# Patient Record
Sex: FEMALE | Race: WHITE | Employment: OTHER | URBAN - NONMETROPOLITAN AREA
[De-identification: names, ages, dates, MRNs, and addresses within clinical notes are randomized per-mention and may not be internally consistent; named-entity substitution may affect disease eponyms.]

---

## 2020-12-10 PROBLEM — I11.0 HYPERTENSIVE HEART DISEASE WITH CHRONIC SYSTOLIC CONGESTIVE HEART FAILURE (HCC): Status: ACTIVE | Noted: 2020-01-01

## 2020-12-10 PROBLEM — I50.22 HYPERTENSIVE HEART DISEASE WITH CHRONIC SYSTOLIC CONGESTIVE HEART FAILURE (HCC): Status: ACTIVE | Noted: 2020-01-01

## 2021-01-01 ENCOUNTER — APPOINTMENT (OUTPATIENT)
Dept: GENERAL RADIOLOGY | Age: 81
DRG: 870 | End: 2021-01-01
Payer: MEDICARE

## 2021-01-01 ENCOUNTER — APPOINTMENT (OUTPATIENT)
Dept: CT IMAGING | Age: 81
DRG: 870 | End: 2021-01-01
Payer: MEDICARE

## 2021-01-01 ENCOUNTER — HOSPITAL ENCOUNTER (INPATIENT)
Age: 81
LOS: 23 days | DRG: 870 | End: 2021-11-13
Attending: EMERGENCY MEDICINE
Payer: MEDICARE

## 2021-01-01 ENCOUNTER — APPOINTMENT (OUTPATIENT)
Dept: ULTRASOUND IMAGING | Age: 81
DRG: 870 | End: 2021-01-01
Payer: MEDICARE

## 2021-01-01 VITALS
RESPIRATION RATE: 25 BRPM | WEIGHT: 170.6 LBS | HEIGHT: 62 IN | DIASTOLIC BLOOD PRESSURE: 48 MMHG | BODY MASS INDEX: 31.39 KG/M2 | OXYGEN SATURATION: 90 % | HEART RATE: 74 BPM | SYSTOLIC BLOOD PRESSURE: 103 MMHG | TEMPERATURE: 99.3 F

## 2021-01-01 DIAGNOSIS — U07.1 COVID-19: Primary | ICD-10-CM

## 2021-01-01 DIAGNOSIS — E87.1 HYPONATREMIA: ICD-10-CM

## 2021-01-01 DIAGNOSIS — R53.1 GENERALIZED WEAKNESS: ICD-10-CM

## 2021-01-01 DIAGNOSIS — J96.01 ACUTE RESPIRATORY FAILURE WITH HYPOXIA (HCC): ICD-10-CM

## 2021-01-01 LAB
ABO: NORMAL
ACINETOBACTER CALCOACETICUS-BAUMANNII BY PCR: NOT DETECTED
ACINETOBACTER CALCOACETICUS-BAUMANNII BY PCR: NOT DETECTED
ACT TEG W HEP: 121 SECONDS (ref 86–118)
ACT TEG: 113 SECONDS (ref 86–118)
ADENOVIRUS BY PCR: NOT DETECTED
ADENOVIRUS BY PCR: NOT DETECTED
ALBUMIN SERPL-MCNC: 1.9 G/DL (ref 3.5–5.1)
ALBUMIN SERPL-MCNC: 2.4 G/DL (ref 3.5–5.1)
ALBUMIN SERPL-MCNC: 2.6 G/DL (ref 3.5–5.1)
ALBUMIN SERPL-MCNC: 3 G/DL (ref 3.5–5.1)
ALBUMIN SERPL-MCNC: 3.1 G/DL (ref 3.5–5.1)
ALBUMIN SERPL-MCNC: 3.5 G/DL (ref 3.5–5.1)
ALLEN TEST: ABNORMAL
ALLEN TEST: ABNORMAL
ALLEN TEST: POSITIVE
ALP BLD-CCNC: 116 U/L (ref 38–126)
ALP BLD-CCNC: 66 U/L (ref 38–126)
ALP BLD-CCNC: 71 U/L (ref 38–126)
ALP BLD-CCNC: 82 U/L (ref 38–126)
ALP BLD-CCNC: 93 U/L (ref 38–126)
ALT SERPL-CCNC: 12 U/L (ref 11–66)
ALT SERPL-CCNC: 22 U/L (ref 11–66)
ALT SERPL-CCNC: 31 U/L (ref 11–66)
ALT SERPL-CCNC: 66 U/L (ref 11–66)
ALT SERPL-CCNC: 9 U/L (ref 11–66)
ANGLE, RAPID TEG W HEP: 75.9 DEG (ref 64–80)
ANGLE, RAPID TEG: 70 DEG (ref 64–80)
ANION GAP SERPL CALCULATED.3IONS-SCNC: 10 MEQ/L (ref 8–16)
ANION GAP SERPL CALCULATED.3IONS-SCNC: 10 MEQ/L (ref 8–16)
ANION GAP SERPL CALCULATED.3IONS-SCNC: 11 MEQ/L (ref 8–16)
ANION GAP SERPL CALCULATED.3IONS-SCNC: 12 MEQ/L (ref 8–16)
ANION GAP SERPL CALCULATED.3IONS-SCNC: 13 MEQ/L (ref 8–16)
ANION GAP SERPL CALCULATED.3IONS-SCNC: 14 MEQ/L (ref 8–16)
ANION GAP SERPL CALCULATED.3IONS-SCNC: 15 MEQ/L (ref 8–16)
ANION GAP SERPL CALCULATED.3IONS-SCNC: 16 MEQ/L (ref 8–16)
ANION GAP SERPL CALCULATED.3IONS-SCNC: 17 MEQ/L (ref 8–16)
ANION GAP SERPL CALCULATED.3IONS-SCNC: 18 MEQ/L (ref 8–16)
ANION GAP SERPL CALCULATED.3IONS-SCNC: 18 MEQ/L (ref 8–16)
ANION GAP SERPL CALCULATED.3IONS-SCNC: 20 MEQ/L (ref 8–16)
ANION GAP SERPL CALCULATED.3IONS-SCNC: 9 MEQ/L (ref 8–16)
ANION GAP SERPL CALCULATED.3IONS-SCNC: 9 MEQ/L (ref 8–16)
ANISOCYTOSIS: PRESENT
ANTIBODY SCREEN: NORMAL
APTT: 33.9 SECONDS (ref 22–38)
AST SERPL-CCNC: 23 U/L (ref 5–40)
AST SERPL-CCNC: 36 U/L (ref 5–40)
AST SERPL-CCNC: 37 U/L (ref 5–40)
AST SERPL-CCNC: 40 U/L (ref 5–40)
AST SERPL-CCNC: 40 U/L (ref 5–40)
ATYPICAL LYMPHOCYTES: ABNORMAL %
BACTERIA: ABNORMAL /HPF
BACTERIA: ABNORMAL /HPF
BASE EXCESS (CALCULATED): -0.5 MMOL/L (ref -2.5–2.5)
BASE EXCESS (CALCULATED): -1.5 MMOL/L (ref -2.5–2.5)
BASE EXCESS (CALCULATED): -2.2 MMOL/L (ref -2.5–2.5)
BASE EXCESS (CALCULATED): -2.8 MMOL/L (ref -2.5–2.5)
BASE EXCESS (CALCULATED): -3.5 MMOL/L (ref -2.5–2.5)
BASE EXCESS (CALCULATED): -4.1 MMOL/L (ref -2.5–2.5)
BASE EXCESS (CALCULATED): -4.9 MMOL/L (ref -2.5–2.5)
BASE EXCESS (CALCULATED): -5.2 MMOL/L (ref -2.5–2.5)
BASE EXCESS (CALCULATED): 1.2 MMOL/L (ref -2.5–2.5)
BASE EXCESS (CALCULATED): 1.6 MMOL/L (ref -2.5–2.5)
BASE EXCESS (CALCULATED): 3.8 MMOL/L (ref -2.5–2.5)
BASOPHILIA: ABNORMAL
BASOPHILIA: ABNORMAL
BASOPHILIC STIPPLING: ABNORMAL
BASOPHILS # BLD: 0 %
BASOPHILS # BLD: 0.1 %
BASOPHILS # BLD: 0.2 %
BASOPHILS # BLD: 0.3 %
BASOPHILS # BLD: 0.4 %
BASOPHILS # BLD: 0.5 %
BASOPHILS # BLD: 0.6 %
BASOPHILS # BLD: 0.6 %
BASOPHILS ABSOLUTE: 0 THOU/MM3 (ref 0–0.1)
BASOPHILS ABSOLUTE: 0.1 THOU/MM3 (ref 0–0.1)
BASOPHILS ABSOLUTE: 0.2 THOU/MM3 (ref 0–0.1)
BILIRUB SERPL-MCNC: 0.2 MG/DL (ref 0.3–1.2)
BILIRUB SERPL-MCNC: 0.3 MG/DL (ref 0.3–1.2)
BILIRUB SERPL-MCNC: 0.3 MG/DL (ref 0.3–1.2)
BILIRUB SERPL-MCNC: 0.5 MG/DL (ref 0.3–1.2)
BILIRUB SERPL-MCNC: 0.9 MG/DL (ref 0.3–1.2)
BILIRUBIN DIRECT: 0.7 MG/DL (ref 0–0.3)
BILIRUBIN DIRECT: < 0.2 MG/DL (ref 0–0.3)
BILIRUBIN DIRECT: < 0.2 MG/DL (ref 0–0.3)
BILIRUBIN URINE: NEGATIVE
BILIRUBIN URINE: NEGATIVE
BLOOD CULTURE, ROUTINE: NORMAL
BLOOD, URINE: ABNORMAL
BLOOD, URINE: NEGATIVE
BUN BLDV-MCNC: 102 MG/DL (ref 7–22)
BUN BLDV-MCNC: 105 MG/DL (ref 7–22)
BUN BLDV-MCNC: 105 MG/DL (ref 7–22)
BUN BLDV-MCNC: 106 MG/DL (ref 7–22)
BUN BLDV-MCNC: 123 MG/DL (ref 7–22)
BUN BLDV-MCNC: 18 MG/DL (ref 7–22)
BUN BLDV-MCNC: 20 MG/DL (ref 7–22)
BUN BLDV-MCNC: 28 MG/DL (ref 7–22)
BUN BLDV-MCNC: 32 MG/DL (ref 7–22)
BUN BLDV-MCNC: 34 MG/DL (ref 7–22)
BUN BLDV-MCNC: 38 MG/DL (ref 7–22)
BUN BLDV-MCNC: 42 MG/DL (ref 7–22)
BUN BLDV-MCNC: 44 MG/DL (ref 7–22)
BUN BLDV-MCNC: 48 MG/DL (ref 7–22)
BUN BLDV-MCNC: 64 MG/DL (ref 7–22)
BUN BLDV-MCNC: 64 MG/DL (ref 7–22)
BUN BLDV-MCNC: 65 MG/DL (ref 7–22)
BUN BLDV-MCNC: 66 MG/DL (ref 7–22)
BUN BLDV-MCNC: 68 MG/DL (ref 7–22)
BUN BLDV-MCNC: 69 MG/DL (ref 7–22)
BUN BLDV-MCNC: 72 MG/DL (ref 7–22)
BUN BLDV-MCNC: 90 MG/DL (ref 7–22)
BUN BLDV-MCNC: 92 MG/DL (ref 7–22)
BUN BLDV-MCNC: 99 MG/DL (ref 7–22)
C-REACTIVE PROTEIN: 1.61 MG/DL (ref 0–1)
C-REACTIVE PROTEIN: 1.76 MG/DL (ref 0–1)
C-REACTIVE PROTEIN: 1.97 MG/DL (ref 0–1)
C-REACTIVE PROTEIN: 2.88 MG/DL (ref 0–1)
C-REACTIVE PROTEIN: 8.39 MG/DL (ref 0–1)
CALCIUM IONIZED: 1.33 MMOL/L (ref 1.12–1.32)
CALCIUM SERPL-MCNC: 10 MG/DL (ref 8.5–10.5)
CALCIUM SERPL-MCNC: 8.5 MG/DL (ref 8.5–10.5)
CALCIUM SERPL-MCNC: 8.6 MG/DL (ref 8.5–10.5)
CALCIUM SERPL-MCNC: 8.7 MG/DL (ref 8.5–10.5)
CALCIUM SERPL-MCNC: 8.7 MG/DL (ref 8.5–10.5)
CALCIUM SERPL-MCNC: 8.8 MG/DL (ref 8.5–10.5)
CALCIUM SERPL-MCNC: 8.9 MG/DL (ref 8.5–10.5)
CALCIUM SERPL-MCNC: 8.9 MG/DL (ref 8.5–10.5)
CALCIUM SERPL-MCNC: 9 MG/DL (ref 8.5–10.5)
CALCIUM SERPL-MCNC: 9.1 MG/DL (ref 8.5–10.5)
CALCIUM SERPL-MCNC: 9.2 MG/DL (ref 8.5–10.5)
CALCIUM SERPL-MCNC: 9.4 MG/DL (ref 8.5–10.5)
CALCIUM SERPL-MCNC: 9.4 MG/DL (ref 8.5–10.5)
CALCIUM SERPL-MCNC: 9.5 MG/DL (ref 8.5–10.5)
CALCIUM SERPL-MCNC: 9.5 MG/DL (ref 8.5–10.5)
CALCIUM SERPL-MCNC: 9.6 MG/DL (ref 8.5–10.5)
CALCIUM SERPL-MCNC: 9.6 MG/DL (ref 8.5–10.5)
CALCIUM SERPL-MCNC: 9.7 MG/DL (ref 8.5–10.5)
CASTS 2: ABNORMAL /LPF
CASTS 2: ABNORMAL /LPF
CASTS UA: ABNORMAL /LPF
CASTS UA: ABNORMAL /LPF
CHARACTER, URINE: ABNORMAL
CHARACTER, URINE: CLEAR
CHLAMYDIA PNEUMONIAE BY PCR: NOT DETECTED
CHLAMYDIA PNEUMONIAE BY PCR: NOT DETECTED
CHLORIDE BLD-SCNC: 100 MEQ/L (ref 98–111)
CHLORIDE BLD-SCNC: 103 MEQ/L (ref 98–111)
CHLORIDE BLD-SCNC: 104 MEQ/L (ref 98–111)
CHLORIDE BLD-SCNC: 104 MEQ/L (ref 98–111)
CHLORIDE BLD-SCNC: 105 MEQ/L (ref 98–111)
CHLORIDE BLD-SCNC: 105 MEQ/L (ref 98–111)
CHLORIDE BLD-SCNC: 106 MEQ/L (ref 98–111)
CHLORIDE BLD-SCNC: 108 MEQ/L (ref 98–111)
CHLORIDE BLD-SCNC: 110 MEQ/L (ref 98–111)
CHLORIDE BLD-SCNC: 111 MEQ/L (ref 98–111)
CHLORIDE BLD-SCNC: 112 MEQ/L (ref 98–111)
CHLORIDE BLD-SCNC: 112 MEQ/L (ref 98–111)
CHLORIDE BLD-SCNC: 114 MEQ/L (ref 98–111)
CHLORIDE BLD-SCNC: 114 MEQ/L (ref 98–111)
CHLORIDE BLD-SCNC: 116 MEQ/L (ref 98–111)
CHLORIDE BLD-SCNC: 92 MEQ/L (ref 98–111)
CHLORIDE BLD-SCNC: 97 MEQ/L (ref 98–111)
CHLORIDE BLD-SCNC: 97 MEQ/L (ref 98–111)
CHLORIDE BLD-SCNC: 98 MEQ/L (ref 98–111)
CHLORIDE BLD-SCNC: 98 MEQ/L (ref 98–111)
CHLORIDE BLD-SCNC: 99 MEQ/L (ref 98–111)
CO2: 16 MEQ/L (ref 23–33)
CO2: 17 MEQ/L (ref 23–33)
CO2: 20 MEQ/L (ref 23–33)
CO2: 21 MEQ/L (ref 23–33)
CO2: 22 MEQ/L (ref 23–33)
CO2: 22 MEQ/L (ref 23–33)
CO2: 23 MEQ/L (ref 23–33)
CO2: 24 MEQ/L (ref 23–33)
CO2: 25 MEQ/L (ref 23–33)
CO2: 26 MEQ/L (ref 23–33)
COLLECTED BY:: ABNORMAL
COLOR: YELLOW
COLOR: YELLOW
CORTISOL COLLECTION INFO: NORMAL
CORTISOL COLLECTION INFO: NORMAL
CORTISOL: 14.34 UG/DL
CORTISOL: 32.59 UG/DL
CREAT SERPL-MCNC: 0.8 MG/DL (ref 0.4–1.2)
CREAT SERPL-MCNC: 0.9 MG/DL (ref 0.4–1.2)
CREAT SERPL-MCNC: 1 MG/DL (ref 0.4–1.2)
CREAT SERPL-MCNC: 1.4 MG/DL (ref 0.4–1.2)
CREAT SERPL-MCNC: 1.5 MG/DL (ref 0.4–1.2)
CREAT SERPL-MCNC: 1.5 MG/DL (ref 0.4–1.2)
CREAT SERPL-MCNC: 1.6 MG/DL (ref 0.4–1.2)
CREAT SERPL-MCNC: 1.6 MG/DL (ref 0.4–1.2)
CREAT SERPL-MCNC: 1.9 MG/DL (ref 0.4–1.2)
CREAT SERPL-MCNC: 2 MG/DL (ref 0.4–1.2)
CREAT SERPL-MCNC: 2.1 MG/DL (ref 0.4–1.2)
CREAT SERPL-MCNC: 2.1 MG/DL (ref 0.4–1.2)
CREAT SERPL-MCNC: 2.2 MG/DL (ref 0.4–1.2)
CREAT SERPL-MCNC: 2.2 MG/DL (ref 0.4–1.2)
CREAT SERPL-MCNC: 2.7 MG/DL (ref 0.4–1.2)
CREAT SERPL-MCNC: 2.7 MG/DL (ref 0.4–1.2)
CREAT SERPL-MCNC: 2.8 MG/DL (ref 0.4–1.2)
CREAT SERPL-MCNC: 2.9 MG/DL (ref 0.4–1.2)
CREATININE URINE: 39 MG/DL
CREATININE URINE: 86.8 MG/DL
CRYSTALS, UA: ABNORMAL
CRYSTALS, UA: ABNORMAL
D-DIMER QUANTITATIVE: 1017 NG/ML FEU (ref 0–500)
D-DIMER QUANTITATIVE: 1360 NG/ML FEU (ref 0–500)
D-DIMER QUANTITATIVE: 830 NG/ML FEU (ref 0–500)
D-DIMER QUANTITATIVE: 947 NG/ML FEU (ref 0–500)
DEVICE: ABNORMAL
EKG ATRIAL RATE: 109 BPM
EKG ATRIAL RATE: 114 BPM
EKG ATRIAL RATE: 81 BPM
EKG ATRIAL RATE: 91 BPM
EKG P AXIS: 12 DEGREES
EKG P AXIS: 19 DEGREES
EKG P-R INTERVAL: 128 MS
EKG P-R INTERVAL: 136 MS
EKG P-R INTERVAL: 152 MS
EKG P-R INTERVAL: 96 MS
EKG Q-T INTERVAL: 320 MS
EKG Q-T INTERVAL: 330 MS
EKG Q-T INTERVAL: 368 MS
EKG Q-T INTERVAL: 380 MS
EKG QRS DURATION: 60 MS
EKG QRS DURATION: 62 MS
EKG QRS DURATION: 62 MS
EKG QRS DURATION: 74 MS
EKG QTC CALCULATION (BAZETT): 430 MS
EKG QTC CALCULATION (BAZETT): 441 MS
EKG QTC CALCULATION (BAZETT): 452 MS
EKG QTC CALCULATION (BAZETT): 454 MS
EKG R AXIS: -13 DEGREES
EKG R AXIS: -17 DEGREES
EKG R AXIS: -19 DEGREES
EKG R AXIS: -22 DEGREES
EKG T AXIS: 11 DEGREES
EKG T AXIS: 30 DEGREES
EKG T AXIS: 40 DEGREES
EKG T AXIS: 99 DEGREES
EKG VENTRICULAR RATE: 109 BPM
EKG VENTRICULAR RATE: 114 BPM
EKG VENTRICULAR RATE: 81 BPM
EKG VENTRICULAR RATE: 91 BPM
ENTEROBACTER CLOACAE COMPLEX BY PCR: NOT DETECTED
ENTEROBACTER CLOACAE COMPLEX BY PCR: NOT DETECTED
EOSINOPHIL # BLD: 0 %
EOSINOPHIL # BLD: 0.1 %
EOSINOPHIL # BLD: 0.2 %
EOSINOPHIL # BLD: 0.5 %
EOSINOPHIL # BLD: 0.8 %
EOSINOPHIL # BLD: 1.4 %
EOSINOPHIL # BLD: 1.9 %
EOSINOPHIL # BLD: 2.6 %
EOSINOPHILS ABSOLUTE: 0 THOU/MM3 (ref 0–0.4)
EOSINOPHILS ABSOLUTE: 0.1 THOU/MM3 (ref 0–0.4)
EOSINOPHILS ABSOLUTE: 0.1 THOU/MM3 (ref 0–0.4)
EOSINOPHILS ABSOLUTE: 0.2 THOU/MM3 (ref 0–0.4)
EPITHELIAL CELLS, UA: ABNORMAL /HPF
EPITHELIAL CELLS, UA: ABNORMAL /HPF
EPL TEG, W/HEP: 0 % (ref 0–15)
EPL-TEG: 0 % (ref 0–15)
ERYTHROCYTE [DISTWIDTH] IN BLOOD BY AUTOMATED COUNT: 11.7 % (ref 11.5–14.5)
ERYTHROCYTE [DISTWIDTH] IN BLOOD BY AUTOMATED COUNT: 11.7 % (ref 11.5–14.5)
ERYTHROCYTE [DISTWIDTH] IN BLOOD BY AUTOMATED COUNT: 11.9 % (ref 11.5–14.5)
ERYTHROCYTE [DISTWIDTH] IN BLOOD BY AUTOMATED COUNT: 11.9 % (ref 11.5–14.5)
ERYTHROCYTE [DISTWIDTH] IN BLOOD BY AUTOMATED COUNT: 12 % (ref 11.5–14.5)
ERYTHROCYTE [DISTWIDTH] IN BLOOD BY AUTOMATED COUNT: 12 % (ref 11.5–14.5)
ERYTHROCYTE [DISTWIDTH] IN BLOOD BY AUTOMATED COUNT: 12.1 % (ref 11.5–14.5)
ERYTHROCYTE [DISTWIDTH] IN BLOOD BY AUTOMATED COUNT: 12.4 % (ref 11.5–14.5)
ERYTHROCYTE [DISTWIDTH] IN BLOOD BY AUTOMATED COUNT: 12.5 % (ref 11.5–14.5)
ERYTHROCYTE [DISTWIDTH] IN BLOOD BY AUTOMATED COUNT: 12.5 % (ref 11.5–14.5)
ERYTHROCYTE [DISTWIDTH] IN BLOOD BY AUTOMATED COUNT: 12.6 % (ref 11.5–14.5)
ERYTHROCYTE [DISTWIDTH] IN BLOOD BY AUTOMATED COUNT: 13.1 % (ref 11.5–14.5)
ERYTHROCYTE [DISTWIDTH] IN BLOOD BY AUTOMATED COUNT: 13.9 % (ref 11.5–14.5)
ERYTHROCYTE [DISTWIDTH] IN BLOOD BY AUTOMATED COUNT: 18.5 % (ref 11.5–14.5)
ERYTHROCYTE [DISTWIDTH] IN BLOOD BY AUTOMATED COUNT: 18.9 % (ref 11.5–14.5)
ERYTHROCYTE [DISTWIDTH] IN BLOOD BY AUTOMATED COUNT: 18.9 % (ref 11.5–14.5)
ERYTHROCYTE [DISTWIDTH] IN BLOOD BY AUTOMATED COUNT: 19.3 % (ref 11.5–14.5)
ERYTHROCYTE [DISTWIDTH] IN BLOOD BY AUTOMATED COUNT: 19.5 % (ref 11.5–14.5)
ERYTHROCYTE [DISTWIDTH] IN BLOOD BY AUTOMATED COUNT: 42.2 FL (ref 35–45)
ERYTHROCYTE [DISTWIDTH] IN BLOOD BY AUTOMATED COUNT: 43.3 FL (ref 35–45)
ERYTHROCYTE [DISTWIDTH] IN BLOOD BY AUTOMATED COUNT: 43.4 FL (ref 35–45)
ERYTHROCYTE [DISTWIDTH] IN BLOOD BY AUTOMATED COUNT: 44.4 FL (ref 35–45)
ERYTHROCYTE [DISTWIDTH] IN BLOOD BY AUTOMATED COUNT: 44.5 FL (ref 35–45)
ERYTHROCYTE [DISTWIDTH] IN BLOOD BY AUTOMATED COUNT: 44.8 FL (ref 35–45)
ERYTHROCYTE [DISTWIDTH] IN BLOOD BY AUTOMATED COUNT: 45.1 FL (ref 35–45)
ERYTHROCYTE [DISTWIDTH] IN BLOOD BY AUTOMATED COUNT: 45.3 FL (ref 35–45)
ERYTHROCYTE [DISTWIDTH] IN BLOOD BY AUTOMATED COUNT: 45.6 FL (ref 35–45)
ERYTHROCYTE [DISTWIDTH] IN BLOOD BY AUTOMATED COUNT: 46.7 FL (ref 35–45)
ERYTHROCYTE [DISTWIDTH] IN BLOOD BY AUTOMATED COUNT: 47.2 FL (ref 35–45)
ERYTHROCYTE [DISTWIDTH] IN BLOOD BY AUTOMATED COUNT: 47.5 FL (ref 35–45)
ERYTHROCYTE [DISTWIDTH] IN BLOOD BY AUTOMATED COUNT: 50.4 FL (ref 35–45)
ERYTHROCYTE [DISTWIDTH] IN BLOOD BY AUTOMATED COUNT: 52.1 FL (ref 35–45)
ERYTHROCYTE [DISTWIDTH] IN BLOOD BY AUTOMATED COUNT: 52.3 FL (ref 35–45)
ERYTHROCYTE [DISTWIDTH] IN BLOOD BY AUTOMATED COUNT: 61.9 FL (ref 35–45)
ERYTHROCYTE [DISTWIDTH] IN BLOOD BY AUTOMATED COUNT: 63.9 FL (ref 35–45)
ERYTHROCYTE [DISTWIDTH] IN BLOOD BY AUTOMATED COUNT: 64.3 FL (ref 35–45)
ERYTHROCYTE [DISTWIDTH] IN BLOOD BY AUTOMATED COUNT: 64.9 FL (ref 35–45)
ERYTHROCYTE [DISTWIDTH] IN BLOOD BY AUTOMATED COUNT: 65.8 FL (ref 35–45)
ESCHERICHIA COLI BY PCR: NOT DETECTED
ESCHERICHIA COLI BY PCR: NOT DETECTED
FERRITIN: 1119 NG/ML (ref 10–291)
FERRITIN: 1271 NG/ML (ref 10–291)
FERRITIN: 1539 NG/ML (ref 10–291)
FERRITIN: 1578 NG/ML (ref 10–291)
FERRITIN: 1649 NG/ML (ref 10–291)
FERRITIN: 4267 NG/ML (ref 10–291)
FERRITIN: 981 NG/ML (ref 10–291)
FIBRIN SPLIT PRODUCTS: ABNORMAL MCG/ML
FIBRINOGEN: 456 MG/100ML (ref 155–475)
FIBRINOGEN: 688 MG/100ML (ref 155–475)
FOLATE: 4.5 NG/ML (ref 4.8–24.2)
GFR SERPL CREATININE-BSD FRML MDRD: 16 ML/MIN/1.73M2
GFR SERPL CREATININE-BSD FRML MDRD: 16 ML/MIN/1.73M2
GFR SERPL CREATININE-BSD FRML MDRD: 17 ML/MIN/1.73M2
GFR SERPL CREATININE-BSD FRML MDRD: 17 ML/MIN/1.73M2
GFR SERPL CREATININE-BSD FRML MDRD: 21 ML/MIN/1.73M2
GFR SERPL CREATININE-BSD FRML MDRD: 21 ML/MIN/1.73M2
GFR SERPL CREATININE-BSD FRML MDRD: 23 ML/MIN/1.73M2
GFR SERPL CREATININE-BSD FRML MDRD: 23 ML/MIN/1.73M2
GFR SERPL CREATININE-BSD FRML MDRD: 24 ML/MIN/1.73M2
GFR SERPL CREATININE-BSD FRML MDRD: 25 ML/MIN/1.73M2
GFR SERPL CREATININE-BSD FRML MDRD: 31 ML/MIN/1.73M2
GFR SERPL CREATININE-BSD FRML MDRD: 31 ML/MIN/1.73M2
GFR SERPL CREATININE-BSD FRML MDRD: 33 ML/MIN/1.73M2
GFR SERPL CREATININE-BSD FRML MDRD: 33 ML/MIN/1.73M2
GFR SERPL CREATININE-BSD FRML MDRD: 36 ML/MIN/1.73M2
GFR SERPL CREATININE-BSD FRML MDRD: 53 ML/MIN/1.73M2
GFR SERPL CREATININE-BSD FRML MDRD: 60 ML/MIN/1.73M2
GFR SERPL CREATININE-BSD FRML MDRD: 69 ML/MIN/1.73M2
GLUCOSE BLD-MCNC: 102 MG/DL (ref 70–108)
GLUCOSE BLD-MCNC: 105 MG/DL (ref 70–108)
GLUCOSE BLD-MCNC: 108 MG/DL (ref 70–108)
GLUCOSE BLD-MCNC: 112 MG/DL (ref 70–108)
GLUCOSE BLD-MCNC: 115 MG/DL (ref 70–108)
GLUCOSE BLD-MCNC: 116 MG/DL (ref 70–108)
GLUCOSE BLD-MCNC: 120 MG/DL (ref 70–108)
GLUCOSE BLD-MCNC: 121 MG/DL (ref 70–108)
GLUCOSE BLD-MCNC: 124 MG/DL (ref 70–108)
GLUCOSE BLD-MCNC: 127 MG/DL (ref 70–108)
GLUCOSE BLD-MCNC: 129 MG/DL (ref 70–108)
GLUCOSE BLD-MCNC: 139 MG/DL (ref 70–108)
GLUCOSE BLD-MCNC: 144 MG/DL (ref 70–108)
GLUCOSE BLD-MCNC: 146 MG/DL (ref 70–108)
GLUCOSE BLD-MCNC: 147 MG/DL (ref 70–108)
GLUCOSE BLD-MCNC: 157 MG/DL (ref 70–108)
GLUCOSE BLD-MCNC: 160 MG/DL (ref 70–108)
GLUCOSE BLD-MCNC: 162 MG/DL (ref 70–108)
GLUCOSE BLD-MCNC: 164 MG/DL (ref 70–108)
GLUCOSE BLD-MCNC: 166 MG/DL (ref 70–108)
GLUCOSE BLD-MCNC: 167 MG/DL (ref 70–108)
GLUCOSE BLD-MCNC: 170 MG/DL (ref 70–108)
GLUCOSE BLD-MCNC: 170 MG/DL (ref 70–108)
GLUCOSE BLD-MCNC: 172 MG/DL (ref 70–108)
GLUCOSE BLD-MCNC: 174 MG/DL (ref 70–108)
GLUCOSE BLD-MCNC: 176 MG/DL (ref 70–108)
GLUCOSE BLD-MCNC: 176 MG/DL (ref 70–108)
GLUCOSE BLD-MCNC: 177 MG/DL (ref 70–108)
GLUCOSE BLD-MCNC: 179 MG/DL (ref 70–108)
GLUCOSE BLD-MCNC: 180 MG/DL (ref 70–108)
GLUCOSE BLD-MCNC: 182 MG/DL (ref 70–108)
GLUCOSE BLD-MCNC: 186 MG/DL (ref 70–108)
GLUCOSE BLD-MCNC: 188 MG/DL (ref 70–108)
GLUCOSE BLD-MCNC: 191 MG/DL (ref 70–108)
GLUCOSE BLD-MCNC: 191 MG/DL (ref 70–108)
GLUCOSE BLD-MCNC: 194 MG/DL (ref 70–108)
GLUCOSE BLD-MCNC: 195 MG/DL (ref 70–108)
GLUCOSE BLD-MCNC: 197 MG/DL (ref 70–108)
GLUCOSE BLD-MCNC: 197 MG/DL (ref 70–108)
GLUCOSE BLD-MCNC: 198 MG/DL (ref 70–108)
GLUCOSE BLD-MCNC: 198 MG/DL (ref 70–108)
GLUCOSE BLD-MCNC: 199 MG/DL (ref 70–108)
GLUCOSE BLD-MCNC: 201 MG/DL (ref 70–108)
GLUCOSE BLD-MCNC: 202 MG/DL (ref 70–108)
GLUCOSE BLD-MCNC: 205 MG/DL (ref 70–108)
GLUCOSE BLD-MCNC: 206 MG/DL (ref 70–108)
GLUCOSE BLD-MCNC: 207 MG/DL (ref 70–108)
GLUCOSE BLD-MCNC: 208 MG/DL (ref 70–108)
GLUCOSE BLD-MCNC: 210 MG/DL (ref 70–108)
GLUCOSE BLD-MCNC: 211 MG/DL (ref 70–108)
GLUCOSE BLD-MCNC: 212 MG/DL (ref 70–108)
GLUCOSE BLD-MCNC: 212 MG/DL (ref 70–108)
GLUCOSE BLD-MCNC: 213 MG/DL (ref 70–108)
GLUCOSE BLD-MCNC: 214 MG/DL (ref 70–108)
GLUCOSE BLD-MCNC: 215 MG/DL (ref 70–108)
GLUCOSE BLD-MCNC: 221 MG/DL (ref 70–108)
GLUCOSE BLD-MCNC: 222 MG/DL (ref 70–108)
GLUCOSE BLD-MCNC: 223 MG/DL (ref 70–108)
GLUCOSE BLD-MCNC: 224 MG/DL (ref 70–108)
GLUCOSE BLD-MCNC: 226 MG/DL (ref 70–108)
GLUCOSE BLD-MCNC: 229 MG/DL (ref 70–108)
GLUCOSE BLD-MCNC: 229 MG/DL (ref 70–108)
GLUCOSE BLD-MCNC: 230 MG/DL (ref 70–108)
GLUCOSE BLD-MCNC: 231 MG/DL (ref 70–108)
GLUCOSE BLD-MCNC: 231 MG/DL (ref 70–108)
GLUCOSE BLD-MCNC: 232 MG/DL (ref 70–108)
GLUCOSE BLD-MCNC: 235 MG/DL (ref 70–108)
GLUCOSE BLD-MCNC: 236 MG/DL (ref 70–108)
GLUCOSE BLD-MCNC: 237 MG/DL (ref 70–108)
GLUCOSE BLD-MCNC: 242 MG/DL (ref 70–108)
GLUCOSE BLD-MCNC: 243 MG/DL (ref 70–108)
GLUCOSE BLD-MCNC: 243 MG/DL (ref 70–108)
GLUCOSE BLD-MCNC: 245 MG/DL (ref 70–108)
GLUCOSE BLD-MCNC: 246 MG/DL (ref 70–108)
GLUCOSE BLD-MCNC: 247 MG/DL (ref 70–108)
GLUCOSE BLD-MCNC: 250 MG/DL (ref 70–108)
GLUCOSE BLD-MCNC: 250 MG/DL (ref 70–108)
GLUCOSE BLD-MCNC: 252 MG/DL (ref 70–108)
GLUCOSE BLD-MCNC: 253 MG/DL (ref 70–108)
GLUCOSE BLD-MCNC: 255 MG/DL (ref 70–108)
GLUCOSE BLD-MCNC: 256 MG/DL (ref 70–108)
GLUCOSE BLD-MCNC: 257 MG/DL (ref 70–108)
GLUCOSE BLD-MCNC: 258 MG/DL (ref 70–108)
GLUCOSE BLD-MCNC: 258 MG/DL (ref 70–108)
GLUCOSE BLD-MCNC: 260 MG/DL (ref 70–108)
GLUCOSE BLD-MCNC: 264 MG/DL (ref 70–108)
GLUCOSE BLD-MCNC: 269 MG/DL (ref 70–108)
GLUCOSE BLD-MCNC: 270 MG/DL (ref 70–108)
GLUCOSE BLD-MCNC: 270 MG/DL (ref 70–108)
GLUCOSE BLD-MCNC: 271 MG/DL (ref 70–108)
GLUCOSE BLD-MCNC: 272 MG/DL (ref 70–108)
GLUCOSE BLD-MCNC: 276 MG/DL (ref 70–108)
GLUCOSE BLD-MCNC: 278 MG/DL (ref 70–108)
GLUCOSE BLD-MCNC: 278 MG/DL (ref 70–108)
GLUCOSE BLD-MCNC: 281 MG/DL (ref 70–108)
GLUCOSE BLD-MCNC: 281 MG/DL (ref 70–108)
GLUCOSE BLD-MCNC: 282 MG/DL (ref 70–108)
GLUCOSE BLD-MCNC: 284 MG/DL (ref 70–108)
GLUCOSE BLD-MCNC: 285 MG/DL (ref 70–108)
GLUCOSE BLD-MCNC: 299 MG/DL (ref 70–108)
GLUCOSE BLD-MCNC: 300 MG/DL (ref 70–108)
GLUCOSE BLD-MCNC: 300 MG/DL (ref 70–108)
GLUCOSE BLD-MCNC: 304 MG/DL (ref 70–108)
GLUCOSE BLD-MCNC: 305 MG/DL (ref 70–108)
GLUCOSE BLD-MCNC: 308 MG/DL (ref 70–108)
GLUCOSE BLD-MCNC: 309 MG/DL (ref 70–108)
GLUCOSE BLD-MCNC: 316 MG/DL (ref 70–108)
GLUCOSE BLD-MCNC: 324 MG/DL (ref 70–108)
GLUCOSE BLD-MCNC: 327 MG/DL (ref 70–108)
GLUCOSE BLD-MCNC: 330 MG/DL (ref 70–108)
GLUCOSE BLD-MCNC: 358 MG/DL (ref 70–108)
GLUCOSE BLD-MCNC: 364 MG/DL (ref 70–108)
GLUCOSE BLD-MCNC: 83 MG/DL (ref 70–108)
GLUCOSE BLD-MCNC: 93 MG/DL (ref 70–108)
GLUCOSE BLD-MCNC: 95 MG/DL (ref 70–108)
GLUCOSE URINE: 250 MG/DL
GLUCOSE URINE: NEGATIVE MG/DL
GRAM STAIN RESULT: ABNORMAL
GRAM STAIN RESULT: NORMAL
HAEMOPHILUS INFLUENZAE BY PCR: NOT DETECTED
HAEMOPHILUS INFLUENZAE BY PCR: NOT DETECTED
HCO3: 16 MMOL/L (ref 23–28)
HCO3: 18 MMOL/L (ref 23–28)
HCO3: 23 MMOL/L (ref 23–28)
HCO3: 24 MMOL/L (ref 23–28)
HCO3: 24 MMOL/L (ref 23–28)
HCO3: 25 MMOL/L (ref 23–28)
HCO3: 26 MMOL/L (ref 23–28)
HCT VFR BLD CALC: 22.2 % (ref 37–47)
HCT VFR BLD CALC: 22.9 % (ref 37–47)
HCT VFR BLD CALC: 23.7 % (ref 37–47)
HCT VFR BLD CALC: 23.8 % (ref 37–47)
HCT VFR BLD CALC: 25.4 % (ref 37–47)
HCT VFR BLD CALC: 25.8 % (ref 37–47)
HCT VFR BLD CALC: 26 % (ref 37–47)
HCT VFR BLD CALC: 26.8 % (ref 37–47)
HCT VFR BLD CALC: 26.9 % (ref 37–47)
HCT VFR BLD CALC: 27.2 % (ref 37–47)
HCT VFR BLD CALC: 27.2 % (ref 37–47)
HCT VFR BLD CALC: 27.5 % (ref 37–47)
HCT VFR BLD CALC: 28.5 % (ref 37–47)
HCT VFR BLD CALC: 30.5 % (ref 37–47)
HCT VFR BLD CALC: 31 % (ref 37–47)
HCT VFR BLD CALC: 31.6 % (ref 37–47)
HCT VFR BLD CALC: 32 % (ref 37–47)
HCT VFR BLD CALC: 32.2 % (ref 37–47)
HCT VFR BLD CALC: 33.8 % (ref 37–47)
HCT VFR BLD CALC: 37.9 % (ref 37–47)
HCT VFR BLD CALC: 38.5 % (ref 37–47)
HCT VFR BLD CALC: 41.7 % (ref 37–47)
HCT VFR BLD CALC: 42.8 % (ref 37–47)
HCT VFR BLD CALC: 45.4 % (ref 37–47)
HCT VFR BLD CALC: 45.8 % (ref 37–47)
HCT VFR BLD CALC: 46.8 % (ref 37–47)
HCT VFR BLD CALC: 46.8 % (ref 37–47)
HCT VFR BLD CALC: 47 % (ref 37–47)
HCT VFR BLD CALC: 47.2 % (ref 37–47)
HCT VFR BLD CALC: 48 % (ref 37–47)
HEMOGLOBIN: 10 GM/DL (ref 12–16)
HEMOGLOBIN: 10.2 GM/DL (ref 12–16)
HEMOGLOBIN: 10.4 GM/DL (ref 12–16)
HEMOGLOBIN: 10.4 GM/DL (ref 12–16)
HEMOGLOBIN: 10.9 GM/DL (ref 12–16)
HEMOGLOBIN: 11.6 GM/DL (ref 12–16)
HEMOGLOBIN: 12.5 GM/DL (ref 12–16)
HEMOGLOBIN: 14 GM/DL (ref 12–16)
HEMOGLOBIN: 14.4 GM/DL (ref 12–16)
HEMOGLOBIN: 15.3 GM/DL (ref 12–16)
HEMOGLOBIN: 15.4 GM/DL (ref 12–16)
HEMOGLOBIN: 15.7 GM/DL (ref 12–16)
HEMOGLOBIN: 15.9 GM/DL (ref 12–16)
HEMOGLOBIN: 15.9 GM/DL (ref 12–16)
HEMOGLOBIN: 16.1 GM/DL (ref 12–16)
HEMOGLOBIN: 16.2 GM/DL (ref 12–16)
HEMOGLOBIN: 7.1 GM/DL (ref 12–16)
HEMOGLOBIN: 7.3 GM/DL (ref 12–16)
HEMOGLOBIN: 7.7 GM/DL (ref 12–16)
HEMOGLOBIN: 7.8 GM/DL (ref 12–16)
HEMOGLOBIN: 7.9 GM/DL (ref 12–16)
HEMOGLOBIN: 8.1 GM/DL (ref 12–16)
HEMOGLOBIN: 8.5 GM/DL (ref 12–16)
HEMOGLOBIN: 8.6 GM/DL (ref 12–16)
HEMOGLOBIN: 8.7 GM/DL (ref 12–16)
HEMOGLOBIN: 8.8 GM/DL (ref 12–16)
HEMOGLOBIN: 9.1 GM/DL (ref 12–16)
HEMOGLOBIN: 9.9 GM/DL (ref 12–16)
HEPARIN ASSOCIATED AB DETECTION: NORMAL
HEPARIN THERAPY: NO
HEPARIN THERAPY: YES
HYPOCHROMIA: PRESENT
IFIO2: 100
IFIO2: 50
IFIO2: 55
IFIO2: 60
IFIO2: 70
IFIO2: 75
IFIO2: 75
IMMATURE GRANS (ABS): 0.02 THOU/MM3 (ref 0–0.07)
IMMATURE GRANS (ABS): 0.03 THOU/MM3 (ref 0–0.07)
IMMATURE GRANS (ABS): 0.15 THOU/MM3 (ref 0–0.07)
IMMATURE GRANS (ABS): 0.19 THOU/MM3 (ref 0–0.07)
IMMATURE GRANS (ABS): 0.24 THOU/MM3 (ref 0–0.07)
IMMATURE GRANS (ABS): 0.25 THOU/MM3 (ref 0–0.07)
IMMATURE GRANS (ABS): 0.28 THOU/MM3 (ref 0–0.07)
IMMATURE GRANS (ABS): 0.52 THOU/MM3 (ref 0–0.07)
IMMATURE GRANS (ABS): 0.64 THOU/MM3 (ref 0–0.07)
IMMATURE GRANS (ABS): 0.68 THOU/MM3 (ref 0–0.07)
IMMATURE GRANS (ABS): 0.84 THOU/MM3 (ref 0–0.07)
IMMATURE GRANS (ABS): 0.95 THOU/MM3 (ref 0–0.07)
IMMATURE GRANS (ABS): 1.01 THOU/MM3 (ref 0–0.07)
IMMATURE GRANS (ABS): 1.24 THOU/MM3 (ref 0–0.07)
IMMATURE GRANS (ABS): 1.35 THOU/MM3 (ref 0–0.07)
IMMATURE GRANS (ABS): 1.49 THOU/MM3 (ref 0–0.07)
IMMATURE GRANS (ABS): 1.88 THOU/MM3 (ref 0–0.07)
IMMATURE GRANS (ABS): 3.39 THOU/MM3 (ref 0–0.07)
IMMATURE GRANS (ABS): 5.69 THOU/MM3 (ref 0–0.07)
IMMATURE GRANULOCYTES: 0.5 %
IMMATURE GRANULOCYTES: 0.7 %
IMMATURE GRANULOCYTES: 1.5 %
IMMATURE GRANULOCYTES: 1.6 %
IMMATURE GRANULOCYTES: 1.8 %
IMMATURE GRANULOCYTES: 1.9 %
IMMATURE GRANULOCYTES: 11.5 %
IMMATURE GRANULOCYTES: 2.1 %
IMMATURE GRANULOCYTES: 2.3 %
IMMATURE GRANULOCYTES: 2.9 %
IMMATURE GRANULOCYTES: 3.2 %
IMMATURE GRANULOCYTES: 3.4 %
IMMATURE GRANULOCYTES: 3.7 %
IMMATURE GRANULOCYTES: 4 %
IMMATURE GRANULOCYTES: 4.1 %
IMMATURE GRANULOCYTES: 4.2 %
IMMATURE GRANULOCYTES: 4.6 %
IMMATURE GRANULOCYTES: 4.6 %
IMMATURE GRANULOCYTES: 6.9 %
INFLUENZA A BY PCR: NOT DETECTED
INFLUENZA A BY PCR: NOT DETECTED
INFLUENZA B BY PCR: NOT DETECTED
INFLUENZA B BY PCR: NOT DETECTED
INR BLD: 0.9 (ref 0.85–1.13)
INR BLD: 0.93 (ref 0.85–1.13)
KETONES, URINE: ABNORMAL
KETONES, URINE: NEGATIVE
KINETICS RAPID TEG W HEP: 1.3 MINUTES (ref 0.5–2.3)
KINETICS RAPID TEG: 1.7 MINUTES (ref 0.5–2.3)
KLEBSIELLA AEROGENES BY PCR: NOT DETECTED
KLEBSIELLA AEROGENES BY PCR: NOT DETECTED
KLEBSIELLA OXYTOCA BY PCR: NOT DETECTED
KLEBSIELLA OXYTOCA BY PCR: NOT DETECTED
KLEBSIELLA PNEUMONIAE GROUP BY PCR: NOT DETECTED
KLEBSIELLA PNEUMONIAE GROUP BY PCR: NOT DETECTED
LACTIC ACID, SEPSIS: 1.3 MMOL/L (ref 0.5–1.9)
LACTIC ACID, SEPSIS: 1.5 MMOL/L (ref 0.5–1.9)
LACTIC ACID, SEPSIS: 2.3 MMOL/L (ref 0.5–1.9)
LACTIC ACID, SEPSIS: 5.4 MMOL/L (ref 0.5–1.9)
LACTIC ACID: 1.4 MMOL/L (ref 0.5–2)
LACTIC ACID: 1.7 MMOL/L (ref 0.5–2)
LACTIC ACID: 1.9 MMOL/L (ref 0.5–2)
LACTIC ACID: 2.8 MMOL/L (ref 0.5–2)
LACTIC ACID: 4.9 MMOL/L (ref 0.5–2)
LD: 346 U/L (ref 100–190)
LD: 550 U/L (ref 100–190)
LD: 648 U/L (ref 100–190)
LEGIONELLA PNEUMOPHILIA BY PCR: NOT DETECTED
LEGIONELLA PNEUMOPHILIA BY PCR: NOT DETECTED
LEUKOCYTE ESTERASE, URINE: ABNORMAL
LEUKOCYTE ESTERASE, URINE: NEGATIVE
LV EF: 68 %
LVEF MODALITY: NORMAL
LY30 (LYSIS) TEG: 0 % (ref 0–7.5)
LY30(LYSIS) TEG W HEPARIN: 0 % (ref 0–7.5)
LYMPHOCYTES # BLD: 1.3 %
LYMPHOCYTES # BLD: 1.3 %
LYMPHOCYTES # BLD: 1.5 %
LYMPHOCYTES # BLD: 1.7 %
LYMPHOCYTES # BLD: 1.7 %
LYMPHOCYTES # BLD: 1.8 %
LYMPHOCYTES # BLD: 2.1 %
LYMPHOCYTES # BLD: 2.1 %
LYMPHOCYTES # BLD: 2.3 %
LYMPHOCYTES # BLD: 2.5 %
LYMPHOCYTES # BLD: 2.6 %
LYMPHOCYTES # BLD: 2.7 %
LYMPHOCYTES # BLD: 23.4 %
LYMPHOCYTES # BLD: 3 %
LYMPHOCYTES # BLD: 3.3 %
LYMPHOCYTES # BLD: 4.5 %
LYMPHOCYTES # BLD: 6.5 %
LYMPHOCYTES ABSOLUTE: 0.2 THOU/MM3 (ref 1–4.8)
LYMPHOCYTES ABSOLUTE: 0.3 THOU/MM3 (ref 1–4.8)
LYMPHOCYTES ABSOLUTE: 0.4 THOU/MM3 (ref 1–4.8)
LYMPHOCYTES ABSOLUTE: 0.4 THOU/MM3 (ref 1–4.8)
LYMPHOCYTES ABSOLUTE: 0.5 THOU/MM3 (ref 1–4.8)
LYMPHOCYTES ABSOLUTE: 0.6 THOU/MM3 (ref 1–4.8)
LYMPHOCYTES ABSOLUTE: 0.7 THOU/MM3 (ref 1–4.8)
LYMPHOCYTES ABSOLUTE: 0.8 THOU/MM3 (ref 1–4.8)
LYMPHOCYTES ABSOLUTE: 0.8 THOU/MM3 (ref 1–4.8)
LYMPHOCYTES ABSOLUTE: 1.3 THOU/MM3 (ref 1–4.8)
MA(MAX CLOT) RAPID TEG W HEP: 53.1 MM (ref 52–71)
MA(MAX CLOT) RAPID TEG: 60 MM (ref 52–71)
MACROCYTES: PRESENT
MAGNESIUM: 1.7 MG/DL (ref 1.6–2.4)
MAGNESIUM: 2.1 MG/DL (ref 1.6–2.4)
MCH RBC QN AUTO: 30.4 PG (ref 26–33)
MCH RBC QN AUTO: 30.5 PG (ref 26–33)
MCH RBC QN AUTO: 30.7 PG (ref 26–33)
MCH RBC QN AUTO: 31.4 PG (ref 26–33)
MCH RBC QN AUTO: 31.4 PG (ref 26–33)
MCH RBC QN AUTO: 33.1 PG (ref 26–33)
MCH RBC QN AUTO: 33.2 PG (ref 26–33)
MCH RBC QN AUTO: 33.3 PG (ref 26–33)
MCH RBC QN AUTO: 33.3 PG (ref 26–33)
MCH RBC QN AUTO: 33.5 PG (ref 26–33)
MCH RBC QN AUTO: 33.7 PG (ref 26–33)
MCH RBC QN AUTO: 33.8 PG (ref 26–33)
MCH RBC QN AUTO: 34.2 PG (ref 26–33)
MCH RBC QN AUTO: 34.5 PG (ref 26–33)
MCH RBC QN AUTO: 34.7 PG (ref 26–33)
MCH RBC QN AUTO: 34.8 PG (ref 26–33)
MCH RBC QN AUTO: 37.3 PG (ref 26–33)
MCHC RBC AUTO-ENTMCNC: 30.6 GM/DL (ref 32.2–35.5)
MCHC RBC AUTO-ENTMCNC: 31.3 GM/DL (ref 32.2–35.5)
MCHC RBC AUTO-ENTMCNC: 31.3 GM/DL (ref 32.2–35.5)
MCHC RBC AUTO-ENTMCNC: 31.6 GM/DL (ref 32.2–35.5)
MCHC RBC AUTO-ENTMCNC: 31.9 GM/DL (ref 32.2–35.5)
MCHC RBC AUTO-ENTMCNC: 32.5 GM/DL (ref 32.2–35.5)
MCHC RBC AUTO-ENTMCNC: 32.7 GM/DL (ref 32.2–35.5)
MCHC RBC AUTO-ENTMCNC: 33.4 GM/DL (ref 32.2–35.5)
MCHC RBC AUTO-ENTMCNC: 33.4 GM/DL (ref 32.2–35.5)
MCHC RBC AUTO-ENTMCNC: 33.5 GM/DL (ref 32.2–35.5)
MCHC RBC AUTO-ENTMCNC: 33.7 GM/DL (ref 32.2–35.5)
MCHC RBC AUTO-ENTMCNC: 33.8 GM/DL (ref 32.2–35.5)
MCHC RBC AUTO-ENTMCNC: 33.9 GM/DL (ref 32.2–35.5)
MCHC RBC AUTO-ENTMCNC: 34.1 GM/DL (ref 32.2–35.5)
MCHC RBC AUTO-ENTMCNC: 34.5 GM/DL (ref 32.2–35.5)
MCHC RBC AUTO-ENTMCNC: 34.6 GM/DL (ref 32.2–35.5)
MCV RBC AUTO: 100.6 FL (ref 81–99)
MCV RBC AUTO: 101.7 FL (ref 81–99)
MCV RBC AUTO: 101.9 FL (ref 81–99)
MCV RBC AUTO: 103.2 FL (ref 81–99)
MCV RBC AUTO: 103.2 FL (ref 81–99)
MCV RBC AUTO: 103.7 FL (ref 81–99)
MCV RBC AUTO: 109.6 FL (ref 81–99)
MCV RBC AUTO: 110.1 FL (ref 81–99)
MCV RBC AUTO: 111.8 FL (ref 81–99)
MCV RBC AUTO: 96.3 FL (ref 81–99)
MCV RBC AUTO: 96.7 FL (ref 81–99)
MCV RBC AUTO: 96.7 FL (ref 81–99)
MCV RBC AUTO: 96.9 FL (ref 81–99)
MCV RBC AUTO: 97.5 FL (ref 81–99)
MCV RBC AUTO: 98.1 FL (ref 81–99)
MCV RBC AUTO: 98.1 FL (ref 81–99)
MCV RBC AUTO: 98.9 FL (ref 81–99)
MCV RBC AUTO: 99 FL (ref 81–99)
MCV RBC AUTO: 99.3 FL (ref 81–99)
MCV RBC AUTO: 99.6 FL (ref 81–99)
METAPNEUMOVIRUS BY PCR: NOT DETECTED
METAPNEUMOVIRUS BY PCR: NOT DETECTED
MISCELLANEOUS 2: ABNORMAL
MISCELLANEOUS 2: ABNORMAL
MODE: ABNORMAL
MONOCYTES # BLD: 10.2 %
MONOCYTES # BLD: 2 %
MONOCYTES # BLD: 2 %
MONOCYTES # BLD: 2.6 %
MONOCYTES # BLD: 3.5 %
MONOCYTES # BLD: 4 %
MONOCYTES # BLD: 4.5 %
MONOCYTES # BLD: 5.5 %
MONOCYTES # BLD: 5.7 %
MONOCYTES # BLD: 5.8 %
MONOCYTES # BLD: 6.1 %
MONOCYTES # BLD: 6.3 %
MONOCYTES # BLD: 7 %
MONOCYTES # BLD: 7.8 %
MONOCYTES # BLD: 8 %
MONOCYTES # BLD: 8.1 %
MONOCYTES # BLD: 8.2 %
MONOCYTES # BLD: 9 %
MONOCYTES # BLD: 9.7 %
MONOCYTES ABSOLUTE: 0.3 THOU/MM3 (ref 0.4–1.3)
MONOCYTES ABSOLUTE: 0.3 THOU/MM3 (ref 0.4–1.3)
MONOCYTES ABSOLUTE: 0.4 THOU/MM3 (ref 0.4–1.3)
MONOCYTES ABSOLUTE: 0.4 THOU/MM3 (ref 0.4–1.3)
MONOCYTES ABSOLUTE: 0.5 THOU/MM3 (ref 0.4–1.3)
MONOCYTES ABSOLUTE: 0.5 THOU/MM3 (ref 0.4–1.3)
MONOCYTES ABSOLUTE: 0.6 THOU/MM3 (ref 0.4–1.3)
MONOCYTES ABSOLUTE: 0.8 THOU/MM3 (ref 0.4–1.3)
MONOCYTES ABSOLUTE: 0.8 THOU/MM3 (ref 0.4–1.3)
MONOCYTES ABSOLUTE: 1.1 THOU/MM3 (ref 0.4–1.3)
MONOCYTES ABSOLUTE: 1.3 THOU/MM3 (ref 0.4–1.3)
MONOCYTES ABSOLUTE: 1.5 THOU/MM3 (ref 0.4–1.3)
MONOCYTES ABSOLUTE: 1.6 THOU/MM3 (ref 0.4–1.3)
MONOCYTES ABSOLUTE: 1.9 THOU/MM3 (ref 0.4–1.3)
MONOCYTES ABSOLUTE: 2.1 THOU/MM3 (ref 0.4–1.3)
MONOCYTES ABSOLUTE: 3 THOU/MM3 (ref 0.4–1.3)
MONOCYTES ABSOLUTE: 3.5 THOU/MM3 (ref 0.4–1.3)
MONOCYTES ABSOLUTE: 4 THOU/MM3 (ref 0.4–1.3)
MONOCYTES ABSOLUTE: 4.4 THOU/MM3 (ref 0.4–1.3)
MORAXELLA CATARRHALIS BY PCR: NOT DETECTED
MORAXELLA CATARRHALIS BY PCR: NOT DETECTED
MRSA SCREEN RT-PCR: NEGATIVE
MRSA SCREEN: NORMAL
MYCOPLASMA PNEUMONIAE BY PCR: NOT DETECTED
MYCOPLASMA PNEUMONIAE BY PCR: NOT DETECTED
NITRITE, URINE: NEGATIVE
NITRITE, URINE: NEGATIVE
NON-SARS CORONAVIRUS: NOT DETECTED
NON-SARS CORONAVIRUS: NOT DETECTED
NUCLEATED RED BLOOD CELLS: 0 /100 WBC
NUCLEATED RED BLOOD CELLS: 1 /100 WBC
NUCLEATED RED BLOOD CELLS: 2 /100 WBC
NUCLEATED RED BLOOD CELLS: 2 /100 WBC
NUCLEATED RED BLOOD CELLS: 5 /100 WBC
O2 SATURATION: 77 %
O2 SATURATION: 83 %
O2 SATURATION: 86 %
O2 SATURATION: 87 %
O2 SATURATION: 88 %
O2 SATURATION: 88 %
O2 SATURATION: 89 %
O2 SATURATION: 90 %
O2 SATURATION: 91 %
O2 SATURATION: 93 %
O2 SATURATION: 95 %
ORGANISM: ABNORMAL
ORGANISM: ABNORMAL
OSMOLALITY CALCULATION: 264.4 MOSMOL/KG (ref 275–300)
PARAINFLUENZA VIRUS BY PCR: NOT DETECTED
PARAINFLUENZA VIRUS BY PCR: NOT DETECTED
PATHOLOGIST REVIEW: ABNORMAL
PCO2: 20 MMHG (ref 35–45)
PCO2: 23 MMHG (ref 35–45)
PCO2: 32 MMHG (ref 35–45)
PCO2: 32 MMHG (ref 35–45)
PCO2: 34 MMHG (ref 35–45)
PCO2: 35 MMHG (ref 35–45)
PCO2: 51 MMHG (ref 35–45)
PCO2: 52 MMHG (ref 35–45)
PCO2: 61 MMHG (ref 35–45)
PCO2: 63 MMHG (ref 35–45)
PCO2: 70 MMHG (ref 35–45)
PH BLOOD GAS: 7.15 (ref 7.35–7.45)
PH BLOOD GAS: 7.2 (ref 7.35–7.45)
PH BLOOD GAS: 7.23 (ref 7.35–7.45)
PH BLOOD GAS: 7.29 (ref 7.35–7.45)
PH BLOOD GAS: 7.29 (ref 7.35–7.45)
PH BLOOD GAS: 7.46 (ref 7.35–7.45)
PH BLOOD GAS: 7.47 (ref 7.35–7.45)
PH BLOOD GAS: 7.47 (ref 7.35–7.45)
PH BLOOD GAS: 7.51 (ref 7.35–7.45)
PH BLOOD GAS: 7.52 (ref 7.35–7.45)
PH BLOOD GAS: 7.52 (ref 7.35–7.45)
PH UA: 5 (ref 5–9)
PH UA: 6 (ref 5–9)
PHOSPHORUS: 5.6 MG/DL (ref 2.4–4.7)
PIP: 16 CMH2O
PLATELET # BLD: 109 THOU/MM3 (ref 130–400)
PLATELET # BLD: 124 THOU/MM3 (ref 130–400)
PLATELET # BLD: 16 THOU/MM3 (ref 130–400)
PLATELET # BLD: 166 THOU/MM3 (ref 130–400)
PLATELET # BLD: 18 THOU/MM3 (ref 130–400)
PLATELET # BLD: 18 THOU/MM3 (ref 130–400)
PLATELET # BLD: 183 THOU/MM3 (ref 130–400)
PLATELET # BLD: 186 THOU/MM3 (ref 130–400)
PLATELET # BLD: 186 THOU/MM3 (ref 130–400)
PLATELET # BLD: 20 THOU/MM3 (ref 130–400)
PLATELET # BLD: 228 THOU/MM3 (ref 130–400)
PLATELET # BLD: 249 THOU/MM3 (ref 130–400)
PLATELET # BLD: 25 THOU/MM3 (ref 130–400)
PLATELET # BLD: 251 THOU/MM3 (ref 130–400)
PLATELET # BLD: 252 THOU/MM3 (ref 130–400)
PLATELET # BLD: 254 THOU/MM3 (ref 130–400)
PLATELET # BLD: 28 THOU/MM3 (ref 130–400)
PLATELET # BLD: 281 THOU/MM3 (ref 130–400)
PLATELET # BLD: 283 THOU/MM3 (ref 130–400)
PLATELET # BLD: 29 THOU/MM3 (ref 130–400)
PLATELET # BLD: 32 THOU/MM3 (ref 130–400)
PLATELET # BLD: 36 THOU/MM3 (ref 130–400)
PLATELET # BLD: 64 THOU/MM3 (ref 130–400)
PLATELET # BLD: 87 THOU/MM3 (ref 130–400)
PLATELET ESTIMATE: ABNORMAL
PLATELET ESTIMATE: ADEQUATE
PMV BLD AUTO: 10.2 FL (ref 9.4–12.4)
PMV BLD AUTO: 10.3 FL (ref 9.4–12.4)
PMV BLD AUTO: 10.4 FL (ref 9.4–12.4)
PMV BLD AUTO: 10.4 FL (ref 9.4–12.4)
PMV BLD AUTO: 10.5 FL (ref 9.4–12.4)
PMV BLD AUTO: 10.6 FL (ref 9.4–12.4)
PMV BLD AUTO: 11.4 FL (ref 9.4–12.4)
PMV BLD AUTO: 11.5 FL (ref 9.4–12.4)
PMV BLD AUTO: 11.6 FL (ref 9.4–12.4)
PMV BLD AUTO: 11.8 FL (ref 9.4–12.4)
PMV BLD AUTO: 11.8 FL (ref 9.4–12.4)
PMV BLD AUTO: 12 FL (ref 9.4–12.4)
PMV BLD AUTO: 12.7 FL (ref 9.4–12.4)
PMV BLD AUTO: 12.7 FL (ref 9.4–12.4)
PMV BLD AUTO: 13.4 FL (ref 9.4–12.4)
PMV BLD AUTO: 9.8 FL (ref 9.4–12.4)
PMV BLD AUTO: 9.9 FL (ref 9.4–12.4)
PMV BLD AUTO: ABNORMAL FL (ref 9.4–12.4)
PO2: 47 MMHG (ref 71–104)
PO2: 49 MMHG (ref 71–104)
PO2: 51 MMHG (ref 71–104)
PO2: 52 MMHG (ref 71–104)
PO2: 53 MMHG (ref 71–104)
PO2: 59 MMHG (ref 71–104)
PO2: 61 MMHG (ref 71–104)
PO2: 64 MMHG (ref 71–104)
PO2: 65 MMHG (ref 71–104)
PO2: 66 MMHG (ref 71–104)
PO2: 68 MMHG (ref 71–104)
POIKILOCYTES: ABNORMAL
POIKILOCYTES: ABNORMAL
POTASSIUM REFLEX MAGNESIUM: 4.1 MEQ/L (ref 3.5–5.2)
POTASSIUM REFLEX MAGNESIUM: 4.4 MEQ/L (ref 3.5–5.2)
POTASSIUM SERPL-SCNC: 3.3 MEQ/L (ref 3.5–5.2)
POTASSIUM SERPL-SCNC: 3.6 MEQ/L (ref 3.5–5.2)
POTASSIUM SERPL-SCNC: 4 MEQ/L (ref 3.5–5.2)
POTASSIUM SERPL-SCNC: 4.1 MEQ/L (ref 3.5–5.2)
POTASSIUM SERPL-SCNC: 4.4 MEQ/L (ref 3.5–5.2)
POTASSIUM SERPL-SCNC: 4.5 MEQ/L (ref 3.5–5.2)
POTASSIUM SERPL-SCNC: 4.7 MEQ/L (ref 3.5–5.2)
POTASSIUM SERPL-SCNC: 4.8 MEQ/L (ref 3.5–5.2)
POTASSIUM SERPL-SCNC: 4.9 MEQ/L (ref 3.5–5.2)
POTASSIUM SERPL-SCNC: 5 MEQ/L (ref 3.5–5.2)
POTASSIUM SERPL-SCNC: 5.1 MEQ/L (ref 3.5–5.2)
POTASSIUM SERPL-SCNC: 5.1 MEQ/L (ref 3.5–5.2)
POTASSIUM SERPL-SCNC: 5.2 MEQ/L (ref 3.5–5.2)
POTASSIUM SERPL-SCNC: 5.4 MEQ/L (ref 3.5–5.2)
POTASSIUM SERPL-SCNC: 5.4 MEQ/L (ref 3.5–5.2)
POTASSIUM SERPL-SCNC: 5.7 MEQ/L (ref 3.5–5.2)
POTASSIUM SERPL-SCNC: 5.7 MEQ/L (ref 3.5–5.2)
POTASSIUM, URINE: 37.3 MEQ/L
PRO-BNP: 220.6 PG/ML (ref 0–1800)
PRO-BNP: 563 PG/ML (ref 0–1800)
PROCALCITONIN: 0.13 NG/ML (ref 0.01–0.09)
PROCALCITONIN: 0.19 NG/ML (ref 0.01–0.09)
PROCALCITONIN: 0.23 NG/ML (ref 0.01–0.09)
PROCALCITONIN: 0.35 NG/ML (ref 0.01–0.09)
PROCALCITONIN: 0.94 NG/ML (ref 0.01–0.09)
PROTEIN UA: 100
PROTEIN UA: 30
PROTEUS SPECIES BY PCR: NOT DETECTED
PROTEUS SPECIES BY PCR: NOT DETECTED
PSEUDOMONAS AERUGINOSA BY PCR: NOT DETECTED
PSEUDOMONAS AERUGINOSA BY PCR: NOT DETECTED
RBC # BLD: 2.09 MILL/MM3 (ref 4.2–5.4)
RBC # BLD: 2.45 MILL/MM3 (ref 4.2–5.4)
RBC # BLD: 2.47 MILL/MM3 (ref 4.2–5.4)
RBC # BLD: 2.52 MILL/MM3 (ref 4.2–5.4)
RBC # BLD: 2.82 MILL/MM3 (ref 4.2–5.4)
RBC # BLD: 2.94 MILL/MM3 (ref 4.2–5.4)
RBC # BLD: 2.96 MILL/MM3 (ref 4.2–5.4)
RBC # BLD: 3.26 MILL/MM3 (ref 4.2–5.4)
RBC # BLD: 3.31 MILL/MM3 (ref 4.2–5.4)
RBC # BLD: 3.39 MILL/MM3 (ref 4.2–5.4)
RBC # BLD: 3.73 MILL/MM3 (ref 4.2–5.4)
RBC # BLD: 4.21 MILL/MM3 (ref 4.2–5.4)
RBC # BLD: 4.21 MILL/MM3 (ref 4.2–5.4)
RBC # BLD: 4.61 MILL/MM3 (ref 4.2–5.4)
RBC # BLD: 4.63 MILL/MM3 (ref 4.2–5.4)
RBC # BLD: 4.65 MILL/MM3 (ref 4.2–5.4)
RBC # BLD: 4.71 MILL/MM3 (ref 4.2–5.4)
RBC # BLD: 4.72 MILL/MM3 (ref 4.2–5.4)
RBC # BLD: 4.73 MILL/MM3 (ref 4.2–5.4)
RBC # BLD: 4.81 MILL/MM3 (ref 4.2–5.4)
RBC URINE: ABNORMAL /HPF
RBC URINE: ABNORMAL /HPF
REACTION TIME RAPID TEG W HEP: 0.8 MINUTES (ref 0.4–1)
REACTION TIME RAPID TEG: 0.7 MINUTES (ref 0.4–1)
RENAL EPITHELIAL, UA: ABNORMAL
RENAL EPITHELIAL, UA: ABNORMAL
RESISTANT GENE CTX-M BY PCR: NORMAL
RESISTANT GENE CTX-M BY PCR: NORMAL
RESISTANT GENE IMP BY PCR: NORMAL
RESISTANT GENE IMP BY PCR: NORMAL
RESISTANT GENE KPC BY PCR: NORMAL
RESISTANT GENE KPC BY PCR: NORMAL
RESISTANT GENE MECA/C & MREJ BY PCR: NORMAL
RESISTANT GENE MECA/C & MREJ BY PCR: NORMAL
RESISTANT GENE NDM BY PCR: NORMAL
RESISTANT GENE NDM BY PCR: NORMAL
RESISTANT GENE OXA-48-LIKE BY PCR: NORMAL
RESISTANT GENE OXA-48-LIKE BY PCR: NORMAL
RESISTANT GENE VIM BY PCR: NORMAL
RESISTANT GENE VIM BY PCR: NORMAL
RESPIRATORY CULTURE: ABNORMAL
RESPIRATORY CULTURE: ABNORMAL
RESPIRATORY CULTURE: NORMAL
RESPIRATORY SYNCYTIAL VIRUS BY PCR: NOT DETECTED
RESPIRATORY SYNCYTIAL VIRUS BY PCR: NOT DETECTED
REVIEWED BY: NORMAL
REVIEWED BY: NORMAL
RH FACTOR: NORMAL
RHINOVIRUS ENTEROVIRUS PCR: NOT DETECTED
RHINOVIRUS ENTEROVIRUS PCR: NOT DETECTED
SARS-COV-2, NAAT: DETECTED
SCAN OF BLOOD SMEAR: NORMAL
SEG NEUTROPHILS: 65.7 %
SEG NEUTROPHILS: 77.7 %
SEG NEUTROPHILS: 82.3 %
SEG NEUTROPHILS: 83.6 %
SEG NEUTROPHILS: 84.8 %
SEG NEUTROPHILS: 85.5 %
SEG NEUTROPHILS: 85.8 %
SEG NEUTROPHILS: 86 %
SEG NEUTROPHILS: 87.3 %
SEG NEUTROPHILS: 87.4 %
SEG NEUTROPHILS: 88 %
SEG NEUTROPHILS: 88.4 %
SEG NEUTROPHILS: 88.6 %
SEG NEUTROPHILS: 88.8 %
SEG NEUTROPHILS: 88.9 %
SEG NEUTROPHILS: 90.7 %
SEG NEUTROPHILS: 91.3 %
SEG NEUTROPHILS: 92.2 %
SEG NEUTROPHILS: 93.3 %
SEGMENTED NEUTROPHILS ABSOLUTE COUNT: 12.8 THOU/MM3 (ref 1.8–7.7)
SEGMENTED NEUTROPHILS ABSOLUTE COUNT: 13 THOU/MM3 (ref 1.8–7.7)
SEGMENTED NEUTROPHILS ABSOLUTE COUNT: 16.6 THOU/MM3 (ref 1.8–7.7)
SEGMENTED NEUTROPHILS ABSOLUTE COUNT: 17.2 THOU/MM3 (ref 1.8–7.7)
SEGMENTED NEUTROPHILS ABSOLUTE COUNT: 17.3 THOU/MM3 (ref 1.8–7.7)
SEGMENTED NEUTROPHILS ABSOLUTE COUNT: 17.5 THOU/MM3 (ref 1.8–7.7)
SEGMENTED NEUTROPHILS ABSOLUTE COUNT: 19.7 THOU/MM3 (ref 1.8–7.7)
SEGMENTED NEUTROPHILS ABSOLUTE COUNT: 2 THOU/MM3 (ref 1.8–7.7)
SEGMENTED NEUTROPHILS ABSOLUTE COUNT: 22 THOU/MM3 (ref 1.8–7.7)
SEGMENTED NEUTROPHILS ABSOLUTE COUNT: 25.1 THOU/MM3 (ref 1.8–7.7)
SEGMENTED NEUTROPHILS ABSOLUTE COUNT: 25.1 THOU/MM3 (ref 1.8–7.7)
SEGMENTED NEUTROPHILS ABSOLUTE COUNT: 31.9 THOU/MM3 (ref 1.8–7.7)
SEGMENTED NEUTROPHILS ABSOLUTE COUNT: 37 THOU/MM3 (ref 1.8–7.7)
SEGMENTED NEUTROPHILS ABSOLUTE COUNT: 37.2 THOU/MM3 (ref 1.8–7.7)
SEGMENTED NEUTROPHILS ABSOLUTE COUNT: 38.4 THOU/MM3 (ref 1.8–7.7)
SEGMENTED NEUTROPHILS ABSOLUTE COUNT: 40.3 THOU/MM3 (ref 1.8–7.7)
SEGMENTED NEUTROPHILS ABSOLUTE COUNT: 5.6 THOU/MM3 (ref 1.8–7.7)
SEGMENTED NEUTROPHILS ABSOLUTE COUNT: 7.2 THOU/MM3 (ref 1.8–7.7)
SEGMENTED NEUTROPHILS ABSOLUTE COUNT: 7.4 THOU/MM3 (ref 1.8–7.7)
SEROTONIN RELEASING ASSAY: NORMAL
SERRATIA MARCESCENS BY PCR: NOT DETECTED
SERRATIA MARCESCENS BY PCR: NOT DETECTED
SET PEEP: 10 MMHG
SET PEEP: 12 MMHG
SET PEEP: 6 MMHG
SET PEEP: 8 MMHG
SET PRESS SUPP: 12 CMH2O
SET PRESS SUPP: 26 CMH2O
SET PRESS SUPP: 6 CMH2O
SET RESPIRATORY RATE: 20 BPM
SET RESPIRATORY RATE: 20 BPM
SET RESPIRATORY RATE: 24 BPM
SMEAR REVIEW: NORMAL
SMEAR REVIEW: NORMAL
SODIUM BLD-SCNC: 127 MEQ/L (ref 135–145)
SODIUM BLD-SCNC: 132 MEQ/L (ref 135–145)
SODIUM BLD-SCNC: 133 MEQ/L (ref 135–145)
SODIUM BLD-SCNC: 134 MEQ/L (ref 135–145)
SODIUM BLD-SCNC: 136 MEQ/L (ref 135–145)
SODIUM BLD-SCNC: 138 MEQ/L (ref 135–145)
SODIUM BLD-SCNC: 139 MEQ/L (ref 135–145)
SODIUM BLD-SCNC: 140 MEQ/L (ref 135–145)
SODIUM BLD-SCNC: 141 MEQ/L (ref 135–145)
SODIUM BLD-SCNC: 142 MEQ/L (ref 135–145)
SODIUM BLD-SCNC: 143 MEQ/L (ref 135–145)
SODIUM BLD-SCNC: 144 MEQ/L (ref 135–145)
SODIUM BLD-SCNC: 145 MEQ/L (ref 135–145)
SODIUM BLD-SCNC: 145 MEQ/L (ref 135–145)
SODIUM BLD-SCNC: 146 MEQ/L (ref 135–145)
SODIUM BLD-SCNC: 146 MEQ/L (ref 135–145)
SODIUM BLD-SCNC: 147 MEQ/L (ref 135–145)
SODIUM BLD-SCNC: 148 MEQ/L (ref 135–145)
SODIUM BLD-SCNC: 149 MEQ/L (ref 135–145)
SODIUM BLD-SCNC: 149 MEQ/L (ref 135–145)
SODIUM URINE: 25 MEQ/L
SODIUM URINE: < 20 MEQ/L
SOURCE, BLOOD GAS: ABNORMAL
SOURCE: NORMAL
SOURCE: NORMAL
SPECIFIC GRAVITY, URINE: 1.02 (ref 1–1.03)
SPECIFIC GRAVITY, URINE: > 1.03 (ref 1–1.03)
SPECIMEN ACCEPTABILITY: NORMAL
SPECIMEN ACCEPTABILITY: NORMAL
SPHEROCYTES: ABNORMAL
SPHEROCYTES: ABNORMAL
STAPH AUREUS BY PCR: NOT DETECTED
STAPH AUREUS BY PCR: NOT DETECTED
STREP AGALACTIAE BY PCR: NOT DETECTED
STREP AGALACTIAE BY PCR: NOT DETECTED
STREP PNEUMONIAE BY PCR: NOT DETECTED
STREP PNEUMONIAE BY PCR: NOT DETECTED
STREP PYOGENES BY PCR: NOT DETECTED
STREP PYOGENES BY PCR: NOT DETECTED
T4 FREE: 0.57 NG/DL (ref 0.93–1.76)
TOTAL PROTEIN: 4 G/DL (ref 6.1–8)
TOTAL PROTEIN: 4.3 G/DL (ref 6.1–8)
TOTAL PROTEIN: 4.4 G/DL (ref 6.1–8)
TOTAL PROTEIN: 6.7 G/DL (ref 6.1–8)
TOTAL PROTEIN: 7 G/DL (ref 6.1–8)
TOXIC GRANULATION: PRESENT
TRIGL SERPL-MCNC: 121 MG/DL (ref 0–199)
TRIGL SERPL-MCNC: 198 MG/DL (ref 0–199)
TRIGL SERPL-MCNC: 265 MG/DL (ref 0–199)
TRIGL SERPL-MCNC: 285 MG/DL (ref 0–199)
TROPONIN T: < 0.01 NG/ML
TSH SERPL DL<=0.05 MIU/L-ACNC: 0.07 UIU/ML (ref 0.4–4.2)
UREA NITROGEN, UR: 931 MG/DL
URINE CULTURE REFLEX: ABNORMAL
URINE CULTURE, ROUTINE: NORMAL
UROBILINOGEN, URINE: 0.2 EU/DL (ref 0–1)
UROBILINOGEN, URINE: 0.2 EU/DL (ref 0–1)
VANCOMYCIN RESISTANT ENTEROCOCCUS: NEGATIVE
VITAMIN B-12: 1544 PG/ML (ref 211–911)
WBC # BLD: 14.3 THOU/MM3 (ref 4.8–10.8)
WBC # BLD: 14.6 THOU/MM3 (ref 4.8–10.8)
WBC # BLD: 18.4 THOU/MM3 (ref 4.8–10.8)
WBC # BLD: 19.8 THOU/MM3 (ref 4.8–10.8)
WBC # BLD: 19.9 THOU/MM3 (ref 4.8–10.8)
WBC # BLD: 20.4 THOU/MM3 (ref 4.8–10.8)
WBC # BLD: 22.5 THOU/MM3 (ref 4.8–10.8)
WBC # BLD: 24.8 THOU/MM3 (ref 4.8–10.8)
WBC # BLD: 27.2 THOU/MM3 (ref 4.8–10.8)
WBC # BLD: 29.3 THOU/MM3 (ref 4.8–10.8)
WBC # BLD: 3 THOU/MM3 (ref 4.8–10.8)
WBC # BLD: 37.3 THOU/MM3 (ref 4.8–10.8)
WBC # BLD: 40.5 THOU/MM3 (ref 4.8–10.8)
WBC # BLD: 42.6 THOU/MM3 (ref 4.8–10.8)
WBC # BLD: 49 THOU/MM3 (ref 4.8–10.8)
WBC # BLD: 49.4 THOU/MM3 (ref 4.8–10.8)
WBC # BLD: 52.3 THOU/MM3 (ref 4.8–10.8)
WBC # BLD: 6.5 THOU/MM3 (ref 4.8–10.8)
WBC # BLD: 8.1 THOU/MM3 (ref 4.8–10.8)
WBC # BLD: 8.3 THOU/MM3 (ref 4.8–10.8)
WBC UA: ABNORMAL /HPF
WBC UA: ABNORMAL /HPF
YEAST: ABNORMAL
YEAST: ABNORMAL

## 2021-01-01 PROCEDURE — 87040 BLOOD CULTURE FOR BACTERIA: CPT

## 2021-01-01 PROCEDURE — 80048 BASIC METABOLIC PNL TOTAL CA: CPT

## 2021-01-01 PROCEDURE — 80053 COMPREHEN METABOLIC PANEL: CPT

## 2021-01-01 PROCEDURE — 99223 1ST HOSP IP/OBS HIGH 75: CPT | Performed by: INTERNAL MEDICINE

## 2021-01-01 PROCEDURE — 6370000000 HC RX 637 (ALT 250 FOR IP): Performed by: INTERNAL MEDICINE

## 2021-01-01 PROCEDURE — 1200000003 HC TELEMETRY R&B

## 2021-01-01 PROCEDURE — 71045 X-RAY EXAM CHEST 1 VIEW: CPT

## 2021-01-01 PROCEDURE — 2580000003 HC RX 258: Performed by: STUDENT IN AN ORGANIZED HEALTH CARE EDUCATION/TRAINING PROGRAM

## 2021-01-01 PROCEDURE — 85025 COMPLETE CBC W/AUTO DIFF WBC: CPT

## 2021-01-01 PROCEDURE — 94761 N-INVAS EAR/PLS OXIMETRY MLT: CPT

## 2021-01-01 PROCEDURE — 99291 CRITICAL CARE FIRST HOUR: CPT | Performed by: INTERNAL MEDICINE

## 2021-01-01 PROCEDURE — 6370000000 HC RX 637 (ALT 250 FOR IP): Performed by: STUDENT IN AN ORGANIZED HEALTH CARE EDUCATION/TRAINING PROGRAM

## 2021-01-01 PROCEDURE — 94660 CPAP INITIATION&MGMT: CPT

## 2021-01-01 PROCEDURE — 2500000003 HC RX 250 WO HCPCS: Performed by: INTERNAL MEDICINE

## 2021-01-01 PROCEDURE — 82728 ASSAY OF FERRITIN: CPT

## 2021-01-01 PROCEDURE — XW033N5 INTRODUCTION OF MEROPENEM-VABORBACTAM ANTI-INFECTIVE INTO PERIPHERAL VEIN, PERCUTANEOUS APPROACH, NEW TECHNOLOGY GROUP 5: ICD-10-PCS | Performed by: INTERNAL MEDICINE

## 2021-01-01 PROCEDURE — 2060000000 HC ICU INTERMEDIATE R&B

## 2021-01-01 PROCEDURE — 94003 VENT MGMT INPAT SUBQ DAY: CPT

## 2021-01-01 PROCEDURE — P9047 ALBUMIN (HUMAN), 25%, 50ML: HCPCS | Performed by: INTERNAL MEDICINE

## 2021-01-01 PROCEDURE — 36415 COLL VENOUS BLD VENIPUNCTURE: CPT

## 2021-01-01 PROCEDURE — 99233 SBSQ HOSP IP/OBS HIGH 50: CPT | Performed by: INTERNAL MEDICINE

## 2021-01-01 PROCEDURE — 6360000002 HC RX W HCPCS: Performed by: INTERNAL MEDICINE

## 2021-01-01 PROCEDURE — 87541 LEGION PNEUMO DNA AMP PROB: CPT

## 2021-01-01 PROCEDURE — 36592 COLLECT BLOOD FROM PICC: CPT

## 2021-01-01 PROCEDURE — 82570 ASSAY OF URINE CREATININE: CPT

## 2021-01-01 PROCEDURE — C9113 INJ PANTOPRAZOLE SODIUM, VIA: HCPCS | Performed by: STUDENT IN AN ORGANIZED HEALTH CARE EDUCATION/TRAINING PROGRAM

## 2021-01-01 PROCEDURE — 83615 LACTATE (LD) (LDH) ENZYME: CPT

## 2021-01-01 PROCEDURE — 2580000003 HC RX 258: Performed by: INTERNAL MEDICINE

## 2021-01-01 PROCEDURE — 2500000003 HC RX 250 WO HCPCS

## 2021-01-01 PROCEDURE — 2580000003 HC RX 258: Performed by: SURGERY

## 2021-01-01 PROCEDURE — 85379 FIBRIN DEGRADATION QUANT: CPT

## 2021-01-01 PROCEDURE — 6360000004 HC RX CONTRAST MEDICATION: Performed by: STUDENT IN AN ORGANIZED HEALTH CARE EDUCATION/TRAINING PROGRAM

## 2021-01-01 PROCEDURE — 84478 ASSAY OF TRIGLYCERIDES: CPT

## 2021-01-01 PROCEDURE — 2700000000 HC OXYGEN THERAPY PER DAY

## 2021-01-01 PROCEDURE — 6370000000 HC RX 637 (ALT 250 FOR IP): Performed by: PHYSICIAN ASSISTANT

## 2021-01-01 PROCEDURE — 36430 TRANSFUSION BLD/BLD COMPNT: CPT

## 2021-01-01 PROCEDURE — P9047 ALBUMIN (HUMAN), 25%, 50ML: HCPCS | Performed by: STUDENT IN AN ORGANIZED HEALTH CARE EDUCATION/TRAINING PROGRAM

## 2021-01-01 PROCEDURE — 6360000002 HC RX W HCPCS: Performed by: STUDENT IN AN ORGANIZED HEALTH CARE EDUCATION/TRAINING PROGRAM

## 2021-01-01 PROCEDURE — 93005 ELECTROCARDIOGRAM TRACING: CPT | Performed by: STUDENT IN AN ORGANIZED HEALTH CARE EDUCATION/TRAINING PROGRAM

## 2021-01-01 PROCEDURE — 93005 ELECTROCARDIOGRAM TRACING: CPT | Performed by: PHYSICIAN ASSISTANT

## 2021-01-01 PROCEDURE — 82803 BLOOD GASES ANY COMBINATION: CPT

## 2021-01-01 PROCEDURE — 6360000002 HC RX W HCPCS: Performed by: PHYSICIAN ASSISTANT

## 2021-01-01 PROCEDURE — 93005 ELECTROCARDIOGRAM TRACING: CPT | Performed by: INTERNAL MEDICINE

## 2021-01-01 PROCEDURE — 2500000003 HC RX 250 WO HCPCS: Performed by: SURGERY

## 2021-01-01 PROCEDURE — 84300 ASSAY OF URINE SODIUM: CPT

## 2021-01-01 PROCEDURE — 82948 REAGENT STRIP/BLOOD GLUCOSE: CPT

## 2021-01-01 PROCEDURE — 87641 MR-STAPH DNA AMP PROBE: CPT

## 2021-01-01 PROCEDURE — 2100000000 HC CCU R&B

## 2021-01-01 PROCEDURE — 84443 ASSAY THYROID STIM HORMONE: CPT

## 2021-01-01 PROCEDURE — 71275 CT ANGIOGRAPHY CHEST: CPT

## 2021-01-01 PROCEDURE — 85027 COMPLETE CBC AUTOMATED: CPT

## 2021-01-01 PROCEDURE — 87147 CULTURE TYPE IMMUNOLOGIC: CPT

## 2021-01-01 PROCEDURE — 84145 PROCALCITONIN (PCT): CPT

## 2021-01-01 PROCEDURE — 99233 SBSQ HOSP IP/OBS HIGH 50: CPT | Performed by: PHYSICIAN ASSISTANT

## 2021-01-01 PROCEDURE — 81001 URINALYSIS AUTO W/SCOPE: CPT

## 2021-01-01 PROCEDURE — 6370000000 HC RX 637 (ALT 250 FOR IP): Performed by: NURSE PRACTITIONER

## 2021-01-01 PROCEDURE — 36600 WITHDRAWAL OF ARTERIAL BLOOD: CPT

## 2021-01-01 PROCEDURE — 94640 AIRWAY INHALATION TREATMENT: CPT

## 2021-01-01 PROCEDURE — 6360000002 HC RX W HCPCS: Performed by: PEDIATRICS

## 2021-01-01 PROCEDURE — 89220 SPUTUM SPECIMEN COLLECTION: CPT

## 2021-01-01 PROCEDURE — 83605 ASSAY OF LACTIC ACID: CPT

## 2021-01-01 PROCEDURE — 85049 AUTOMATED PLATELET COUNT: CPT

## 2021-01-01 PROCEDURE — 87081 CULTURE SCREEN ONLY: CPT

## 2021-01-01 PROCEDURE — 84295 ASSAY OF SERUM SODIUM: CPT

## 2021-01-01 PROCEDURE — 86901 BLOOD TYPING SEROLOGIC RH(D): CPT

## 2021-01-01 PROCEDURE — 87631 RESP VIRUS 3-5 TARGETS: CPT

## 2021-01-01 PROCEDURE — 87581 M.PNEUMON DNA AMP PROBE: CPT

## 2021-01-01 PROCEDURE — 85018 HEMOGLOBIN: CPT

## 2021-01-01 PROCEDURE — 82248 BILIRUBIN DIRECT: CPT

## 2021-01-01 PROCEDURE — 86140 C-REACTIVE PROTEIN: CPT

## 2021-01-01 PROCEDURE — 85014 HEMATOCRIT: CPT

## 2021-01-01 PROCEDURE — 87086 URINE CULTURE/COLONY COUNT: CPT

## 2021-01-01 PROCEDURE — 86022 PLATELET ANTIBODIES: CPT

## 2021-01-01 PROCEDURE — 87635 SARS-COV-2 COVID-19 AMP PRB: CPT

## 2021-01-01 PROCEDURE — 0BP1XDZ REMOVAL OF INTRALUMINAL DEVICE FROM TRACHEA, EXTERNAL APPROACH: ICD-10-PCS | Performed by: INTERNAL MEDICINE

## 2021-01-01 PROCEDURE — 84100 ASSAY OF PHOSPHORUS: CPT

## 2021-01-01 PROCEDURE — 99232 SBSQ HOSP IP/OBS MODERATE 35: CPT | Performed by: INTERNAL MEDICINE

## 2021-01-01 PROCEDURE — 85362 FIBRIN DEGRADATION PRODUCTS: CPT

## 2021-01-01 PROCEDURE — 99291 CRITICAL CARE FIRST HOUR: CPT | Performed by: SURGERY

## 2021-01-01 PROCEDURE — 94002 VENT MGMT INPAT INIT DAY: CPT

## 2021-01-01 PROCEDURE — 87500 VANOMYCIN DNA AMP PROBE: CPT

## 2021-01-01 PROCEDURE — 6360000002 HC RX W HCPCS

## 2021-01-01 PROCEDURE — 84484 ASSAY OF TROPONIN QUANT: CPT

## 2021-01-01 PROCEDURE — 31500 INSERT EMERGENCY AIRWAY: CPT

## 2021-01-01 PROCEDURE — 99233 SBSQ HOSP IP/OBS HIGH 50: CPT | Performed by: STUDENT IN AN ORGANIZED HEALTH CARE EDUCATION/TRAINING PROGRAM

## 2021-01-01 PROCEDURE — 87205 SMEAR GRAM STAIN: CPT

## 2021-01-01 PROCEDURE — 82607 VITAMIN B-12: CPT

## 2021-01-01 PROCEDURE — 85730 THROMBOPLASTIN TIME PARTIAL: CPT

## 2021-01-01 PROCEDURE — 93306 TTE W/DOPPLER COMPLETE: CPT

## 2021-01-01 PROCEDURE — 6360000004 HC RX CONTRAST MEDICATION: Performed by: EMERGENCY MEDICINE

## 2021-01-01 PROCEDURE — 94760 N-INVAS EAR/PLS OXIMETRY 1: CPT

## 2021-01-01 PROCEDURE — 93005 ELECTROCARDIOGRAM TRACING: CPT | Performed by: FAMILY MEDICINE

## 2021-01-01 PROCEDURE — 76770 US EXAM ABDO BACK WALL COMP: CPT

## 2021-01-01 PROCEDURE — 82533 TOTAL CORTISOL: CPT

## 2021-01-01 PROCEDURE — 82040 ASSAY OF SERUM ALBUMIN: CPT

## 2021-01-01 PROCEDURE — 83880 ASSAY OF NATRIURETIC PEPTIDE: CPT

## 2021-01-01 PROCEDURE — 84439 ASSAY OF FREE THYROXINE: CPT

## 2021-01-01 PROCEDURE — 2580000003 HC RX 258: Performed by: NURSE PRACTITIONER

## 2021-01-01 PROCEDURE — 99285 EMERGENCY DEPT VISIT HI MDM: CPT

## 2021-01-01 PROCEDURE — 84540 ASSAY OF URINE/UREA-N: CPT

## 2021-01-01 PROCEDURE — 82746 ASSAY OF FOLIC ACID SERUM: CPT

## 2021-01-01 PROCEDURE — 84132 ASSAY OF SERUM POTASSIUM: CPT

## 2021-01-01 PROCEDURE — 02HV33Z INSERTION OF INFUSION DEVICE INTO SUPERIOR VENA CAVA, PERCUTANEOUS APPROACH: ICD-10-PCS | Performed by: STUDENT IN AN ORGANIZED HEALTH CARE EDUCATION/TRAINING PROGRAM

## 2021-01-01 PROCEDURE — 87070 CULTURE OTHR SPECIMN AEROBIC: CPT

## 2021-01-01 PROCEDURE — 99232 SBSQ HOSP IP/OBS MODERATE 35: CPT | Performed by: STUDENT IN AN ORGANIZED HEALTH CARE EDUCATION/TRAINING PROGRAM

## 2021-01-01 PROCEDURE — P9037 PLATE PHERES LEUKOREDU IRRAD: HCPCS

## 2021-01-01 PROCEDURE — 2500000003 HC RX 250 WO HCPCS: Performed by: STUDENT IN AN ORGANIZED HEALTH CARE EDUCATION/TRAINING PROGRAM

## 2021-01-01 PROCEDURE — 36556 INSERT NON-TUNNEL CV CATH: CPT

## 2021-01-01 PROCEDURE — 83735 ASSAY OF MAGNESIUM: CPT

## 2021-01-01 PROCEDURE — 87486 CHLMYD PNEUM DNA AMP PROBE: CPT

## 2021-01-01 PROCEDURE — 37799 UNLISTED PX VASCULAR SURGERY: CPT

## 2021-01-01 PROCEDURE — 85385 FIBRINOGEN ANTIGEN: CPT

## 2021-01-01 PROCEDURE — 74018 RADEX ABDOMEN 1 VIEW: CPT

## 2021-01-01 PROCEDURE — APPSS180 APP SPLIT SHARED TIME > 60 MINUTES: Performed by: NURSE PRACTITIONER

## 2021-01-01 PROCEDURE — 85610 PROTHROMBIN TIME: CPT

## 2021-01-01 PROCEDURE — 87798 DETECT AGENT NOS DNA AMP: CPT

## 2021-01-01 PROCEDURE — 86923 COMPATIBILITY TEST ELECTRIC: CPT

## 2021-01-01 PROCEDURE — 86850 RBC ANTIBODY SCREEN: CPT

## 2021-01-01 PROCEDURE — 80076 HEPATIC FUNCTION PANEL: CPT

## 2021-01-01 PROCEDURE — 84133 ASSAY OF URINE POTASSIUM: CPT

## 2021-01-01 PROCEDURE — P9016 RBC LEUKOCYTES REDUCED: HCPCS

## 2021-01-01 PROCEDURE — 86900 BLOOD TYPING SEROLOGIC ABO: CPT

## 2021-01-01 PROCEDURE — 93010 ELECTROCARDIOGRAM REPORT: CPT | Performed by: NUCLEAR MEDICINE

## 2021-01-01 PROCEDURE — 99232 SBSQ HOSP IP/OBS MODERATE 35: CPT | Performed by: PHYSICIAN ASSISTANT

## 2021-01-01 PROCEDURE — 31500 INSERT EMERGENCY AIRWAY: CPT | Performed by: INTERNAL MEDICINE

## 2021-01-01 PROCEDURE — 74177 CT ABD & PELVIS W/CONTRAST: CPT

## 2021-01-01 PROCEDURE — 82330 ASSAY OF CALCIUM: CPT

## 2021-01-01 PROCEDURE — 5A1955Z RESPIRATORY VENTILATION, GREATER THAN 96 CONSECUTIVE HOURS: ICD-10-PCS | Performed by: INTERNAL MEDICINE

## 2021-01-01 PROCEDURE — 0BH18EZ INSERTION OF ENDOTRACHEAL AIRWAY INTO TRACHEA, VIA NATURAL OR ARTIFICIAL OPENING ENDOSCOPIC: ICD-10-PCS | Performed by: INTERNAL MEDICINE

## 2021-01-01 RX ORDER — POTASSIUM CHLORIDE 29.8 MG/ML
20 INJECTION INTRAVENOUS
Status: ACTIVE | OUTPATIENT
Start: 2021-01-01 | End: 2021-01-01

## 2021-01-01 RX ORDER — DEXAMETHASONE 4 MG/1
6 TABLET ORAL DAILY
Status: DISCONTINUED | OUTPATIENT
Start: 2021-01-01 | End: 2021-01-01

## 2021-01-01 RX ORDER — SODIUM CHLORIDE 9 MG/ML
25 INJECTION, SOLUTION INTRAVENOUS PRN
Status: DISCONTINUED | OUTPATIENT
Start: 2021-01-01 | End: 2021-01-01 | Stop reason: HOSPADM

## 2021-01-01 RX ORDER — INSULIN GLARGINE 100 [IU]/ML
25 INJECTION, SOLUTION SUBCUTANEOUS NIGHTLY
Status: DISCONTINUED | OUTPATIENT
Start: 2021-01-01 | End: 2021-01-01

## 2021-01-01 RX ORDER — DEXAMETHASONE SODIUM PHOSPHATE 4 MG/ML
10 INJECTION, SOLUTION INTRA-ARTICULAR; INTRALESIONAL; INTRAMUSCULAR; INTRAVENOUS; SOFT TISSUE ONCE
Status: COMPLETED | OUTPATIENT
Start: 2021-01-01 | End: 2021-01-01

## 2021-01-01 RX ORDER — ASPIRIN 81 MG/1
81 TABLET ORAL DAILY
Status: DISCONTINUED | OUTPATIENT
Start: 2021-01-01 | End: 2021-01-01 | Stop reason: ALTCHOICE

## 2021-01-01 RX ORDER — DEXTROSE MONOHYDRATE 50 MG/ML
INJECTION, SOLUTION INTRAVENOUS CONTINUOUS
Status: DISCONTINUED | OUTPATIENT
Start: 2021-01-01 | End: 2021-01-01

## 2021-01-01 RX ORDER — INSULIN GLARGINE 100 [IU]/ML
21 INJECTION, SOLUTION SUBCUTANEOUS NIGHTLY
Status: DISCONTINUED | OUTPATIENT
Start: 2021-01-01 | End: 2021-01-01

## 2021-01-01 RX ORDER — SODIUM CHLORIDE 9 MG/ML
INJECTION, SOLUTION INTRAVENOUS CONTINUOUS
Status: DISCONTINUED | OUTPATIENT
Start: 2021-01-01 | End: 2021-01-01

## 2021-01-01 RX ORDER — SODIUM CHLORIDE 0.9 % (FLUSH) 0.9 %
5-40 SYRINGE (ML) INJECTION EVERY 12 HOURS SCHEDULED
Status: DISCONTINUED | OUTPATIENT
Start: 2021-01-01 | End: 2021-01-01 | Stop reason: SDUPTHER

## 2021-01-01 RX ORDER — PROPOFOL 10 MG/ML
INJECTION, EMULSION INTRAVENOUS
Status: DISPENSED
Start: 2021-01-01 | End: 2021-01-01

## 2021-01-01 RX ORDER — POLYETHYLENE GLYCOL 3350 17 G/17G
17 POWDER, FOR SOLUTION ORAL DAILY PRN
Status: DISCONTINUED | OUTPATIENT
Start: 2021-01-01 | End: 2021-01-01 | Stop reason: HOSPADM

## 2021-01-01 RX ORDER — MORPHINE SULFATE 2 MG/ML
2 INJECTION, SOLUTION INTRAMUSCULAR; INTRAVENOUS ONCE
Status: COMPLETED | OUTPATIENT
Start: 2021-01-01 | End: 2021-01-01

## 2021-01-01 RX ORDER — DEXTROSE MONOHYDRATE 50 MG/ML
100 INJECTION, SOLUTION INTRAVENOUS PRN
Status: DISCONTINUED | OUTPATIENT
Start: 2021-01-01 | End: 2021-01-01 | Stop reason: HOSPADM

## 2021-01-01 RX ORDER — 0.9 % SODIUM CHLORIDE 0.9 %
1000 INTRAVENOUS SOLUTION INTRAVENOUS ONCE
Status: COMPLETED | OUTPATIENT
Start: 2021-01-01 | End: 2021-01-01

## 2021-01-01 RX ORDER — KETAMINE HCL IN NACL, ISO-OSM 100MG/10ML
SYRINGE (ML) INJECTION
Status: COMPLETED
Start: 2021-01-01 | End: 2021-01-01

## 2021-01-01 RX ORDER — FOLIC ACID 1 MG/1
1 TABLET ORAL DAILY
Status: DISCONTINUED | OUTPATIENT
Start: 2021-01-01 | End: 2021-01-01

## 2021-01-01 RX ORDER — ACETAMINOPHEN 325 MG/1
650 TABLET ORAL EVERY 6 HOURS PRN
Status: DISCONTINUED | OUTPATIENT
Start: 2021-01-01 | End: 2021-01-01 | Stop reason: SDUPTHER

## 2021-01-01 RX ORDER — METOPROLOL SUCCINATE 50 MG/1
50 TABLET, EXTENDED RELEASE ORAL DAILY
Status: DISCONTINUED | OUTPATIENT
Start: 2021-01-01 | End: 2021-01-01

## 2021-01-01 RX ORDER — BUMETANIDE 0.25 MG/ML
0.5 INJECTION, SOLUTION INTRAMUSCULAR; INTRAVENOUS EVERY 12 HOURS
Status: DISCONTINUED | OUTPATIENT
Start: 2021-01-01 | End: 2021-01-01

## 2021-01-01 RX ORDER — POTASSIUM CHLORIDE 20 MEQ/1
40 TABLET, EXTENDED RELEASE ORAL PRN
Status: DISCONTINUED | OUTPATIENT
Start: 2021-01-01 | End: 2021-01-01 | Stop reason: HOSPADM

## 2021-01-01 RX ORDER — ALBUMIN (HUMAN) 12.5 G/50ML
12.5 SOLUTION INTRAVENOUS EVERY 12 HOURS
Status: COMPLETED | OUTPATIENT
Start: 2021-01-01 | End: 2021-01-01

## 2021-01-01 RX ORDER — ALBUTEROL SULFATE 90 UG/1
2 AEROSOL, METERED RESPIRATORY (INHALATION) EVERY 6 HOURS PRN
Status: DISCONTINUED | OUTPATIENT
Start: 2021-01-01 | End: 2021-01-01 | Stop reason: HOSPADM

## 2021-01-01 RX ORDER — ACETAMINOPHEN 500 MG
1000 TABLET ORAL ONCE
Status: COMPLETED | OUTPATIENT
Start: 2021-01-01 | End: 2021-01-01

## 2021-01-01 RX ORDER — HEPARIN SODIUM 5000 [USP'U]/ML
5000 INJECTION, SOLUTION INTRAVENOUS; SUBCUTANEOUS EVERY 8 HOURS SCHEDULED
Status: DISCONTINUED | OUTPATIENT
Start: 2021-01-01 | End: 2021-01-01

## 2021-01-01 RX ORDER — POTASSIUM CHLORIDE 29.8 MG/ML
20 INJECTION INTRAVENOUS
Status: COMPLETED | OUTPATIENT
Start: 2021-01-01 | End: 2021-01-01

## 2021-01-01 RX ORDER — NOREPINEPHRINE BIT/0.9 % NACL 16MG/250ML
INFUSION BOTTLE (ML) INTRAVENOUS
Status: COMPLETED
Start: 2021-01-01 | End: 2021-01-01

## 2021-01-01 RX ORDER — ASPIRIN 81 MG/1
81 TABLET, CHEWABLE ORAL DAILY
Status: DISCONTINUED | OUTPATIENT
Start: 2021-01-01 | End: 2021-01-01

## 2021-01-01 RX ORDER — ALBUMIN (HUMAN) 12.5 G/50ML
12.5 SOLUTION INTRAVENOUS EVERY 12 HOURS
Status: DISCONTINUED | OUTPATIENT
Start: 2021-01-01 | End: 2021-01-01

## 2021-01-01 RX ORDER — DEXTROSE MONOHYDRATE 25 G/50ML
25 INJECTION, SOLUTION INTRAVENOUS ONCE
Status: COMPLETED | OUTPATIENT
Start: 2021-01-01 | End: 2021-01-01

## 2021-01-01 RX ORDER — BUMETANIDE 0.25 MG/ML
0.5 INJECTION, SOLUTION INTRAMUSCULAR; INTRAVENOUS EVERY 12 HOURS
Status: COMPLETED | OUTPATIENT
Start: 2021-01-01 | End: 2021-01-01

## 2021-01-01 RX ORDER — POTASSIUM CHLORIDE 7.45 MG/ML
10 INJECTION INTRAVENOUS PRN
Status: DISCONTINUED | OUTPATIENT
Start: 2021-01-01 | End: 2021-01-01 | Stop reason: HOSPADM

## 2021-01-01 RX ORDER — SODIUM POLYSTYRENE SULFONATE 15 G/60ML
30 SUSPENSION ORAL; RECTAL ONCE
Status: COMPLETED | OUTPATIENT
Start: 2021-01-01 | End: 2021-01-01

## 2021-01-01 RX ORDER — 0.9 % SODIUM CHLORIDE 0.9 %
500 INTRAVENOUS SOLUTION INTRAVENOUS ONCE
Status: DISCONTINUED | OUTPATIENT
Start: 2021-01-01 | End: 2021-01-01

## 2021-01-01 RX ORDER — PANTOPRAZOLE SODIUM 40 MG/10ML
40 INJECTION, POWDER, LYOPHILIZED, FOR SOLUTION INTRAVENOUS 2 TIMES DAILY
Status: DISCONTINUED | OUTPATIENT
Start: 2021-01-01 | End: 2021-01-01

## 2021-01-01 RX ORDER — INSULIN GLARGINE 100 [IU]/ML
10 INJECTION, SOLUTION SUBCUTANEOUS NIGHTLY
Status: DISCONTINUED | OUTPATIENT
Start: 2021-01-01 | End: 2021-01-01

## 2021-01-01 RX ORDER — LORAZEPAM 2 MG/ML
0.5 INJECTION INTRAMUSCULAR EVERY 6 HOURS PRN
Status: DISCONTINUED | OUTPATIENT
Start: 2021-01-01 | End: 2021-01-01 | Stop reason: HOSPADM

## 2021-01-01 RX ORDER — NICOTINE POLACRILEX 4 MG
15 LOZENGE BUCCAL PRN
Status: DISCONTINUED | OUTPATIENT
Start: 2021-01-01 | End: 2021-01-01 | Stop reason: HOSPADM

## 2021-01-01 RX ORDER — PROPOFOL 10 MG/ML
5-50 INJECTION, EMULSION INTRAVENOUS
Status: DISCONTINUED | OUTPATIENT
Start: 2021-01-01 | End: 2021-01-01 | Stop reason: HOSPADM

## 2021-01-01 RX ORDER — FUROSEMIDE 10 MG/ML
20 INJECTION INTRAMUSCULAR; INTRAVENOUS ONCE
Status: COMPLETED | OUTPATIENT
Start: 2021-01-01 | End: 2021-01-01

## 2021-01-01 RX ORDER — ATORVASTATIN CALCIUM 10 MG/1
10 TABLET, FILM COATED ORAL DAILY
Status: DISCONTINUED | OUTPATIENT
Start: 2021-01-01 | End: 2021-01-01

## 2021-01-01 RX ORDER — FUROSEMIDE 10 MG/ML
40 INJECTION INTRAMUSCULAR; INTRAVENOUS ONCE
Status: COMPLETED | OUTPATIENT
Start: 2021-01-01 | End: 2021-01-01

## 2021-01-01 RX ORDER — ONDANSETRON 4 MG/1
4 TABLET, ORALLY DISINTEGRATING ORAL EVERY 8 HOURS PRN
Status: DISCONTINUED | OUTPATIENT
Start: 2021-01-01 | End: 2021-01-01 | Stop reason: HOSPADM

## 2021-01-01 RX ORDER — DEXAMETHASONE SODIUM PHOSPHATE 4 MG/ML
10 INJECTION, SOLUTION INTRA-ARTICULAR; INTRALESIONAL; INTRAMUSCULAR; INTRAVENOUS; SOFT TISSUE DAILY
Status: COMPLETED | OUTPATIENT
Start: 2021-01-01 | End: 2021-01-01

## 2021-01-01 RX ORDER — SODIUM POLYSTYRENE SULFONATE 15 G/60ML
30 SUSPENSION ORAL; RECTAL ONCE
Status: DISCONTINUED | OUTPATIENT
Start: 2021-01-01 | End: 2021-01-01

## 2021-01-01 RX ORDER — LORAZEPAM 2 MG/ML
0.5 INJECTION INTRAMUSCULAR ONCE
Status: COMPLETED | OUTPATIENT
Start: 2021-01-01 | End: 2021-01-01

## 2021-01-01 RX ORDER — AMLODIPINE BESYLATE 5 MG/1
5 TABLET ORAL DAILY
Status: DISCONTINUED | OUTPATIENT
Start: 2021-01-01 | End: 2021-01-01

## 2021-01-01 RX ORDER — SODIUM CHLORIDE 0.9 % (FLUSH) 0.9 %
5-40 SYRINGE (ML) INJECTION EVERY 12 HOURS SCHEDULED
Status: DISCONTINUED | OUTPATIENT
Start: 2021-01-01 | End: 2021-01-01

## 2021-01-01 RX ORDER — SODIUM CHLORIDE 9 MG/ML
INJECTION, SOLUTION INTRAVENOUS PRN
Status: DISCONTINUED | OUTPATIENT
Start: 2021-01-01 | End: 2021-01-01 | Stop reason: HOSPADM

## 2021-01-01 RX ORDER — ALBUMIN (HUMAN) 12.5 G/50ML
25 SOLUTION INTRAVENOUS EVERY 6 HOURS
Status: COMPLETED | OUTPATIENT
Start: 2021-01-01 | End: 2021-01-01

## 2021-01-01 RX ORDER — CALCIUM GLUCONATE 94 MG/ML
1000 INJECTION, SOLUTION INTRAVENOUS ONCE
Status: COMPLETED | OUTPATIENT
Start: 2021-01-01 | End: 2021-01-01

## 2021-01-01 RX ORDER — ONDANSETRON 2 MG/ML
4 INJECTION INTRAMUSCULAR; INTRAVENOUS EVERY 6 HOURS PRN
Status: DISCONTINUED | OUTPATIENT
Start: 2021-01-01 | End: 2021-01-01 | Stop reason: HOSPADM

## 2021-01-01 RX ORDER — LEVOTHYROXINE SODIUM 0.07 MG/1
75 TABLET ORAL DAILY
Status: DISCONTINUED | OUTPATIENT
Start: 2021-01-01 | End: 2021-01-01

## 2021-01-01 RX ORDER — SODIUM CHLORIDE 0.9 % (FLUSH) 0.9 %
5-40 SYRINGE (ML) INJECTION PRN
Status: DISCONTINUED | OUTPATIENT
Start: 2021-01-01 | End: 2021-01-01 | Stop reason: HOSPADM

## 2021-01-01 RX ORDER — KETAMINE HCL IN NACL, ISO-OSM 100MG/10ML
100 SYRINGE (ML) INJECTION ONCE
Status: COMPLETED | OUTPATIENT
Start: 2021-01-01 | End: 2021-01-01

## 2021-01-01 RX ORDER — LORAZEPAM 2 MG/ML
0.5 INJECTION INTRAMUSCULAR NIGHTLY
Status: DISCONTINUED | OUTPATIENT
Start: 2021-01-01 | End: 2021-01-01

## 2021-01-01 RX ORDER — LIDOCAINE HYDROCHLORIDE 10 MG/ML
5 INJECTION, SOLUTION EPIDURAL; INFILTRATION; INTRACAUDAL; PERINEURAL ONCE
Status: DISCONTINUED | OUTPATIENT
Start: 2021-01-01 | End: 2021-01-01

## 2021-01-01 RX ORDER — CHLORHEXIDINE GLUCONATE 0.12 MG/ML
15 RINSE ORAL 2 TIMES DAILY
Status: DISCONTINUED | OUTPATIENT
Start: 2021-01-01 | End: 2021-01-01

## 2021-01-01 RX ORDER — SENNA PLUS 8.6 MG/1
2 TABLET ORAL NIGHTLY
Status: DISCONTINUED | OUTPATIENT
Start: 2021-01-01 | End: 2021-01-01

## 2021-01-01 RX ORDER — INSULIN GLARGINE 100 [IU]/ML
18 INJECTION, SOLUTION SUBCUTANEOUS NIGHTLY
Status: DISCONTINUED | OUTPATIENT
Start: 2021-01-01 | End: 2021-01-01

## 2021-01-01 RX ORDER — DOCUSATE SODIUM 100 MG/1
100 CAPSULE, LIQUID FILLED ORAL 2 TIMES DAILY
Status: DISCONTINUED | OUTPATIENT
Start: 2021-01-01 | End: 2021-01-01 | Stop reason: ALTCHOICE

## 2021-01-01 RX ORDER — ACETAMINOPHEN 650 MG/1
650 SUPPOSITORY RECTAL EVERY 6 HOURS PRN
Status: DISCONTINUED | OUTPATIENT
Start: 2021-01-01 | End: 2021-01-01 | Stop reason: HOSPADM

## 2021-01-01 RX ORDER — 0.9 % SODIUM CHLORIDE 0.9 %
2000 INTRAVENOUS SOLUTION INTRAVENOUS ONCE
Status: COMPLETED | OUTPATIENT
Start: 2021-01-01 | End: 2021-01-01

## 2021-01-01 RX ORDER — METOPROLOL SUCCINATE 25 MG/1
25 TABLET, EXTENDED RELEASE ORAL DAILY
Status: DISCONTINUED | OUTPATIENT
Start: 2021-01-01 | End: 2021-01-01

## 2021-01-01 RX ORDER — DEXTROSE AND SODIUM CHLORIDE 5; .45 G/100ML; G/100ML
INJECTION, SOLUTION INTRAVENOUS CONTINUOUS
Status: ACTIVE | OUTPATIENT
Start: 2021-01-01 | End: 2021-01-01

## 2021-01-01 RX ORDER — DEXTROSE MONOHYDRATE 25 G/50ML
12.5 INJECTION, SOLUTION INTRAVENOUS PRN
Status: DISCONTINUED | OUTPATIENT
Start: 2021-01-01 | End: 2021-01-01 | Stop reason: HOSPADM

## 2021-01-01 RX ORDER — POTASSIUM CHLORIDE 29.8 MG/ML
20 INJECTION INTRAVENOUS PRN
Status: DISCONTINUED | OUTPATIENT
Start: 2021-01-01 | End: 2021-01-01 | Stop reason: HOSPADM

## 2021-01-01 RX ADMIN — DEXTROSE MONOHYDRATE 25 G: 25 INJECTION, SOLUTION INTRAVENOUS at 20:08

## 2021-01-01 RX ADMIN — MORPHINE SULFATE 2 MG: 2 INJECTION, SOLUTION INTRAMUSCULAR; INTRAVENOUS at 08:41

## 2021-01-01 RX ADMIN — DEXTROSE AND SODIUM CHLORIDE: 5; 450 INJECTION, SOLUTION INTRAVENOUS at 19:51

## 2021-01-01 RX ADMIN — LEVOTHYROXINE SODIUM 75 MCG: 0.07 TABLET ORAL at 06:25

## 2021-01-01 RX ADMIN — Medication 18 MCG/MIN: at 12:41

## 2021-01-01 RX ADMIN — POTASSIUM CHLORIDE 20 MEQ: 29.8 INJECTION, SOLUTION INTRAVENOUS at 11:24

## 2021-01-01 RX ADMIN — AMLODIPINE BESYLATE 5 MG: 5 TABLET ORAL at 09:23

## 2021-01-01 RX ADMIN — Medication 18 MCG/MIN: at 21:50

## 2021-01-01 RX ADMIN — AMLODIPINE BESYLATE 5 MG: 5 TABLET ORAL at 09:14

## 2021-01-01 RX ADMIN — PROPOFOL 50 MCG/KG/MIN: 10 INJECTION, EMULSION INTRAVENOUS at 15:29

## 2021-01-01 RX ADMIN — BARICITINIB 2 MG: 2 TABLET, FILM COATED ORAL at 22:59

## 2021-01-01 RX ADMIN — PANTOPRAZOLE SODIUM 40 MG: 40 INJECTION, POWDER, FOR SOLUTION INTRAVENOUS at 19:37

## 2021-01-01 RX ADMIN — ASPIRIN 81 MG CHEWABLE TABLET 81 MG: 81 TABLET CHEWABLE at 08:54

## 2021-01-01 RX ADMIN — LEVOTHYROXINE SODIUM 75 MCG: 0.07 TABLET ORAL at 08:58

## 2021-01-01 RX ADMIN — INSULIN LISPRO 2 UNITS: 100 INJECTION, SOLUTION INTRAVENOUS; SUBCUTANEOUS at 20:25

## 2021-01-01 RX ADMIN — INSULIN LISPRO 12 UNITS: 100 INJECTION, SOLUTION INTRAVENOUS; SUBCUTANEOUS at 09:00

## 2021-01-01 RX ADMIN — METOPROLOL SUCCINATE 50 MG: 50 TABLET, EXTENDED RELEASE ORAL at 10:00

## 2021-01-01 RX ADMIN — DOCUSATE SODIUM 100 MG: 100 CAPSULE ORAL at 20:09

## 2021-01-01 RX ADMIN — SODIUM CHLORIDE, PRESERVATIVE FREE 10 ML: 5 INJECTION INTRAVENOUS at 09:06

## 2021-01-01 RX ADMIN — AMLODIPINE BESYLATE 5 MG: 5 TABLET ORAL at 08:11

## 2021-01-01 RX ADMIN — AZITHROMYCIN MONOHYDRATE 500 MG: 500 INJECTION, POWDER, LYOPHILIZED, FOR SOLUTION INTRAVENOUS at 11:55

## 2021-01-01 RX ADMIN — PANTOPRAZOLE SODIUM 40 MG: 40 INJECTION, POWDER, FOR SOLUTION INTRAVENOUS at 21:09

## 2021-01-01 RX ADMIN — ACETAMINOPHEN 650 MG: 325 TABLET ORAL at 15:35

## 2021-01-01 RX ADMIN — DEXTROSE MONOHYDRATE: 50 INJECTION, SOLUTION INTRAVENOUS at 15:00

## 2021-01-01 RX ADMIN — TIOTROPIUM BROMIDE AND OLODATEROL 2 PUFF: 3.124; 2.736 SPRAY, METERED RESPIRATORY (INHALATION) at 08:20

## 2021-01-01 RX ADMIN — ATORVASTATIN CALCIUM 10 MG: 10 TABLET, FILM COATED ORAL at 07:59

## 2021-01-01 RX ADMIN — MEROPENEM 1000 MG: 1 INJECTION, POWDER, FOR SOLUTION INTRAVENOUS at 14:30

## 2021-01-01 RX ADMIN — BARICITINIB 2 MG: 2 TABLET, FILM COATED ORAL at 09:30

## 2021-01-01 RX ADMIN — PROPOFOL 30 MCG/KG/MIN: 10 INJECTION, EMULSION INTRAVENOUS at 11:45

## 2021-01-01 RX ADMIN — DEXAMETHASONE SODIUM PHOSPHATE 20 MG: 4 INJECTION, SOLUTION INTRAMUSCULAR; INTRAVENOUS at 08:33

## 2021-01-01 RX ADMIN — POTASSIUM CHLORIDE 20 MEQ: 29.8 INJECTION, SOLUTION INTRAVENOUS at 14:57

## 2021-01-01 RX ADMIN — INSULIN LISPRO 6 UNITS: 100 INJECTION, SOLUTION INTRAVENOUS; SUBCUTANEOUS at 09:28

## 2021-01-01 RX ADMIN — DEXTROSE MONOHYDRATE: 50 INJECTION, SOLUTION INTRAVENOUS at 03:15

## 2021-01-01 RX ADMIN — BARICITINIB 2 MG: 2 TABLET, FILM COATED ORAL at 07:59

## 2021-01-01 RX ADMIN — PROPOFOL 50 MCG/KG/MIN: 10 INJECTION, EMULSION INTRAVENOUS at 08:53

## 2021-01-01 RX ADMIN — MIDAZOLAM 3 MG/HR: 5 INJECTION, SOLUTION INTRAMUSCULAR; INTRAVENOUS at 21:28

## 2021-01-01 RX ADMIN — PROPOFOL 40 MCG/KG/MIN: 10 INJECTION, EMULSION INTRAVENOUS at 09:40

## 2021-01-01 RX ADMIN — LEVOTHYROXINE SODIUM 75 MCG: 0.07 TABLET ORAL at 06:45

## 2021-01-01 RX ADMIN — ACETAMINOPHEN 1000 MG: 500 TABLET ORAL at 14:22

## 2021-01-01 RX ADMIN — MEROPENEM 1000 MG: 1 INJECTION, POWDER, FOR SOLUTION INTRAVENOUS at 13:26

## 2021-01-01 RX ADMIN — ENOXAPARIN SODIUM 30 MG: 100 INJECTION SUBCUTANEOUS at 22:13

## 2021-01-01 RX ADMIN — MEROPENEM 1000 MG: 1 INJECTION, POWDER, FOR SOLUTION INTRAVENOUS at 14:25

## 2021-01-01 RX ADMIN — SODIUM CHLORIDE, PRESERVATIVE FREE 5 ML: 5 INJECTION INTRAVENOUS at 09:27

## 2021-01-01 RX ADMIN — INSULIN LISPRO 3 UNITS: 100 INJECTION, SOLUTION INTRAVENOUS; SUBCUTANEOUS at 20:36

## 2021-01-01 RX ADMIN — ATORVASTATIN CALCIUM 10 MG: 10 TABLET, FILM COATED ORAL at 08:44

## 2021-01-01 RX ADMIN — TIOTROPIUM BROMIDE AND OLODATEROL 2 PUFF: 3.124; 2.736 SPRAY, METERED RESPIRATORY (INHALATION) at 08:18

## 2021-01-01 RX ADMIN — ALBUMIN (HUMAN) 12.5 G: 0.25 INJECTION, SOLUTION INTRAVENOUS at 23:00

## 2021-01-01 RX ADMIN — Medication 100 MG: at 19:37

## 2021-01-01 RX ADMIN — LEVOTHYROXINE SODIUM 75 MCG: 0.07 TABLET ORAL at 09:00

## 2021-01-01 RX ADMIN — LEVOTHYROXINE SODIUM 75 MCG: 0.07 TABLET ORAL at 05:10

## 2021-01-01 RX ADMIN — INSULIN LISPRO 9 UNITS: 100 INJECTION, SOLUTION INTRAVENOUS; SUBCUTANEOUS at 13:29

## 2021-01-01 RX ADMIN — LORAZEPAM 0.5 MG: 2 INJECTION INTRAMUSCULAR; INTRAVENOUS at 22:11

## 2021-01-01 RX ADMIN — MEROPENEM 1000 MG: 1 INJECTION, POWDER, FOR SOLUTION INTRAVENOUS at 19:44

## 2021-01-01 RX ADMIN — AZITHROMYCIN MONOHYDRATE 500 MG: 500 INJECTION, POWDER, LYOPHILIZED, FOR SOLUTION INTRAVENOUS at 12:17

## 2021-01-01 RX ADMIN — ASPIRIN 81 MG: 81 TABLET, FILM COATED ORAL at 08:30

## 2021-01-01 RX ADMIN — BUMETANIDE 0.5 MG: 0.25 INJECTION INTRAMUSCULAR; INTRAVENOUS at 10:10

## 2021-01-01 RX ADMIN — PANTOPRAZOLE SODIUM 40 MG: 40 INJECTION, POWDER, FOR SOLUTION INTRAVENOUS at 11:16

## 2021-01-01 RX ADMIN — INSULIN LISPRO 8 UNITS: 100 INJECTION, SOLUTION INTRAVENOUS; SUBCUTANEOUS at 18:04

## 2021-01-01 RX ADMIN — ATORVASTATIN CALCIUM 10 MG: 10 TABLET, FILM COATED ORAL at 10:45

## 2021-01-01 RX ADMIN — DEXTROSE MONOHYDRATE: 50 INJECTION, SOLUTION INTRAVENOUS at 21:28

## 2021-01-01 RX ADMIN — PROPOFOL 30 MCG/KG/MIN: 10 INJECTION, EMULSION INTRAVENOUS at 07:46

## 2021-01-01 RX ADMIN — ENOXAPARIN SODIUM 30 MG: 100 INJECTION SUBCUTANEOUS at 07:59

## 2021-01-01 RX ADMIN — INSULIN LISPRO 6 UNITS: 100 INJECTION, SOLUTION INTRAVENOUS; SUBCUTANEOUS at 07:55

## 2021-01-01 RX ADMIN — PIPERACILLIN AND TAZOBACTAM 3375 MG: 3; .375 INJECTION, POWDER, LYOPHILIZED, FOR SOLUTION INTRAVENOUS at 12:23

## 2021-01-01 RX ADMIN — FOLIC ACID 1 MG: 1 TABLET ORAL at 08:58

## 2021-01-01 RX ADMIN — PANTOPRAZOLE SODIUM 40 MG: 40 INJECTION, POWDER, FOR SOLUTION INTRAVENOUS at 20:09

## 2021-01-01 RX ADMIN — ALBUMIN (HUMAN) 25 G: 0.25 INJECTION, SOLUTION INTRAVENOUS at 23:12

## 2021-01-01 RX ADMIN — ASPIRIN 81 MG: 81 TABLET, FILM COATED ORAL at 09:59

## 2021-01-01 RX ADMIN — MIDAZOLAM 1 MG/HR: 5 INJECTION, SOLUTION INTRAMUSCULAR; INTRAVENOUS at 14:19

## 2021-01-01 RX ADMIN — HEPARIN SODIUM 5000 UNITS: 5000 INJECTION INTRAVENOUS; SUBCUTANEOUS at 13:03

## 2021-01-01 RX ADMIN — DEXTROSE MONOHYDRATE: 50 INJECTION, SOLUTION INTRAVENOUS at 14:46

## 2021-01-01 RX ADMIN — AMLODIPINE BESYLATE 5 MG: 5 TABLET ORAL at 08:40

## 2021-01-01 RX ADMIN — LEVOTHYROXINE SODIUM 75 MCG: 0.07 TABLET ORAL at 06:24

## 2021-01-01 RX ADMIN — SENNOSIDES 17.2 MG: 8.6 TABLET ORAL at 20:31

## 2021-01-01 RX ADMIN — AZITHROMYCIN MONOHYDRATE 500 MG: 500 INJECTION, POWDER, LYOPHILIZED, FOR SOLUTION INTRAVENOUS at 12:15

## 2021-01-01 RX ADMIN — PANTOPRAZOLE SODIUM 40 MG: 40 INJECTION, POWDER, FOR SOLUTION INTRAVENOUS at 09:35

## 2021-01-01 RX ADMIN — IOPAMIDOL 80 ML: 755 INJECTION, SOLUTION INTRAVENOUS at 15:04

## 2021-01-01 RX ADMIN — ENOXAPARIN SODIUM 30 MG: 100 INJECTION SUBCUTANEOUS at 22:58

## 2021-01-01 RX ADMIN — ASPIRIN 81 MG CHEWABLE TABLET 81 MG: 81 TABLET CHEWABLE at 09:30

## 2021-01-01 RX ADMIN — Medication 100 MG: at 08:58

## 2021-01-01 RX ADMIN — PROPOFOL 30 MCG/KG/MIN: 10 INJECTION, EMULSION INTRAVENOUS at 08:44

## 2021-01-01 RX ADMIN — METOPROLOL SUCCINATE 50 MG: 50 TABLET, EXTENDED RELEASE ORAL at 10:22

## 2021-01-01 RX ADMIN — MIDAZOLAM 2 MG/HR: 5 INJECTION, SOLUTION INTRAMUSCULAR; INTRAVENOUS at 16:22

## 2021-01-01 RX ADMIN — ACETAMINOPHEN 650 MG: 650 SUPPOSITORY RECTAL at 09:30

## 2021-01-01 RX ADMIN — PROPOFOL 30 MCG/KG/MIN: 10 INJECTION, EMULSION INTRAVENOUS at 13:54

## 2021-01-01 RX ADMIN — POTASSIUM CHLORIDE 20 MEQ: 29.8 INJECTION, SOLUTION INTRAVENOUS at 10:11

## 2021-01-01 RX ADMIN — ATORVASTATIN CALCIUM 10 MG: 10 TABLET, FILM COATED ORAL at 09:05

## 2021-01-01 RX ADMIN — DOCUSATE SODIUM 100 MG: 100 CAPSULE ORAL at 08:54

## 2021-01-01 RX ADMIN — ALBUMIN (HUMAN) 25 G: 0.25 INJECTION, SOLUTION INTRAVENOUS at 12:04

## 2021-01-01 RX ADMIN — DEXAMETHASONE SODIUM PHOSPHATE 10 MG: 4 INJECTION, SOLUTION INTRAMUSCULAR; INTRAVENOUS at 08:40

## 2021-01-01 RX ADMIN — MEROPENEM 1000 MG: 1 INJECTION, POWDER, FOR SOLUTION INTRAVENOUS at 03:31

## 2021-01-01 RX ADMIN — ATORVASTATIN CALCIUM 10 MG: 10 TABLET, FILM COATED ORAL at 08:33

## 2021-01-01 RX ADMIN — INSULIN LISPRO 9 UNITS: 100 INJECTION, SOLUTION INTRAVENOUS; SUBCUTANEOUS at 09:23

## 2021-01-01 RX ADMIN — METOPROLOL SUCCINATE 50 MG: 50 TABLET, EXTENDED RELEASE ORAL at 09:22

## 2021-01-01 RX ADMIN — PROPOFOL 50 MCG/KG/MIN: 10 INJECTION, EMULSION INTRAVENOUS at 19:45

## 2021-01-01 RX ADMIN — DEXAMETHASONE SODIUM PHOSPHATE 10 MG: 4 INJECTION, SOLUTION INTRAMUSCULAR; INTRAVENOUS at 13:13

## 2021-01-01 RX ADMIN — PANTOPRAZOLE SODIUM 40 MG: 40 INJECTION, POWDER, FOR SOLUTION INTRAVENOUS at 10:21

## 2021-01-01 RX ADMIN — SODIUM CHLORIDE 25 ML: 9 INJECTION, SOLUTION INTRAVENOUS at 23:05

## 2021-01-01 RX ADMIN — MEROPENEM 1000 MG: 1 INJECTION, POWDER, FOR SOLUTION INTRAVENOUS at 20:14

## 2021-01-01 RX ADMIN — SODIUM CHLORIDE, PRESERVATIVE FREE 10 ML: 5 INJECTION INTRAVENOUS at 21:19

## 2021-01-01 RX ADMIN — INSULIN GLARGINE 10 UNITS: 100 INJECTION, SOLUTION SUBCUTANEOUS at 20:19

## 2021-01-01 RX ADMIN — SODIUM CHLORIDE, PRESERVATIVE FREE 10 ML: 5 INJECTION INTRAVENOUS at 07:59

## 2021-01-01 RX ADMIN — SODIUM CHLORIDE, PRESERVATIVE FREE 10 ML: 5 INJECTION INTRAVENOUS at 20:40

## 2021-01-01 RX ADMIN — BARICITINIB 2 MG: 2 TABLET, FILM COATED ORAL at 08:30

## 2021-01-01 RX ADMIN — BARICITINIB 2 MG: 2 TABLET, FILM COATED ORAL at 08:33

## 2021-01-01 RX ADMIN — VASOPRESSIN 0.03 UNITS/MIN: 20 INJECTION INTRAVENOUS at 07:53

## 2021-01-01 RX ADMIN — AMLODIPINE BESYLATE 5 MG: 5 TABLET ORAL at 08:33

## 2021-01-01 RX ADMIN — DEXAMETHASONE 6 MG: 4 TABLET ORAL at 07:59

## 2021-01-01 RX ADMIN — ASPIRIN 81 MG: 81 TABLET, FILM COATED ORAL at 07:59

## 2021-01-01 RX ADMIN — Medication 19 MCG/MIN: at 01:10

## 2021-01-01 RX ADMIN — SODIUM CHLORIDE, PRESERVATIVE FREE 10 ML: 5 INJECTION INTRAVENOUS at 09:00

## 2021-01-01 RX ADMIN — PIPERACILLIN AND TAZOBACTAM 3375 MG: 3; .375 INJECTION, POWDER, LYOPHILIZED, FOR SOLUTION INTRAVENOUS at 11:31

## 2021-01-01 RX ADMIN — FLUCONAZOLE IN SODIUM CHLORIDE 200 MG: 2 INJECTION, SOLUTION INTRAVENOUS at 16:09

## 2021-01-01 RX ADMIN — CHLORHEXIDINE GLUCONATE 0.12% ORAL RINSE 15 ML: 1.2 LIQUID ORAL at 21:00

## 2021-01-01 RX ADMIN — ATORVASTATIN CALCIUM 10 MG: 10 TABLET, FILM COATED ORAL at 09:24

## 2021-01-01 RX ADMIN — PROPOFOL 9.9 MCG/KG/MIN: 10 INJECTION, EMULSION INTRAVENOUS at 14:59

## 2021-01-01 RX ADMIN — LORAZEPAM 0.5 MG: 2 INJECTION INTRAMUSCULAR; INTRAVENOUS at 04:55

## 2021-01-01 RX ADMIN — ATORVASTATIN CALCIUM 10 MG: 10 TABLET, FILM COATED ORAL at 08:12

## 2021-01-01 RX ADMIN — ALBUMIN (HUMAN) 12.5 G: 0.25 INJECTION, SOLUTION INTRAVENOUS at 23:30

## 2021-01-01 RX ADMIN — Medication 100 MG: at 08:01

## 2021-01-01 RX ADMIN — LEVOTHYROXINE SODIUM 75 MCG: 0.07 TABLET ORAL at 06:02

## 2021-01-01 RX ADMIN — Medication 12 MCG/MIN: at 19:35

## 2021-01-01 RX ADMIN — DEXAMETHASONE SODIUM PHOSPHATE 20 MG: 4 INJECTION, SOLUTION INTRAMUSCULAR; INTRAVENOUS at 09:46

## 2021-01-01 RX ADMIN — INSULIN LISPRO 3 UNITS: 100 INJECTION, SOLUTION INTRAVENOUS; SUBCUTANEOUS at 20:42

## 2021-01-01 RX ADMIN — SODIUM CHLORIDE, PRESERVATIVE FREE 10 ML: 5 INJECTION INTRAVENOUS at 20:18

## 2021-01-01 RX ADMIN — SODIUM CHLORIDE, PRESERVATIVE FREE 10 ML: 5 INJECTION INTRAVENOUS at 20:30

## 2021-01-01 RX ADMIN — PROPOFOL 30 MCG/KG/MIN: 10 INJECTION, EMULSION INTRAVENOUS at 00:00

## 2021-01-01 RX ADMIN — TIOTROPIUM BROMIDE AND OLODATEROL 2 PUFF: 3.124; 2.736 SPRAY, METERED RESPIRATORY (INHALATION) at 10:38

## 2021-01-01 RX ADMIN — INSULIN GLARGINE 18 UNITS: 100 INJECTION, SOLUTION SUBCUTANEOUS at 20:37

## 2021-01-01 RX ADMIN — PROPOFOL 20 MCG/KG/MIN: 10 INJECTION, EMULSION INTRAVENOUS at 16:30

## 2021-01-01 RX ADMIN — METOPROLOL SUCCINATE 50 MG: 50 TABLET, EXTENDED RELEASE ORAL at 08:12

## 2021-01-01 RX ADMIN — PROPOFOL 30 MCG/KG/MIN: 10 INJECTION, EMULSION INTRAVENOUS at 23:09

## 2021-01-01 RX ADMIN — SODIUM BICARBONATE 50 MEQ: 84 INJECTION INTRAVENOUS at 20:08

## 2021-01-01 RX ADMIN — METOPROLOL SUCCINATE 50 MG: 50 TABLET, EXTENDED RELEASE ORAL at 08:33

## 2021-01-01 RX ADMIN — DEXAMETHASONE SODIUM PHOSPHATE 20 MG: 4 INJECTION, SOLUTION INTRAMUSCULAR; INTRAVENOUS at 09:14

## 2021-01-01 RX ADMIN — Medication 100 MG: at 11:29

## 2021-01-01 RX ADMIN — FOLIC ACID 1 MG: 1 TABLET ORAL at 08:44

## 2021-01-01 RX ADMIN — INSULIN LISPRO 4 UNITS: 100 INJECTION, SOLUTION INTRAVENOUS; SUBCUTANEOUS at 09:48

## 2021-01-01 RX ADMIN — INSULIN LISPRO 9 UNITS: 100 INJECTION, SOLUTION INTRAVENOUS; SUBCUTANEOUS at 14:23

## 2021-01-01 RX ADMIN — AMLODIPINE BESYLATE 5 MG: 5 TABLET ORAL at 09:22

## 2021-01-01 RX ADMIN — INSULIN LISPRO 6 UNITS: 100 INJECTION, SOLUTION INTRAVENOUS; SUBCUTANEOUS at 12:35

## 2021-01-01 RX ADMIN — VASOPRESSIN 0.03 UNITS/MIN: 20 INJECTION INTRAVENOUS at 23:21

## 2021-01-01 RX ADMIN — PROPOFOL 30 MCG/KG/MIN: 10 INJECTION, EMULSION INTRAVENOUS at 06:24

## 2021-01-01 RX ADMIN — MEROPENEM 500 MG: 500 INJECTION, POWDER, FOR SOLUTION INTRAVENOUS at 11:14

## 2021-01-01 RX ADMIN — LORAZEPAM 0.5 MG: 2 INJECTION INTRAMUSCULAR; INTRAVENOUS at 09:34

## 2021-01-01 RX ADMIN — PROPOFOL 30 MCG/KG/MIN: 10 INJECTION, EMULSION INTRAVENOUS at 04:38

## 2021-01-01 RX ADMIN — ONDANSETRON 4 MG: 2 INJECTION INTRAMUSCULAR; INTRAVENOUS at 12:48

## 2021-01-01 RX ADMIN — LORAZEPAM 0.5 MG: 2 INJECTION INTRAMUSCULAR; INTRAVENOUS at 21:36

## 2021-01-01 RX ADMIN — SENNOSIDES 17.2 MG: 8.6 TABLET ORAL at 21:09

## 2021-01-01 RX ADMIN — MIDAZOLAM 4 MG/HR: 5 INJECTION, SOLUTION INTRAMUSCULAR; INTRAVENOUS at 23:16

## 2021-01-01 RX ADMIN — FOLIC ACID 1 MG: 1 TABLET ORAL at 10:22

## 2021-01-01 RX ADMIN — MIDAZOLAM 4 MG/HR: 5 INJECTION, SOLUTION INTRAMUSCULAR; INTRAVENOUS at 11:11

## 2021-01-01 RX ADMIN — MIDAZOLAM 6 MG/HR: 5 INJECTION, SOLUTION INTRAMUSCULAR; INTRAVENOUS at 18:17

## 2021-01-01 RX ADMIN — ALBUMIN (HUMAN) 12.5 G: 0.25 INJECTION, SOLUTION INTRAVENOUS at 10:45

## 2021-01-01 RX ADMIN — MEROPENEM 1000 MG: 1 INJECTION, POWDER, FOR SOLUTION INTRAVENOUS at 04:10

## 2021-01-01 RX ADMIN — INSULIN GLARGINE 25 UNITS: 100 INJECTION, SOLUTION SUBCUTANEOUS at 21:05

## 2021-01-01 RX ADMIN — FLUCONAZOLE 400 MG: 400 INJECTION, SOLUTION INTRAVENOUS at 14:08

## 2021-01-01 RX ADMIN — LORAZEPAM 0.5 MG: 2 INJECTION INTRAMUSCULAR; INTRAVENOUS at 16:15

## 2021-01-01 RX ADMIN — SODIUM CHLORIDE, PRESERVATIVE FREE 40 ML: 5 INJECTION INTRAVENOUS at 22:00

## 2021-01-01 RX ADMIN — HEPARIN SODIUM 5000 UNITS: 5000 INJECTION INTRAVENOUS; SUBCUTANEOUS at 21:28

## 2021-01-01 RX ADMIN — ASPIRIN 81 MG: 81 TABLET, FILM COATED ORAL at 09:24

## 2021-01-01 RX ADMIN — BARICITINIB 2 MG: 2 TABLET, FILM COATED ORAL at 09:22

## 2021-01-01 RX ADMIN — TIOTROPIUM BROMIDE AND OLODATEROL 2 PUFF: 3.124; 2.736 SPRAY, METERED RESPIRATORY (INHALATION) at 13:30

## 2021-01-01 RX ADMIN — PROPOFOL 40 MCG/KG/MIN: 10 INJECTION, EMULSION INTRAVENOUS at 18:46

## 2021-01-01 RX ADMIN — LORAZEPAM 0.5 MG: 2 INJECTION INTRAMUSCULAR; INTRAVENOUS at 21:56

## 2021-01-01 RX ADMIN — CHLORHEXIDINE GLUCONATE 0.12% ORAL RINSE 15 ML: 1.2 LIQUID ORAL at 15:46

## 2021-01-01 RX ADMIN — ATORVASTATIN CALCIUM 10 MG: 10 TABLET, FILM COATED ORAL at 10:21

## 2021-01-01 RX ADMIN — PANTOPRAZOLE SODIUM 40 MG: 40 INJECTION, POWDER, FOR SOLUTION INTRAVENOUS at 08:59

## 2021-01-01 RX ADMIN — FUROSEMIDE 20 MG: 10 INJECTION, SOLUTION INTRAMUSCULAR; INTRAVENOUS at 02:13

## 2021-01-01 RX ADMIN — POLYETHYLENE GLYCOL 3350 17 G: 17 POWDER, FOR SOLUTION ORAL at 09:30

## 2021-01-01 RX ADMIN — SODIUM CHLORIDE, PRESERVATIVE FREE 10 ML: 5 INJECTION INTRAVENOUS at 09:04

## 2021-01-01 RX ADMIN — ATORVASTATIN CALCIUM 10 MG: 10 TABLET, FILM COATED ORAL at 08:40

## 2021-01-01 RX ADMIN — MEROPENEM 1000 MG: 1 INJECTION, POWDER, FOR SOLUTION INTRAVENOUS at 05:35

## 2021-01-01 RX ADMIN — ONDANSETRON 4 MG: 4 TABLET, ORALLY DISINTEGRATING ORAL at 23:20

## 2021-01-01 RX ADMIN — INSULIN GLARGINE 10 UNITS: 100 INJECTION, SOLUTION SUBCUTANEOUS at 20:23

## 2021-01-01 RX ADMIN — INSULIN GLARGINE 18 UNITS: 100 INJECTION, SOLUTION SUBCUTANEOUS at 20:25

## 2021-01-01 RX ADMIN — ENOXAPARIN SODIUM 30 MG: 100 INJECTION SUBCUTANEOUS at 09:30

## 2021-01-01 RX ADMIN — ATORVASTATIN CALCIUM 10 MG: 10 TABLET, FILM COATED ORAL at 10:00

## 2021-01-01 RX ADMIN — SENNOSIDES 17.2 MG: 8.6 TABLET ORAL at 19:37

## 2021-01-01 RX ADMIN — ALBUMIN (HUMAN) 12.5 G: 0.25 INJECTION, SOLUTION INTRAVENOUS at 09:59

## 2021-01-01 RX ADMIN — MEROPENEM 1000 MG: 1 INJECTION, POWDER, FOR SOLUTION INTRAVENOUS at 04:14

## 2021-01-01 RX ADMIN — ASPIRIN 81 MG CHEWABLE TABLET 81 MG: 81 TABLET CHEWABLE at 08:24

## 2021-01-01 RX ADMIN — INSULIN LISPRO 12 UNITS: 100 INJECTION, SOLUTION INTRAVENOUS; SUBCUTANEOUS at 17:35

## 2021-01-01 RX ADMIN — DEXAMETHASONE SODIUM PHOSPHATE 20 MG: 4 INJECTION, SOLUTION INTRAMUSCULAR; INTRAVENOUS at 09:25

## 2021-01-01 RX ADMIN — MIDAZOLAM 5 MG/HR: 5 INJECTION, SOLUTION INTRAMUSCULAR; INTRAVENOUS at 23:30

## 2021-01-01 RX ADMIN — BUMETANIDE 0.5 MG: 0.25 INJECTION INTRAMUSCULAR; INTRAVENOUS at 10:48

## 2021-01-01 RX ADMIN — BUMETANIDE 0.5 MG: 0.25 INJECTION INTRAMUSCULAR; INTRAVENOUS at 00:31

## 2021-01-01 RX ADMIN — LORAZEPAM 0.5 MG: 2 INJECTION INTRAMUSCULAR; INTRAVENOUS at 11:41

## 2021-01-01 RX ADMIN — INSULIN LISPRO 6 UNITS: 100 INJECTION, SOLUTION INTRAVENOUS; SUBCUTANEOUS at 17:22

## 2021-01-01 RX ADMIN — INSULIN LISPRO 5 UNITS: 100 INJECTION, SOLUTION INTRAVENOUS; SUBCUTANEOUS at 21:13

## 2021-01-01 RX ADMIN — CHLORHEXIDINE GLUCONATE 0.12% ORAL RINSE 15 ML: 1.2 LIQUID ORAL at 22:00

## 2021-01-01 RX ADMIN — INSULIN LISPRO 6 UNITS: 100 INJECTION, SOLUTION INTRAVENOUS; SUBCUTANEOUS at 23:36

## 2021-01-01 RX ADMIN — DEXTROSE MONOHYDRATE: 50 INJECTION, SOLUTION INTRAVENOUS at 13:12

## 2021-01-01 RX ADMIN — INSULIN LISPRO 3 UNITS: 100 INJECTION, SOLUTION INTRAVENOUS; SUBCUTANEOUS at 18:40

## 2021-01-01 RX ADMIN — SODIUM CHLORIDE, PRESERVATIVE FREE 10 ML: 5 INJECTION INTRAVENOUS at 20:09

## 2021-01-01 RX ADMIN — DEXAMETHASONE SODIUM PHOSPHATE 10 MG: 4 INJECTION, SOLUTION INTRAMUSCULAR; INTRAVENOUS at 10:00

## 2021-01-01 RX ADMIN — ENOXAPARIN SODIUM 30 MG: 100 INJECTION SUBCUTANEOUS at 09:35

## 2021-01-01 RX ADMIN — PANTOPRAZOLE SODIUM 40 MG: 40 INJECTION, POWDER, FOR SOLUTION INTRAVENOUS at 08:01

## 2021-01-01 RX ADMIN — INSULIN LISPRO 9 UNITS: 100 INJECTION, SOLUTION INTRAVENOUS; SUBCUTANEOUS at 12:47

## 2021-01-01 RX ADMIN — SODIUM CHLORIDE, PRESERVATIVE FREE 10 ML: 5 INJECTION INTRAVENOUS at 20:24

## 2021-01-01 RX ADMIN — DEXTROSE MONOHYDRATE: 50 INJECTION, SOLUTION INTRAVENOUS at 14:45

## 2021-01-01 RX ADMIN — AMLODIPINE BESYLATE 5 MG: 5 TABLET ORAL at 13:23

## 2021-01-01 RX ADMIN — MORPHINE SULFATE 2 MG: 2 INJECTION, SOLUTION INTRAMUSCULAR; INTRAVENOUS at 05:46

## 2021-01-01 RX ADMIN — SENNOSIDES 17.2 MG: 8.6 TABLET ORAL at 20:05

## 2021-01-01 RX ADMIN — METOPROLOL SUCCINATE 50 MG: 50 TABLET, EXTENDED RELEASE ORAL at 08:40

## 2021-01-01 RX ADMIN — CALCIUM GLUCONATE 1000 MG: 98 INJECTION, SOLUTION INTRAVENOUS at 20:09

## 2021-01-01 RX ADMIN — INSULIN LISPRO 3 UNITS: 100 INJECTION, SOLUTION INTRAVENOUS; SUBCUTANEOUS at 20:38

## 2021-01-01 RX ADMIN — FOLIC ACID 1 MG: 1 TABLET ORAL at 10:00

## 2021-01-01 RX ADMIN — INSULIN LISPRO 12 UNITS: 100 INJECTION, SOLUTION INTRAVENOUS; SUBCUTANEOUS at 11:42

## 2021-01-01 RX ADMIN — ENOXAPARIN SODIUM 30 MG: 100 INJECTION SUBCUTANEOUS at 20:24

## 2021-01-01 RX ADMIN — INSULIN LISPRO 15 UNITS: 100 INJECTION, SOLUTION INTRAVENOUS; SUBCUTANEOUS at 08:25

## 2021-01-01 RX ADMIN — AMLODIPINE BESYLATE 5 MG: 5 TABLET ORAL at 08:30

## 2021-01-01 RX ADMIN — ALBUMIN (HUMAN) 25 G: 0.25 INJECTION, SOLUTION INTRAVENOUS at 17:18

## 2021-01-01 RX ADMIN — INSULIN LISPRO 2 UNITS: 100 INJECTION, SOLUTION INTRAVENOUS; SUBCUTANEOUS at 17:37

## 2021-01-01 RX ADMIN — PIPERACILLIN AND TAZOBACTAM 3375 MG: 3; .375 INJECTION, POWDER, LYOPHILIZED, FOR SOLUTION INTRAVENOUS at 00:06

## 2021-01-01 RX ADMIN — ENOXAPARIN SODIUM 30 MG: 100 INJECTION SUBCUTANEOUS at 07:45

## 2021-01-01 RX ADMIN — SODIUM CHLORIDE, PRESERVATIVE FREE 10 ML: 5 INJECTION INTRAVENOUS at 08:33

## 2021-01-01 RX ADMIN — Medication 30 MCG/MIN: at 23:29

## 2021-01-01 RX ADMIN — INSULIN LISPRO 6 UNITS: 100 INJECTION, SOLUTION INTRAVENOUS; SUBCUTANEOUS at 13:24

## 2021-01-01 RX ADMIN — Medication 5 MCG/MIN: at 09:03

## 2021-01-01 RX ADMIN — LEVOTHYROXINE SODIUM 75 MCG: 0.07 TABLET ORAL at 09:30

## 2021-01-01 RX ADMIN — INSULIN GLARGINE 21 UNITS: 100 INJECTION, SOLUTION SUBCUTANEOUS at 20:43

## 2021-01-01 RX ADMIN — INSULIN GLARGINE 18 UNITS: 100 INJECTION, SOLUTION SUBCUTANEOUS at 23:02

## 2021-01-01 RX ADMIN — BUMETANIDE 0.5 MG: 0.25 INJECTION INTRAMUSCULAR; INTRAVENOUS at 00:00

## 2021-01-01 RX ADMIN — FOLIC ACID 1 MG: 1 TABLET ORAL at 10:45

## 2021-01-01 RX ADMIN — SODIUM CHLORIDE, PRESERVATIVE FREE 10 ML: 5 INJECTION INTRAVENOUS at 20:15

## 2021-01-01 RX ADMIN — MIDAZOLAM 4 MG/HR: 5 INJECTION, SOLUTION INTRAMUSCULAR; INTRAVENOUS at 22:39

## 2021-01-01 RX ADMIN — SODIUM CHLORIDE, PRESERVATIVE FREE 10 ML: 5 INJECTION INTRAVENOUS at 19:37

## 2021-01-01 RX ADMIN — SODIUM CHLORIDE, PRESERVATIVE FREE 5 ML: 5 INJECTION INTRAVENOUS at 20:24

## 2021-01-01 RX ADMIN — PANTOPRAZOLE SODIUM 40 MG: 40 INJECTION, POWDER, FOR SOLUTION INTRAVENOUS at 09:16

## 2021-01-01 RX ADMIN — SODIUM CHLORIDE, PRESERVATIVE FREE 10 ML: 5 INJECTION INTRAVENOUS at 09:14

## 2021-01-01 RX ADMIN — PANTOPRAZOLE SODIUM 40 MG: 40 INJECTION, POWDER, FOR SOLUTION INTRAVENOUS at 09:03

## 2021-01-01 RX ADMIN — ATORVASTATIN CALCIUM 10 MG: 10 TABLET, FILM COATED ORAL at 09:30

## 2021-01-01 RX ADMIN — FLUCONAZOLE IN SODIUM CHLORIDE 200 MG: 2 INJECTION, SOLUTION INTRAVENOUS at 16:24

## 2021-01-01 RX ADMIN — SODIUM CHLORIDE, PRESERVATIVE FREE 10 ML: 5 INJECTION INTRAVENOUS at 20:25

## 2021-01-01 RX ADMIN — Medication 5 MCG/MIN: at 09:45

## 2021-01-01 RX ADMIN — Medication 20 MCG/MIN: at 21:00

## 2021-01-01 RX ADMIN — SODIUM CHLORIDE, PRESERVATIVE FREE 10 ML: 5 INJECTION INTRAVENOUS at 09:36

## 2021-01-01 RX ADMIN — BARICITINIB 2 MG: 2 TABLET, FILM COATED ORAL at 08:40

## 2021-01-01 RX ADMIN — INSULIN LISPRO 4 UNITS: 100 INJECTION, SOLUTION INTRAVENOUS; SUBCUTANEOUS at 13:20

## 2021-01-01 RX ADMIN — ENOXAPARIN SODIUM 30 MG: 100 INJECTION SUBCUTANEOUS at 08:40

## 2021-01-01 RX ADMIN — ASPIRIN 81 MG: 81 TABLET, FILM COATED ORAL at 07:45

## 2021-01-01 RX ADMIN — SODIUM CHLORIDE, PRESERVATIVE FREE 10 ML: 5 INJECTION INTRAVENOUS at 09:33

## 2021-01-01 RX ADMIN — DEXAMETHASONE SODIUM PHOSPHATE 20 MG: 4 INJECTION, SOLUTION INTRAMUSCULAR; INTRAVENOUS at 08:30

## 2021-01-01 RX ADMIN — PIPERACILLIN AND TAZOBACTAM 3375 MG: 3; .375 INJECTION, POWDER, LYOPHILIZED, FOR SOLUTION INTRAVENOUS at 23:07

## 2021-01-01 RX ADMIN — PROPOFOL 40 MCG/KG/MIN: 10 INJECTION, EMULSION INTRAVENOUS at 02:02

## 2021-01-01 RX ADMIN — ATORVASTATIN CALCIUM 10 MG: 10 TABLET, FILM COATED ORAL at 08:58

## 2021-01-01 RX ADMIN — INSULIN LISPRO 5 UNITS: 100 INJECTION, SOLUTION INTRAVENOUS; SUBCUTANEOUS at 21:22

## 2021-01-01 RX ADMIN — ATORVASTATIN CALCIUM 10 MG: 10 TABLET, FILM COATED ORAL at 09:36

## 2021-01-01 RX ADMIN — POLYETHYLENE GLYCOL 3350 17 G: 17 POWDER, FOR SOLUTION ORAL at 08:44

## 2021-01-01 RX ADMIN — SODIUM CHLORIDE, PRESERVATIVE FREE 10 ML: 5 INJECTION INTRAVENOUS at 08:01

## 2021-01-01 RX ADMIN — VASOPRESSIN 0.03 UNITS/MIN: 20 INJECTION INTRAVENOUS at 00:03

## 2021-01-01 RX ADMIN — PROPOFOL 25 MCG/KG/MIN: 10 INJECTION, EMULSION INTRAVENOUS at 18:17

## 2021-01-01 RX ADMIN — PIPERACILLIN AND TAZOBACTAM 3375 MG: 3; .375 INJECTION, POWDER, LYOPHILIZED, FOR SOLUTION INTRAVENOUS at 11:26

## 2021-01-01 RX ADMIN — SENNOSIDES 17.2 MG: 8.6 TABLET ORAL at 20:15

## 2021-01-01 RX ADMIN — ASPIRIN 81 MG: 81 TABLET, FILM COATED ORAL at 09:14

## 2021-01-01 RX ADMIN — INSULIN LISPRO 3 UNITS: 100 INJECTION, SOLUTION INTRAVENOUS; SUBCUTANEOUS at 22:04

## 2021-01-01 RX ADMIN — FOLIC ACID 1 MG: 1 TABLET ORAL at 15:06

## 2021-01-01 RX ADMIN — PROPOFOL 20 MCG/KG/MIN: 10 INJECTION, EMULSION INTRAVENOUS at 06:10

## 2021-01-01 RX ADMIN — ENOXAPARIN SODIUM 30 MG: 100 INJECTION SUBCUTANEOUS at 08:30

## 2021-01-01 RX ADMIN — PROPOFOL 40 MCG/KG/MIN: 10 INJECTION, EMULSION INTRAVENOUS at 13:04

## 2021-01-01 RX ADMIN — INSULIN LISPRO 4 UNITS: 100 INJECTION, SOLUTION INTRAVENOUS; SUBCUTANEOUS at 13:19

## 2021-01-01 RX ADMIN — INSULIN LISPRO 9 UNITS: 100 INJECTION, SOLUTION INTRAVENOUS; SUBCUTANEOUS at 09:00

## 2021-01-01 RX ADMIN — PROPOFOL 20.03 MCG/KG/MIN: 10 INJECTION, EMULSION INTRAVENOUS at 10:39

## 2021-01-01 RX ADMIN — ENOXAPARIN SODIUM 30 MG: 100 INJECTION SUBCUTANEOUS at 21:23

## 2021-01-01 RX ADMIN — MEROPENEM 1000 MG: 1 INJECTION, POWDER, FOR SOLUTION INTRAVENOUS at 13:52

## 2021-01-01 RX ADMIN — LEVOTHYROXINE SODIUM 75 MCG: 0.07 TABLET ORAL at 05:48

## 2021-01-01 RX ADMIN — PIPERACILLIN AND TAZOBACTAM 3375 MG: 3; .375 INJECTION, POWDER, LYOPHILIZED, FOR SOLUTION INTRAVENOUS at 12:39

## 2021-01-01 RX ADMIN — INSULIN GLARGINE 18 UNITS: 100 INJECTION, SOLUTION SUBCUTANEOUS at 22:05

## 2021-01-01 RX ADMIN — ENOXAPARIN SODIUM 30 MG: 100 INJECTION SUBCUTANEOUS at 20:20

## 2021-01-01 RX ADMIN — ENOXAPARIN SODIUM 30 MG: 100 INJECTION SUBCUTANEOUS at 09:22

## 2021-01-01 RX ADMIN — MEROPENEM 1000 MG: 1 INJECTION, POWDER, FOR SOLUTION INTRAVENOUS at 21:56

## 2021-01-01 RX ADMIN — ASPIRIN 81 MG: 81 TABLET, FILM COATED ORAL at 09:36

## 2021-01-01 RX ADMIN — INSULIN LISPRO 6 UNITS: 100 INJECTION, SOLUTION INTRAVENOUS; SUBCUTANEOUS at 12:56

## 2021-01-01 RX ADMIN — MIDAZOLAM 4 MG/HR: 5 INJECTION, SOLUTION INTRAMUSCULAR; INTRAVENOUS at 00:01

## 2021-01-01 RX ADMIN — SODIUM CHLORIDE, PRESERVATIVE FREE 10 ML: 5 INJECTION INTRAVENOUS at 23:19

## 2021-01-01 RX ADMIN — ATORVASTATIN CALCIUM 10 MG: 10 TABLET, FILM COATED ORAL at 09:59

## 2021-01-01 RX ADMIN — INSULIN LISPRO 3 UNITS: 100 INJECTION, SOLUTION INTRAVENOUS; SUBCUTANEOUS at 23:30

## 2021-01-01 RX ADMIN — PANTOPRAZOLE SODIUM 40 MG: 40 INJECTION, POWDER, FOR SOLUTION INTRAVENOUS at 20:15

## 2021-01-01 RX ADMIN — METOPROLOL SUCCINATE 50 MG: 50 TABLET, EXTENDED RELEASE ORAL at 08:30

## 2021-01-01 RX ADMIN — SODIUM CHLORIDE, PRESERVATIVE FREE 10 ML: 5 INJECTION INTRAVENOUS at 21:23

## 2021-01-01 RX ADMIN — ENOXAPARIN SODIUM 30 MG: 100 INJECTION SUBCUTANEOUS at 20:14

## 2021-01-01 RX ADMIN — SODIUM CHLORIDE, PRESERVATIVE FREE 10 ML: 5 INJECTION INTRAVENOUS at 20:36

## 2021-01-01 RX ADMIN — INSULIN GLARGINE 25 UNITS: 100 INJECTION, SOLUTION SUBCUTANEOUS at 21:13

## 2021-01-01 RX ADMIN — ENOXAPARIN SODIUM 30 MG: 100 INJECTION SUBCUTANEOUS at 09:25

## 2021-01-01 RX ADMIN — PROPOFOL 35 MCG/KG/MIN: 10 INJECTION, EMULSION INTRAVENOUS at 09:05

## 2021-01-01 RX ADMIN — CHLORHEXIDINE GLUCONATE 0.12% ORAL RINSE 15 ML: 1.2 LIQUID ORAL at 10:21

## 2021-01-01 RX ADMIN — ENOXAPARIN SODIUM 30 MG: 100 INJECTION SUBCUTANEOUS at 09:14

## 2021-01-01 RX ADMIN — METOPROLOL SUCCINATE 50 MG: 50 TABLET, EXTENDED RELEASE ORAL at 09:59

## 2021-01-01 RX ADMIN — LEVOTHYROXINE SODIUM 75 MCG: 0.07 TABLET ORAL at 04:14

## 2021-01-01 RX ADMIN — LEVOTHYROXINE SODIUM 75 MCG: 0.07 TABLET ORAL at 06:33

## 2021-01-01 RX ADMIN — INSULIN LISPRO 8 UNITS: 100 INJECTION, SOLUTION INTRAVENOUS; SUBCUTANEOUS at 18:00

## 2021-01-01 RX ADMIN — DEXAMETHASONE SODIUM PHOSPHATE 10 MG: 4 INJECTION, SOLUTION INTRAMUSCULAR; INTRAVENOUS at 09:00

## 2021-01-01 RX ADMIN — PIPERACILLIN AND TAZOBACTAM 3375 MG: 3; .375 INJECTION, POWDER, LYOPHILIZED, FOR SOLUTION INTRAVENOUS at 01:19

## 2021-01-01 RX ADMIN — BARICITINIB 2 MG: 2 TABLET, FILM COATED ORAL at 09:35

## 2021-01-01 RX ADMIN — INSULIN GLARGINE 21 UNITS: 100 INJECTION, SOLUTION SUBCUTANEOUS at 21:23

## 2021-01-01 RX ADMIN — INSULIN GLARGINE 25 UNITS: 100 INJECTION, SOLUTION SUBCUTANEOUS at 00:00

## 2021-01-01 RX ADMIN — ENOXAPARIN SODIUM 30 MG: 100 INJECTION SUBCUTANEOUS at 20:26

## 2021-01-01 RX ADMIN — INSULIN LISPRO 6 UNITS: 100 INJECTION, SOLUTION INTRAVENOUS; SUBCUTANEOUS at 13:00

## 2021-01-01 RX ADMIN — MIDAZOLAM 6 MG/HR: 5 INJECTION, SOLUTION INTRAMUSCULAR; INTRAVENOUS at 10:17

## 2021-01-01 RX ADMIN — PROPOFOL 50 MCG/KG/MIN: 10 INJECTION, EMULSION INTRAVENOUS at 17:00

## 2021-01-01 RX ADMIN — SODIUM CHLORIDE, PRESERVATIVE FREE 10 ML: 5 INJECTION INTRAVENOUS at 09:35

## 2021-01-01 RX ADMIN — INSULIN LISPRO 2 UNITS: 100 INJECTION, SOLUTION INTRAVENOUS; SUBCUTANEOUS at 20:20

## 2021-01-01 RX ADMIN — ATORVASTATIN CALCIUM 10 MG: 10 TABLET, FILM COATED ORAL at 08:30

## 2021-01-01 RX ADMIN — SODIUM CHLORIDE 2000 ML: 9 INJECTION, SOLUTION INTRAVENOUS at 10:15

## 2021-01-01 RX ADMIN — SODIUM CHLORIDE, PRESERVATIVE FREE 10 ML: 5 INJECTION INTRAVENOUS at 08:20

## 2021-01-01 RX ADMIN — SODIUM CHLORIDE, PRESERVATIVE FREE 10 ML: 5 INJECTION INTRAVENOUS at 08:44

## 2021-01-01 RX ADMIN — TIOTROPIUM BROMIDE AND OLODATEROL 2 PUFF: 3.124; 2.736 SPRAY, METERED RESPIRATORY (INHALATION) at 08:52

## 2021-01-01 RX ADMIN — INSULIN LISPRO 12 UNITS: 100 INJECTION, SOLUTION INTRAVENOUS; SUBCUTANEOUS at 17:44

## 2021-01-01 RX ADMIN — ACETAMINOPHEN 650 MG: 325 TABLET ORAL at 20:15

## 2021-01-01 RX ADMIN — BARICITINIB 2 MG: 2 TABLET, FILM COATED ORAL at 07:45

## 2021-01-01 RX ADMIN — PROPOFOL 30 MCG/KG/MIN: 10 INJECTION, EMULSION INTRAVENOUS at 02:30

## 2021-01-01 RX ADMIN — DOCUSATE SODIUM 100 MG: 100 CAPSULE ORAL at 20:31

## 2021-01-01 RX ADMIN — ENOXAPARIN SODIUM 30 MG: 100 INJECTION SUBCUTANEOUS at 21:56

## 2021-01-01 RX ADMIN — AMLODIPINE BESYLATE 5 MG: 5 TABLET ORAL at 07:59

## 2021-01-01 RX ADMIN — ASPIRIN 81 MG: 81 TABLET, FILM COATED ORAL at 08:40

## 2021-01-01 RX ADMIN — INSULIN LISPRO 3 UNITS: 100 INJECTION, SOLUTION INTRAVENOUS; SUBCUTANEOUS at 20:24

## 2021-01-01 RX ADMIN — VASOPRESSIN 0.03 UNITS/MIN: 20 INJECTION INTRAVENOUS at 21:50

## 2021-01-01 RX ADMIN — ATORVASTATIN CALCIUM 10 MG: 10 TABLET, FILM COATED ORAL at 09:14

## 2021-01-01 RX ADMIN — BARICITINIB 2 MG: 2 TABLET, FILM COATED ORAL at 09:14

## 2021-01-01 RX ADMIN — INSULIN LISPRO 3 UNITS: 100 INJECTION, SOLUTION INTRAVENOUS; SUBCUTANEOUS at 09:00

## 2021-01-01 RX ADMIN — PROPOFOL 30 MCG/KG/MIN: 10 INJECTION, EMULSION INTRAVENOUS at 16:41

## 2021-01-01 RX ADMIN — MEROPENEM 1000 MG: 1 INJECTION, POWDER, FOR SOLUTION INTRAVENOUS at 21:22

## 2021-01-01 RX ADMIN — ATORVASTATIN CALCIUM 10 MG: 10 TABLET, FILM COATED ORAL at 08:54

## 2021-01-01 RX ADMIN — INSULIN LISPRO 8 UNITS: 100 INJECTION, SOLUTION INTRAVENOUS; SUBCUTANEOUS at 17:18

## 2021-01-01 RX ADMIN — SENNOSIDES 17.2 MG: 8.6 TABLET ORAL at 21:11

## 2021-01-01 RX ADMIN — DEXAMETHASONE SODIUM PHOSPHATE 10 MG: 4 INJECTION, SOLUTION INTRAMUSCULAR; INTRAVENOUS at 09:39

## 2021-01-01 RX ADMIN — LEVOTHYROXINE SODIUM 75 MCG: 0.07 TABLET ORAL at 05:33

## 2021-01-01 RX ADMIN — AZITHROMYCIN MONOHYDRATE 500 MG: 500 INJECTION, POWDER, LYOPHILIZED, FOR SOLUTION INTRAVENOUS at 12:35

## 2021-01-01 RX ADMIN — BUMETANIDE 0.5 MG: 0.25 INJECTION INTRAMUSCULAR; INTRAVENOUS at 10:22

## 2021-01-01 RX ADMIN — SODIUM CHLORIDE, PRESERVATIVE FREE 10 ML: 5 INJECTION INTRAVENOUS at 20:27

## 2021-01-01 RX ADMIN — INSULIN LISPRO 3 UNITS: 100 INJECTION, SOLUTION INTRAVENOUS; SUBCUTANEOUS at 20:19

## 2021-01-01 RX ADMIN — POTASSIUM CHLORIDE 20 MEQ: 29.8 INJECTION, SOLUTION INTRAVENOUS at 11:21

## 2021-01-01 RX ADMIN — PROPOFOL 20 MCG/KG/MIN: 10 INJECTION, EMULSION INTRAVENOUS at 02:37

## 2021-01-01 RX ADMIN — ASPIRIN 81 MG: 81 TABLET, FILM COATED ORAL at 08:12

## 2021-01-01 RX ADMIN — TIOTROPIUM BROMIDE AND OLODATEROL 2 PUFF: 3.124; 2.736 SPRAY, METERED RESPIRATORY (INHALATION) at 08:15

## 2021-01-01 RX ADMIN — INSULIN GLARGINE 10 UNITS: 100 INJECTION, SOLUTION SUBCUTANEOUS at 20:35

## 2021-01-01 RX ADMIN — ATORVASTATIN CALCIUM 10 MG: 10 TABLET, FILM COATED ORAL at 13:23

## 2021-01-01 RX ADMIN — INSULIN GLARGINE 25 UNITS: 100 INJECTION, SOLUTION SUBCUTANEOUS at 20:30

## 2021-01-01 RX ADMIN — ATORVASTATIN CALCIUM 10 MG: 10 TABLET, FILM COATED ORAL at 08:01

## 2021-01-01 RX ADMIN — VASOPRESSIN 0.02 UNITS/MIN: 20 INJECTION INTRAVENOUS at 10:28

## 2021-01-01 RX ADMIN — VASOPRESSIN 0.03 UNITS/MIN: 20 INJECTION INTRAVENOUS at 13:22

## 2021-01-01 RX ADMIN — VECURONIUM BROMIDE 0.5 MCG/KG/MIN: 20 INJECTION, POWDER, LYOPHILIZED, FOR SOLUTION INTRAVENOUS at 20:32

## 2021-01-01 RX ADMIN — INSULIN LISPRO 6 UNITS: 100 INJECTION, SOLUTION INTRAVENOUS; SUBCUTANEOUS at 12:25

## 2021-01-01 RX ADMIN — DEXAMETHASONE SODIUM PHOSPHATE 10 MG: 4 INJECTION, SOLUTION INTRAMUSCULAR; INTRAVENOUS at 08:14

## 2021-01-01 RX ADMIN — INSULIN LISPRO 6 UNITS: 100 INJECTION, SOLUTION INTRAVENOUS; SUBCUTANEOUS at 17:48

## 2021-01-01 RX ADMIN — LORAZEPAM 0.5 MG: 2 INJECTION INTRAMUSCULAR; INTRAVENOUS at 02:35

## 2021-01-01 RX ADMIN — Medication 32 MCG/MIN: at 06:38

## 2021-01-01 RX ADMIN — AZITHROMYCIN MONOHYDRATE 500 MG: 500 INJECTION, POWDER, LYOPHILIZED, FOR SOLUTION INTRAVENOUS at 12:45

## 2021-01-01 RX ADMIN — ENOXAPARIN SODIUM 30 MG: 100 INJECTION SUBCUTANEOUS at 20:35

## 2021-01-01 RX ADMIN — FOLIC ACID 1 MG: 1 TABLET ORAL at 08:01

## 2021-01-01 RX ADMIN — VASOPRESSIN 0.02 UNITS/MIN: 20 INJECTION INTRAVENOUS at 08:11

## 2021-01-01 RX ADMIN — SODIUM CHLORIDE, PRESERVATIVE FREE 10 ML: 5 INJECTION INTRAVENOUS at 21:09

## 2021-01-01 RX ADMIN — INSULIN LISPRO 3 UNITS: 100 INJECTION, SOLUTION INTRAVENOUS; SUBCUTANEOUS at 17:00

## 2021-01-01 RX ADMIN — ENOXAPARIN SODIUM 30 MG: 100 INJECTION SUBCUTANEOUS at 09:05

## 2021-01-01 RX ADMIN — VECURONIUM BROMIDE 0.5 MCG/KG/MIN: 20 INJECTION, POWDER, LYOPHILIZED, FOR SOLUTION INTRAVENOUS at 08:49

## 2021-01-01 RX ADMIN — PROPOFOL 30 MCG/KG/MIN: 10 INJECTION, EMULSION INTRAVENOUS at 11:09

## 2021-01-01 RX ADMIN — INSULIN GLARGINE 25 UNITS: 100 INJECTION, SOLUTION SUBCUTANEOUS at 21:08

## 2021-01-01 RX ADMIN — PANTOPRAZOLE SODIUM 40 MG: 40 INJECTION, POWDER, FOR SOLUTION INTRAVENOUS at 10:00

## 2021-01-01 RX ADMIN — MEROPENEM 1000 MG: 1 INJECTION, POWDER, FOR SOLUTION INTRAVENOUS at 20:43

## 2021-01-01 RX ADMIN — LEVOTHYROXINE SODIUM 75 MCG: 0.07 TABLET ORAL at 08:54

## 2021-01-01 RX ADMIN — MIDAZOLAM 1 MG/HR: 5 INJECTION, SOLUTION INTRAMUSCULAR; INTRAVENOUS at 06:18

## 2021-01-01 RX ADMIN — Medication 18 MCG/MIN: at 11:42

## 2021-01-01 RX ADMIN — SODIUM CHLORIDE, PRESERVATIVE FREE 10 ML: 5 INJECTION INTRAVENOUS at 07:45

## 2021-01-01 RX ADMIN — LORAZEPAM 0.5 MG: 2 INJECTION INTRAMUSCULAR; INTRAVENOUS at 22:15

## 2021-01-01 RX ADMIN — AMLODIPINE BESYLATE 5 MG: 5 TABLET ORAL at 07:45

## 2021-01-01 RX ADMIN — AMPICILLIN SODIUM 2000 MG: 2 INJECTION, POWDER, FOR SOLUTION INTRAMUSCULAR; INTRAVENOUS at 02:16

## 2021-01-01 RX ADMIN — MEROPENEM 1000 MG: 1 INJECTION, POWDER, FOR SOLUTION INTRAVENOUS at 14:20

## 2021-01-01 RX ADMIN — PANTOPRAZOLE SODIUM 40 MG: 40 INJECTION, POWDER, FOR SOLUTION INTRAVENOUS at 21:50

## 2021-01-01 RX ADMIN — AMPICILLIN SODIUM 2000 MG: 2 INJECTION, POWDER, FOR SOLUTION INTRAMUSCULAR; INTRAVENOUS at 14:31

## 2021-01-01 RX ADMIN — SODIUM CHLORIDE, PRESERVATIVE FREE 10 ML: 5 INJECTION INTRAVENOUS at 08:24

## 2021-01-01 RX ADMIN — SODIUM CHLORIDE, PRESERVATIVE FREE 10 ML: 5 INJECTION INTRAVENOUS at 22:13

## 2021-01-01 RX ADMIN — ENOXAPARIN SODIUM 30 MG: 100 INJECTION SUBCUTANEOUS at 08:08

## 2021-01-01 RX ADMIN — Medication 100 MG: at 10:00

## 2021-01-01 RX ADMIN — TIOTROPIUM BROMIDE AND OLODATEROL 2 PUFF: 3.124; 2.736 SPRAY, METERED RESPIRATORY (INHALATION) at 09:22

## 2021-01-01 RX ADMIN — MIDAZOLAM 4 MG/HR: 5 INJECTION, SOLUTION INTRAMUSCULAR; INTRAVENOUS at 20:26

## 2021-01-01 RX ADMIN — ALBUMIN (HUMAN) 25 G: 0.25 INJECTION, SOLUTION INTRAVENOUS at 04:47

## 2021-01-01 RX ADMIN — FUROSEMIDE 40 MG: 10 INJECTION, SOLUTION INTRAMUSCULAR; INTRAVENOUS at 09:46

## 2021-01-01 RX ADMIN — DEXTROSE AND SODIUM CHLORIDE: 5; 450 INJECTION, SOLUTION INTRAVENOUS at 10:37

## 2021-01-01 RX ADMIN — INSULIN HUMAN 10 UNITS: 100 INJECTION, SOLUTION PARENTERAL at 20:25

## 2021-01-01 RX ADMIN — LEVOTHYROXINE SODIUM 75 MCG: 0.07 TABLET ORAL at 08:15

## 2021-01-01 RX ADMIN — AZITHROMYCIN MONOHYDRATE 500 MG: 500 INJECTION, POWDER, LYOPHILIZED, FOR SOLUTION INTRAVENOUS at 11:46

## 2021-01-01 RX ADMIN — PROPOFOL 30 MCG/KG/MIN: 10 INJECTION, EMULSION INTRAVENOUS at 22:41

## 2021-01-01 RX ADMIN — METOPROLOL SUCCINATE 50 MG: 50 TABLET, EXTENDED RELEASE ORAL at 07:45

## 2021-01-01 RX ADMIN — SODIUM CHLORIDE, PRESERVATIVE FREE 10 ML: 5 INJECTION INTRAVENOUS at 21:14

## 2021-01-01 RX ADMIN — VASOPRESSIN 0.03 UNITS/MIN: 20 INJECTION INTRAVENOUS at 11:08

## 2021-01-01 RX ADMIN — INSULIN GLARGINE 18 UNITS: 100 INJECTION, SOLUTION SUBCUTANEOUS at 20:27

## 2021-01-01 RX ADMIN — PROPOFOL 30 MCG/KG/MIN: 10 INJECTION, EMULSION INTRAVENOUS at 01:50

## 2021-01-01 RX ADMIN — INSULIN GLARGINE 18 UNITS: 100 INJECTION, SOLUTION SUBCUTANEOUS at 23:35

## 2021-01-01 RX ADMIN — Medication 28 MCG/MIN: at 11:45

## 2021-01-01 RX ADMIN — ATORVASTATIN CALCIUM 10 MG: 10 TABLET, FILM COATED ORAL at 07:45

## 2021-01-01 RX ADMIN — VECURONIUM BROMIDE 0.5 MCG/KG/MIN: 20 INJECTION, POWDER, LYOPHILIZED, FOR SOLUTION INTRAVENOUS at 16:22

## 2021-01-01 RX ADMIN — PROPOFOL 20 MCG/KG/MIN: 10 INJECTION, EMULSION INTRAVENOUS at 23:30

## 2021-01-01 RX ADMIN — MEROPENEM 1000 MG: 1 INJECTION, POWDER, FOR SOLUTION INTRAVENOUS at 04:48

## 2021-01-01 RX ADMIN — METOPROLOL SUCCINATE 50 MG: 50 TABLET, EXTENDED RELEASE ORAL at 09:14

## 2021-01-01 RX ADMIN — MIDAZOLAM 3 MG/HR: 5 INJECTION, SOLUTION INTRAMUSCULAR; INTRAVENOUS at 23:04

## 2021-01-01 RX ADMIN — SODIUM CHLORIDE, PRESERVATIVE FREE 10 ML: 5 INJECTION INTRAVENOUS at 10:08

## 2021-01-01 RX ADMIN — ENOXAPARIN SODIUM 30 MG: 100 INJECTION SUBCUTANEOUS at 08:33

## 2021-01-01 RX ADMIN — VASOPRESSIN 0.02 UNITS/MIN: 20 INJECTION INTRAVENOUS at 12:14

## 2021-01-01 RX ADMIN — POTASSIUM CHLORIDE 20 MEQ: 29.8 INJECTION, SOLUTION INTRAVENOUS at 12:26

## 2021-01-01 RX ADMIN — PANTOPRAZOLE SODIUM 40 MG: 40 INJECTION, POWDER, FOR SOLUTION INTRAVENOUS at 08:44

## 2021-01-01 RX ADMIN — PROPOFOL 40 MCG/KG/MIN: 10 INJECTION, EMULSION INTRAVENOUS at 23:58

## 2021-01-01 RX ADMIN — PROPOFOL 30 MCG/KG/MIN: 10 INJECTION, EMULSION INTRAVENOUS at 02:38

## 2021-01-01 RX ADMIN — Medication 100 MG: at 21:11

## 2021-01-01 RX ADMIN — SODIUM CHLORIDE, PRESERVATIVE FREE 20 ML: 5 INJECTION INTRAVENOUS at 08:30

## 2021-01-01 RX ADMIN — LEVOTHYROXINE SODIUM 75 MCG: 0.07 TABLET ORAL at 09:35

## 2021-01-01 RX ADMIN — DEXTROSE MONOHYDRATE: 50 INJECTION, SOLUTION INTRAVENOUS at 03:10

## 2021-01-01 RX ADMIN — SODIUM CHLORIDE 1000 ML: 9 INJECTION, SOLUTION INTRAVENOUS at 13:13

## 2021-01-01 RX ADMIN — ENOXAPARIN SODIUM 30 MG: 100 INJECTION SUBCUTANEOUS at 08:12

## 2021-01-01 RX ADMIN — PROPOFOL 40 MCG/KG/MIN: 10 INJECTION, EMULSION INTRAVENOUS at 05:30

## 2021-01-01 RX ADMIN — AMLODIPINE BESYLATE 5 MG: 5 TABLET ORAL at 09:59

## 2021-01-01 RX ADMIN — ASPIRIN 81 MG: 81 TABLET, FILM COATED ORAL at 08:33

## 2021-01-01 RX ADMIN — ENOXAPARIN SODIUM 30 MG: 100 INJECTION SUBCUTANEOUS at 20:39

## 2021-01-01 RX ADMIN — ENOXAPARIN SODIUM 30 MG: 100 INJECTION SUBCUTANEOUS at 21:36

## 2021-01-01 RX ADMIN — TIOTROPIUM BROMIDE AND OLODATEROL 2 PUFF: 3.124; 2.736 SPRAY, METERED RESPIRATORY (INHALATION) at 09:45

## 2021-01-01 RX ADMIN — ATORVASTATIN CALCIUM 10 MG: 10 TABLET, FILM COATED ORAL at 08:42

## 2021-01-01 RX ADMIN — ENOXAPARIN SODIUM 30 MG: 100 INJECTION SUBCUTANEOUS at 19:51

## 2021-01-01 RX ADMIN — ASPIRIN 81 MG CHEWABLE TABLET 81 MG: 81 TABLET CHEWABLE at 09:05

## 2021-01-01 RX ADMIN — MEROPENEM 1000 MG: 1 INJECTION, POWDER, FOR SOLUTION INTRAVENOUS at 13:25

## 2021-01-01 RX ADMIN — INSULIN LISPRO 2 UNITS: 100 INJECTION, SOLUTION INTRAVENOUS; SUBCUTANEOUS at 18:14

## 2021-01-01 RX ADMIN — IOPAMIDOL 80 ML: 755 INJECTION, SOLUTION INTRAVENOUS at 13:44

## 2021-01-01 RX ADMIN — INSULIN LISPRO 9 UNITS: 100 INJECTION, SOLUTION INTRAVENOUS; SUBCUTANEOUS at 18:30

## 2021-01-01 RX ADMIN — Medication 18 MCG/MIN: at 23:03

## 2021-01-01 RX ADMIN — DEXTROSE MONOHYDRATE: 50 INJECTION, SOLUTION INTRAVENOUS at 07:51

## 2021-01-01 RX ADMIN — SODIUM CHLORIDE, PRESERVATIVE FREE 10 ML: 5 INJECTION INTRAVENOUS at 21:56

## 2021-01-01 RX ADMIN — INSULIN LISPRO 6 UNITS: 100 INJECTION, SOLUTION INTRAVENOUS; SUBCUTANEOUS at 08:54

## 2021-01-01 RX ADMIN — INSULIN LISPRO 6 UNITS: 100 INJECTION, SOLUTION INTRAVENOUS; SUBCUTANEOUS at 11:22

## 2021-01-01 RX ADMIN — SODIUM POLYSTYRENE SULFONATE 30 G: 15 SUSPENSION ORAL; RECTAL at 12:00

## 2021-01-01 RX ADMIN — HEPARIN SODIUM 5000 UNITS: 5000 INJECTION INTRAVENOUS; SUBCUTANEOUS at 13:30

## 2021-01-01 RX ADMIN — CHLORHEXIDINE GLUCONATE 0.12% ORAL RINSE 15 ML: 1.2 LIQUID ORAL at 10:46

## 2021-01-01 RX ADMIN — MEROPENEM 1000 MG: 1 INJECTION, POWDER, FOR SOLUTION INTRAVENOUS at 12:52

## 2021-01-01 RX ADMIN — ENOXAPARIN SODIUM 30 MG: 100 INJECTION SUBCUTANEOUS at 20:43

## 2021-01-01 RX ADMIN — ENOXAPARIN SODIUM 30 MG: 100 INJECTION SUBCUTANEOUS at 20:22

## 2021-01-01 RX ADMIN — DEXAMETHASONE 6 MG: 4 TABLET ORAL at 13:24

## 2021-01-01 RX ADMIN — Medication 18 MCG/MIN: at 07:48

## 2021-01-01 RX ADMIN — ENOXAPARIN SODIUM 30 MG: 100 INJECTION SUBCUTANEOUS at 09:59

## 2021-01-01 RX ADMIN — PIPERACILLIN AND TAZOBACTAM 3375 MG: 3; .375 INJECTION, POWDER, LYOPHILIZED, FOR SOLUTION INTRAVENOUS at 11:48

## 2021-01-01 RX ADMIN — ASPIRIN 81 MG: 81 TABLET, FILM COATED ORAL at 09:33

## 2021-01-01 RX ADMIN — PROPOFOL 25 MCG/KG/MIN: 10 INJECTION, EMULSION INTRAVENOUS at 13:08

## 2021-01-01 RX ADMIN — SODIUM CHLORIDE, PRESERVATIVE FREE 10 ML: 5 INJECTION INTRAVENOUS at 20:43

## 2021-01-01 RX ADMIN — TIOTROPIUM BROMIDE AND OLODATEROL 2 PUFF: 3.124; 2.736 SPRAY, METERED RESPIRATORY (INHALATION) at 08:10

## 2021-01-01 RX ADMIN — MEROPENEM 1000 MG: 1 INJECTION, POWDER, FOR SOLUTION INTRAVENOUS at 04:04

## 2021-01-01 RX ADMIN — LEVOTHYROXINE SODIUM 75 MCG: 0.07 TABLET ORAL at 07:59

## 2021-01-01 RX ADMIN — Medication: at 13:04

## 2021-01-01 RX ADMIN — CHLORHEXIDINE GLUCONATE 0.12% ORAL RINSE 15 ML: 1.2 LIQUID ORAL at 21:09

## 2021-01-01 RX ADMIN — BARICITINIB 2 MG: 2 TABLET, FILM COATED ORAL at 08:14

## 2021-01-01 RX ADMIN — SODIUM CHLORIDE, PRESERVATIVE FREE 10 ML: 5 INJECTION INTRAVENOUS at 19:44

## 2021-01-01 RX ADMIN — TIOTROPIUM BROMIDE AND OLODATEROL 2 PUFF: 3.124; 2.736 SPRAY, METERED RESPIRATORY (INHALATION) at 07:58

## 2021-01-01 RX ADMIN — PANTOPRAZOLE SODIUM 40 MG: 40 INJECTION, POWDER, FOR SOLUTION INTRAVENOUS at 21:19

## 2021-01-01 RX ADMIN — HEPARIN SODIUM 5000 UNITS: 5000 INJECTION INTRAVENOUS; SUBCUTANEOUS at 06:24

## 2021-01-01 RX ADMIN — INSULIN LISPRO 3 UNITS: 100 INJECTION, SOLUTION INTRAVENOUS; SUBCUTANEOUS at 20:40

## 2021-01-01 RX ADMIN — BARICITINIB 2 MG: 2 TABLET, FILM COATED ORAL at 09:59

## 2021-01-01 RX ADMIN — FLUCONAZOLE 400 MG: 400 INJECTION, SOLUTION INTRAVENOUS at 14:33

## 2021-01-01 RX ADMIN — ALBUMIN (HUMAN) 12.5 G: 0.25 INJECTION, SOLUTION INTRAVENOUS at 10:34

## 2021-01-01 RX ADMIN — METOPROLOL SUCCINATE 50 MG: 50 TABLET, EXTENDED RELEASE ORAL at 09:26

## 2021-01-01 RX ADMIN — SODIUM CHLORIDE, PRESERVATIVE FREE 10 ML: 5 INJECTION INTRAVENOUS at 08:40

## 2021-01-01 RX ADMIN — LEVOTHYROXINE SODIUM 75 MCG: 0.07 TABLET ORAL at 04:55

## 2021-01-01 RX ADMIN — FLUCONAZOLE IN SODIUM CHLORIDE 200 MG: 2 INJECTION, SOLUTION INTRAVENOUS at 15:34

## 2021-01-01 RX ADMIN — HEPARIN SODIUM 5000 UNITS: 5000 INJECTION INTRAVENOUS; SUBCUTANEOUS at 06:26

## 2021-01-01 RX ADMIN — INSULIN LISPRO 6 UNITS: 100 INJECTION, SOLUTION INTRAVENOUS; SUBCUTANEOUS at 08:52

## 2021-01-01 RX ADMIN — PIPERACILLIN AND TAZOBACTAM 3375 MG: 3; .375 INJECTION, POWDER, LYOPHILIZED, FOR SOLUTION INTRAVENOUS at 00:00

## 2021-01-01 RX ADMIN — ONDANSETRON 4 MG: 2 INJECTION INTRAMUSCULAR; INTRAVENOUS at 05:11

## 2021-01-01 RX ADMIN — INSULIN LISPRO 6 UNITS: 100 INJECTION, SOLUTION INTRAVENOUS; SUBCUTANEOUS at 18:27

## 2021-01-01 RX ADMIN — MIDAZOLAM 6 MG/HR: 5 INJECTION, SOLUTION INTRAMUSCULAR; INTRAVENOUS at 04:03

## 2021-01-01 RX ADMIN — Medication 32 MCG/MIN: at 11:10

## 2021-01-01 RX ADMIN — INSULIN GLARGINE 25 UNITS: 100 INJECTION, SOLUTION SUBCUTANEOUS at 20:26

## 2021-01-01 RX ADMIN — PANTOPRAZOLE SODIUM 40 MG: 40 INJECTION, POWDER, FOR SOLUTION INTRAVENOUS at 22:00

## 2021-01-01 RX ADMIN — PROPOFOL 50 MCG/KG/MIN: 10 INJECTION, EMULSION INTRAVENOUS at 03:53

## 2021-01-01 RX ADMIN — MEROPENEM 1000 MG: 1 INJECTION, POWDER, FOR SOLUTION INTRAVENOUS at 20:39

## 2021-01-01 RX ADMIN — AZITHROMYCIN MONOHYDRATE 500 MG: 500 INJECTION, POWDER, LYOPHILIZED, FOR SOLUTION INTRAVENOUS at 12:52

## 2021-01-01 RX ADMIN — PANTOPRAZOLE SODIUM 40 MG: 40 INJECTION, POWDER, FOR SOLUTION INTRAVENOUS at 21:30

## 2021-01-01 RX ADMIN — Medication 21.33 MCG/MIN: at 09:31

## 2021-01-01 RX ADMIN — LEVOTHYROXINE SODIUM 75 MCG: 0.07 TABLET ORAL at 06:40

## 2021-01-01 RX ADMIN — Medication 38 MCG/MIN: at 15:41

## 2021-01-01 RX ADMIN — DOCUSATE SODIUM 100 MG: 100 CAPSULE ORAL at 20:15

## 2021-01-01 RX ADMIN — PROPOFOL 40 MCG/KG/MIN: 10 INJECTION, EMULSION INTRAVENOUS at 07:06

## 2021-01-01 RX ADMIN — ASPIRIN 81 MG CHEWABLE TABLET 81 MG: 81 TABLET CHEWABLE at 08:44

## 2021-01-01 RX ADMIN — INSULIN LISPRO 5 UNITS: 100 INJECTION, SOLUTION INTRAVENOUS; SUBCUTANEOUS at 20:30

## 2021-01-01 RX ADMIN — HEPARIN SODIUM 5000 UNITS: 5000 INJECTION INTRAVENOUS; SUBCUTANEOUS at 23:56

## 2021-01-01 RX ADMIN — Medication 100 MG: at 21:09

## 2021-01-01 RX ADMIN — CHLORHEXIDINE GLUCONATE 0.12% ORAL RINSE 15 ML: 1.2 LIQUID ORAL at 09:59

## 2021-01-01 ASSESSMENT — PAIN SCALES - GENERAL
PAINLEVEL_OUTOF10: 0
PAINLEVEL_OUTOF10: 2
PAINLEVEL_OUTOF10: 0
PAINLEVEL_OUTOF10: 3
PAINLEVEL_OUTOF10: 3
PAINLEVEL_OUTOF10: 0

## 2021-01-01 ASSESSMENT — PAIN DESCRIPTION - ORIENTATION
ORIENTATION: RIGHT;LEFT
ORIENTATION: LOWER

## 2021-01-01 ASSESSMENT — PAIN - FUNCTIONAL ASSESSMENT
PAIN_FUNCTIONAL_ASSESSMENT: ACTIVITIES ARE NOT PREVENTED
PAIN_FUNCTIONAL_ASSESSMENT: ACTIVITIES ARE NOT PREVENTED

## 2021-01-01 ASSESSMENT — PULMONARY FUNCTION TESTS
PIF_VALUE: 33
PIF_VALUE: 27
PIF_VALUE: 25
PIF_VALUE: 39
PIF_VALUE: 28
PIF_VALUE: 30
PIF_VALUE: 25
PIF_VALUE: 37
PIF_VALUE: 27
PIF_VALUE: 33
PIF_VALUE: 33
PIF_VALUE: 37
PIF_VALUE: 29
PIF_VALUE: 37
PIF_VALUE: 31
PIF_VALUE: 37
PIF_VALUE: 31
PIF_VALUE: 29
PIF_VALUE: 23
PIF_VALUE: 33
PIF_VALUE: 36
PIF_VALUE: 34
PIF_VALUE: 27
PIF_VALUE: 33
PIF_VALUE: 37
PIF_VALUE: 33
PIF_VALUE: 27
PIF_VALUE: 37
PIF_VALUE: 18
PIF_VALUE: 29
PIF_VALUE: 36
PIF_VALUE: 29
PIF_VALUE: 37
PIF_VALUE: 23
PIF_VALUE: 37
PIF_VALUE: 36
PIF_VALUE: 27
PIF_VALUE: 25
PIF_VALUE: 27
PIF_VALUE: 27
PIF_VALUE: 31
PIF_VALUE: 29
PIF_VALUE: 37
PIF_VALUE: 27
PIF_VALUE: 37
PIF_VALUE: 37
PIF_VALUE: 36
PIF_VALUE: 28
PIF_VALUE: 29
PIF_VALUE: 33
PIF_VALUE: 29
PIF_VALUE: 31
PIF_VALUE: 33
PIF_VALUE: 34
PIF_VALUE: 34
PIF_VALUE: 28
PIF_VALUE: 27
PIF_VALUE: 29
PIF_VALUE: 19
PIF_VALUE: 29
PIF_VALUE: 38
PIF_VALUE: 25
PIF_VALUE: 33
PIF_VALUE: 27

## 2021-01-01 ASSESSMENT — ENCOUNTER SYMPTOMS
ABDOMINAL PAIN: 0
NAUSEA: 0
VOMITING: 0
SINUS PAIN: 0
SHORTNESS OF BREATH: 1
SORE THROAT: 0
EYE REDNESS: 0
RHINORRHEA: 0
DIARRHEA: 0
BACK PAIN: 0
COUGH: 1

## 2021-01-01 ASSESSMENT — PAIN DESCRIPTION - LOCATION
LOCATION: BACK
LOCATION: HEAD

## 2021-01-01 ASSESSMENT — PAIN DESCRIPTION - PROGRESSION
CLINICAL_PROGRESSION: GRADUALLY WORSENING
CLINICAL_PROGRESSION: GRADUALLY WORSENING

## 2021-01-01 ASSESSMENT — PAIN DESCRIPTION - ONSET
ONSET: ON-GOING
ONSET: ON-GOING

## 2021-01-01 ASSESSMENT — PAIN DESCRIPTION - PAIN TYPE
TYPE: ACUTE PAIN
TYPE: ACUTE PAIN

## 2021-01-01 ASSESSMENT — PAIN DESCRIPTION - DESCRIPTORS
DESCRIPTORS: ACHING;HEADACHE
DESCRIPTORS: ACHING

## 2021-01-01 ASSESSMENT — PAIN DESCRIPTION - FREQUENCY
FREQUENCY: CONTINUOUS
FREQUENCY: CONTINUOUS

## 2021-07-19 PROBLEM — E78.5 HYPERLIPIDEMIA: Status: ACTIVE | Noted: 2021-01-01

## 2021-07-19 PROBLEM — E79.0 ELEVATED URIC ACID IN BLOOD: Status: ACTIVE | Noted: 2021-01-01

## 2021-07-19 PROBLEM — E11.9 DIABETES MELLITUS (HCC): Status: ACTIVE | Noted: 2021-01-01

## 2021-07-19 PROBLEM — E83.52 SERUM CALCIUM ELEVATED: Status: ACTIVE | Noted: 2021-01-01

## 2021-07-19 PROBLEM — I10 ESSENTIAL HYPERTENSION: Status: ACTIVE | Noted: 2021-01-01

## 2021-07-19 PROBLEM — E03.9 HYPOTHYROIDISM: Status: ACTIVE | Noted: 2021-01-01

## 2021-10-21 PROBLEM — U07.1 ACUTE DISEASE DUE TO SEVERE ACUTE RESPIRATORY SYNDROME CORONAVIRUS 2 (SARS-COV-2): Status: ACTIVE | Noted: 2021-01-01

## 2021-10-21 NOTE — ED TRIAGE NOTES
Patient arrives via EMS from home with complaints of N/V and weakness. Patient is 89% on RA upon arrival.  Patient denies pain. Admits to \"a little bit\" of shortness of breath.

## 2021-10-21 NOTE — H&P
Hospitalist History and Physical          Patient: Sabrina Avendano  : 1940  MRN: [id number]     Acct: [de-identified]    PCP: CESAR Crum CNP  Date of Admission: 10/21/2021  Date of Service: Pt seen/examined on 10/21/21  and Admitted to Inpatient with expected LOS greater than two midnights due to medical therapy. Hospital Problems         Last Modified POA    Acute disease due to severe acute respiratory syndrome coronavirus 2 (SARS-CoV-2) 10/21/2021 Yes          Assessment and Plan:  Acute respiratory failure hypoxemic    Oxygen saturations below 90% at ED arriving, currently requiring oxygen cannula 2 L. No previous history of COPD/asthma. Radiological evidence of any lung parenchyma compromise. Initial steroid charge was administered in the emergency department. We will continue during admission. COVID-19 pneumonia    No additional respiratory symptoms apart of shortness of breath, chest x-ray with typical findings of peripheral compromise however showing masslike image suggestive of neoplasia, CT chest scan ruled out pulmonary embolism, previous finding of neoplasia corresponds more than chronic process related with previous multiple pneumonias. Procalcitonin is slightly positive, no leukocytosis no neutrophilia with new procalcitonin requirement of antibiotic therapy. COVID-19 infection moderate risk    Given by low oxygen saturations, 3 risk factors associated, elevated inflammatory reactants, LDH, ferritin, lactate. Daily steroids were prescribed, patient qualifies for baricitinib.  Dehydration/electrolyte imbalance    Given by fever and low oral intake. Moderate hyponatremia associated, previous sodium values were also low. Initial reposition with SSN 0.91 L, pending none control electrolytes we will reorient treatment.      Chronic Conditions (reviewed and stable unless otherwise stated)  I  o Hypertensive heart disease with CHF  o Type 2 diabetes  o Dyslipidemia  o Takotsubo cardiomyopathy  o CAD, last Cath Lab 50% RCA             =======================================================================      Chief Complaint: Weakness, fatigue    History Of Present Illness:  Belle Zeng is a 79 y.o. female with PMHx of hypertension, diabetes, hyperlipidemia, CHF, CAD, hypothyroidism who presents to [address] [place] [address] with 2 weeks of weakness fatigue and low energy, today in the morning patient refers shortness of breath no fever reason why she called EMS who found her with oxygen saturations below 90 requiring oxygen cannula 2 L/min. Patient also refers that fatigue has been worsening associated today morning with shortness of breath, patient denies chest pain no abdominal pain no diarrhea no urinary symptoms no headache or lightheadedness. ED course: Patient was placed in oxygen cannula 2 L initial laboratory test were drawn, patient with no COVID-19 vaccine, rapid Covid test came positive chest x-ray shows infiltrates suggestive of COVID-19 pneumonia reason why steroid therapy was initiated as well has admission, patient with hypoxic respiratory failure which will be admitted.     Past Medical History:        Diagnosis Date    CAD (coronary artery disease) 06/2017    [facility]    CHF (congestive heart failure) ([facility] 2017    Echo 07/06/2017 EF 35-40% -- 08/22/2017 EF 55-60%    CKD (chronic kidney disease)     Diabetes mellitus ([facility]     Elevated parathyroid hormone 08/21/2019    GERD (gastroesophageal reflux disease)     Hypercalcemia     Hyperlipidemia     Hypertension     Hypothyroidism     Low vitamin D level     Macrocytosis 07/27/2020    OA (osteoarthritis)     Osteoporosis 08/2019    DEXA scan FRAX 18.1% / 4.1%    Takotsubo cardiomyopathy 07/20/2017       Past Surgical History:        Procedure Laterality Date    APPENDECTOMY      BREAST BIOPSY      x 3    CARDIAC CATHETERIZATION  06/2017    CHOLECYSTECTOMY      CALCIUM 8.9     Recent Labs     10/21/21  1130   AST 36   ALT 12   BILIDIR <0.2   BILITOT 0.3   ALKPHOS 71     No results for input(s): INR in the last 72 hours. No results for input(s): Rhae Childes in the last 72 hours. No results found for: [place], BACTERIA, RBCUA, BLOODU, [address], GLUCOSEU      Radiology:     CTA CHEST W WO CONTRAST   Final Result       1. No evidence of pulmonary embolus. 2. Diffuse patchy groundglass opacities more pronounced at the periphery and lung bases as evidence for atypical infectious/inflammatory process to include viral pneumonia. 3. Masslike opacity in the right upper lobe anteriorly with linear and patchy opacities extending to the pleura. This likely represents the same infectious/inflammatory process as the other groundglass opacities. However, underlying neoplasm can't be    excluded. Recommend short-term follow-up imaging to resolution. **This report has been created using voice recognition software. It may contain minor errors which are inherent in voice recognition technology. **      Final report electronically signed by Dr. Kendall Fisher MD on 10/21/2021 2:10 PM      CT ABDOMEN PELVIS W IV CONTRAST Additional Contrast? None   Final Result       1. No evidence of acute intra-abdominal or intrapelvic abnormality. 2. Multiple renal cystic lesions, not fully characterized on this exam. A cystic lesion at the inferior pole of the left kidney adjacent fat stranding towards follow-up with multiphase CT or MRI to exclude an enhancing component. 3. Prominent colonic diverticulosis. 4. Hepatic steatosis. 5. Small to moderate hiatal hernia. **This report has been created using voice recognition software. It may contain minor errors which are inherent in voice recognition technology. **      Final report electronically signed by Dr. Kendall Fisher MD on 10/21/2021 2:16 PM      XR CHEST PORTABLE   Final Result      1.  A 4.9 x 3.3 cm right upper lobe mass is seen along the right paratracheal region. A CT of the chest, abdomen, and pelvis with contrast is recommended for further evaluation. 2. Other nodular opacities are noted in both lungs that felt to be related to the underlying lung mass. 3. Mild increase in pulmonary interstitial markings. **This report has been created using voice recognition software. It may contain minor errors which are inherent in voice recognition technology. **      Final report electronically signed by Dr Little Pugh on 10/21/2021 11:58 AM             EKG:  I have reviewed the EKG with the following interpretation: Sinus rhythm, normal axis, normal P wave, occasional supraventricular extrasystole, normal AR segment, narrow QRS, normal J-point, no ST elevation no ST depressions, normal T waves, normal QTC      PT/OT Eval Status:  will be assessed  Diet: ADULT DIET; Regular; 3 carb choices (45 gm/meal); Low Sodium (2 gm)  DVT prophylaxis: Lovenox  Code Status: Limited  Disposition: admit to internal medicine    Thank you Dr. Padmini Vitale DO, Dr. Anum Franco MD for the opportunity to be involved in this patient's care.     Electronically signed by Julio Haddad MD on 10/21/2021 at 2:46 PM.

## 2021-10-21 NOTE — FLOWSHEET NOTE
10/21/21 1828   Encounter Summary   Services provided to: Patient   Referral/Consult From: [address] [facility] [address]. Continue Visiting   (10/21 yes for disease adjustment.)   Complexity of Encounter Moderate   Length of Encounter 45 minutes   Grief and Life Adjustment   Type Adjustment to illness   Assessment Approachable;Coping; Hopeful   Intervention Explored feelings, thoughts, concerns; Discussed illness/injury and it's impact; Discussed relationship with God;Prayer   Outcome Engaged in conversation;Coping     Mistake error--charting done on wrong patient.  [facility]

## 2021-10-21 NOTE — ED NOTES
Pt transported to [facility]B22 by cart in stable condition. Called [facility] and informed them that the patient was on their way to the unit.      Moni Ortiz LPN  42/[id number]/81 [id number]

## 2021-10-21 NOTE — ED PROVIDER NOTES
[address], Pt Name: Markell Abbasi  MRN: [id number]  [place] 1940  Date of evaluation: 9/12/20      I personally saw and examined the patient. I have reviewed and agree with the Resident findings, including all diagnostic interpretations and treatment plans as written. I was present for the key portion of any procedures performed and the inclusive time noted in any critical care statement. History:     Patient was seen with Bright Hare, resident physician. This is an 77-year-old female who presented with generalized fatigue, loss of appetite and dyspnea. She has now tested positive for COVID-19. We have arranged for admission as she is hypoxemic. We applied supplemental oxygen. She says she is feeling better. She is noted to be febrile. There is a question of a mass on the plain film and we will order CT of the chest to further evaluate while admission is pending.       Diagnosis:  Covid related pneumonia with hypoxemia  Admitted              Curtis Mann DO  10/21/21 1241

## 2021-10-21 NOTE — ED PROVIDER NOTES
[facility] ENCOUNTER          Pt Name: Pritesh Burns  MRN: [id number]  [place] 1940  Date of evaluation: 10/21/2021  Treating Resident Physician: Dania Barreto MD  Supervising Physician: Dr Marcellus Licea       Chief Complaint   Patient presents with    Fatigue     History obtained from the patient. HISTORY OF PRESENT ILLNESS    HPI  Pritesh Burns is a 79 y.o. female with past medical history of CAD, CHF, CKD, diabetes mellitus, GERD, hypertension, hyperlipidemia, hypothyroidism who presents to the emergency department for evaluation of generalized fatigue over the past 2 weeks initially was improving however worsened over the past few days. She was also short of breath this a.m. Upon arrival here she was hypoxic requiring submental oxygenation. Patient is unvaccinated against COVID-19. Describes having a seasonal cough but states has been no worsening of her cough for the past couple days. Also describes having some fever with with 7 episodes of night sweats. Denies any chest pains, palpitations, abdominal pain, nausea vomiting or diarrhea. The patient has no other acute complaints at this time. REVIEW OF SYSTEMS   Review of Systems   Constitutional: Positive for chills and fever. HENT: Negative for rhinorrhea, sinus pain and sore throat. Eyes: Negative for redness. Respiratory: Positive for cough and shortness of breath. Cardiovascular: Negative for chest pain. Gastrointestinal: Negative for abdominal pain, diarrhea, nausea and vomiting. Genitourinary: Negative for dysuria. Musculoskeletal: Negative for back pain. Skin: Negative for rash. Neurological: Negative for light-headedness and headaches. Psychiatric/Behavioral: Negative for agitation.          PAST MEDICAL AND SURGICAL HISTORY     Past Medical History:   Diagnosis Date    [facility] disease) 06[facility]    CHF (congestive heart failure) ([facility] 2017    Echo 07/06/2017 EF 35-40% -- 08/22/2017 EF 55-60%    CKD (chronic kidney disease)     Diabetes mellitus ([facility]     Elevated parathyroid hormone 08/21/2019    GERD (gastroesophageal reflux disease)     Hypercalcemia     Hyperlipidemia     Hypertension     Hypothyroidism     Low vitamin D level     Macrocytosis 07/27/2020    OA (osteoarthritis)     Osteoporosis 08/2019    DEXA scan FRAX 18.1% / 4.1%    Takotsubo cardiomyopathy 07/20/2017     Past Surgical History:   Procedure Laterality Date    APPENDECTOMY      BREAST BIOPSY      x 3    CARDIAC CATHETERIZATION  06/2017    CHOLECYSTECTOMY      COLONOSCOPY      [place] - date not known    HYSTERECTOMY, TOTAL ABDOMINAL      KNEE ARTHROSCOPY Bilateral     SHOULDER SURGERY           MEDICATIONS     Current Facility-Administered Medications:     0.9 % sodium chloride bolus, 1,000 mL, IntraVENous, Once, Eleonora Glover MD    Dexamethasone Sodium Phosphate injection 10 mg, 10 mg, IntraVENous, Once, Eleonora Glover MD    Current Outpatient Medications:     isosorbide mononitrate (IMDUR) 30 MG extended release tablet, Take 1 tablet by mouth once daily, Disp: 90 tablet, Rfl: 0    simvastatin (ZOCOR) 10 MG tablet, Take 1 tablet by mouth nightly, Disp: 30 tablet, Rfl: 5    Cholecalciferol (VITAMIN D) 50 MCG (2000 UT) CAPS capsule, Take 1 capsule by mouth daily, Disp: , Rfl:     metFORMIN (GLUCOPHAGE) 500 MG tablet, Take 1 tablet by mouth once daily, Disp: 90 tablet, Rfl: 0    cetirizine (ZYRTEC) 10 MG tablet, TAKE 1 TABLET BY MOUTH ONCE DAILY AS NEEDED FOR ALLERGIES, Disp: 100 tablet, Rfl: 1    allopurinol (ZYLOPRIM) 100 MG tablet, Take 1 tablet by mouth once daily, Disp: 90 tablet, Rfl: 1    metoprolol succinate (TOPROL XL) 50 MG extended release tablet, TAKE 1 TABLET BY MOUTH ONCE DAILY (HAS MARCH APPOINTMENT), Disp: 90 tablet, Rfl: 1    EUTHYROX 75 MCG tablet, Take 1 tablet by mouth once daily, Disp: 90 tablet, Rfl: 1    metoprolol succinate (TOPROL XL) 25 MG extended release tablet, TAKE 1 TABLET BY MOUTH ONCE DAILY (TAKE WITH  50  MG  FOR  A  TOTAL  OF  75  MG), Disp: 90 tablet, Rfl: 1    amLODIPine (NORVASC) 5 MG tablet, Take 1 tablet by mouth once daily, Disp: 90 tablet, Rfl: 1    aspirin 81 MG EC tablet, Take 81 mg by mouth daily, Disp: , Rfl:     Cetirizine HCl (ZYRTEC ALLERGY) 10 MG CAPS, TAKE 1 TABLET BY MOUTH ONCE DAILY AS NEEDED FOR ALLERGIES, Disp: , Rfl:     loperamide (IMODIUM) 2 MG capsule, Take 2 mg by mouth 4 times daily as needed for Diarrhea, Disp: , Rfl:       SOCIAL HISTORY     Social History     Social History Narrative    Not on file     Social History     Tobacco Use    Smoking status: Former Smoker     Packs/day: 0.25     Years: 1.00     Pack years: 0.25    Smokeless tobacco: Never Used   Vaping Use    Vaping Use: Never assessed   Substance Use Topics    Alcohol use: Never    Drug use: Never         ALLERGIES     Allergies   Allergen Reactions    Cephalexin Anaphylaxis and Other (See Comments)     \"throat and tongue swells up\"      Lisinopril     Losartan     Other      AVOID NEPHROTOXIC AGENTS, PER ESHA PATEL 8/21/2019, CKD    Fosamax [Alendronate Sodium] Nausea And Vomiting         FAMILY HISTORY     Family History   Problem Relation Age of Onset    Diabetes Mother     High Blood Pressure Mother     Stroke Father     Diabetes Sister     Cancer Sister         Liver cancer, lung cancer         PREVIOUS RECORDS   Previous records reviewed: Patient was seen at general internal medicine at [facility] on 3/21/2018 for Takotsubo cardiomyopathy. PHYSICAL EXAM     ED Triage Vitals [10/21/21 1102]   BP Temp Temp Source Pulse Resp SpO2 Height Weight   139/74 100.8 °F (38.2 °C) Oral 86 22 (!) 89 % 5' 2\" (1.575 m) 190 lb (86.2 kg)     Initial vital signs and nursing assessment reviewed and abnormal from Febrile.  Pulsoximetry is abnormal per my interpretation. Additional Vital Signs:  Vitals:    10/21/21 1143   BP:    Pulse:    Resp:    Temp:    SpO2: 92%       Physical Exam  Vitals and nursing note reviewed. Constitutional:       General: She is in acute distress. Appearance: She is ill-appearing. She is not toxic-appearing. HENT:      Head: Normocephalic and atraumatic. Right Ear: External ear normal.      Left Ear: External ear normal.      Nose: Nose normal.      Mouth/Throat:      Mouth: Mucous membranes are dry. Pharynx: Oropharynx is clear. Eyes:      General: No scleral icterus. Conjunctiva/sclera: Conjunctivae normal.   Cardiovascular:      Rate and Rhythm: Normal rate and regular rhythm. Pulses: Normal pulses. Heart sounds: Normal heart sounds. Pulmonary:      Effort: Respiratory distress present. Breath sounds: No rales. Comments: Diminished bilaterally  Chest:      Chest wall: No tenderness. Abdominal:      General: Abdomen is flat. There is no distension. Palpations: Abdomen is soft. Tenderness: There is no abdominal tenderness. There is no guarding or rebound. Musculoskeletal:         General: Normal range of motion. Cervical back: Normal range of motion and neck supple. No rigidity. No muscular tenderness. Right lower leg: No edema. Left lower leg: No edema. Lymphadenopathy:      Cervical: No cervical adenopathy. Skin:     General: Skin is warm and dry. Capillary Refill: Capillary refill takes less than 2 seconds. Coloration: Skin is not jaundiced. Neurological:      General: No focal deficit present. Mental Status: She is alert and oriented to person, place, and time. Psychiatric:         Mood and Affect: Mood normal.         Behavior: Behavior normal.           MEDICAL DECISION MAKING   Initial Assessment:  This is a 79-year-old female with past medical history of CHF, CAD, CKD diabetes mellitus and hypertension presenting with fatigue over the past 2 weeks has been progressively worsening with only a few days of improvement initially. She also has some seasonal coughing however describes being at her baseline. Also describes having some fever and chills the past few days. However this a.m. states when she was trying to move around walk around she got increasing fatigue. She was hypoxic upon arrival here in the emergency department and 86% on room air requiring supplemental oxygenation. Differential Diagnosis Included but not limited to: Pneumonia, COVID-19, viral illness, CHF exacerbation, pleural effusions, pulmonary embolism, malignancy, pulmonary edema    MDM:   Patient signs and symptoms are consistent with COVID-19 infection which she has positive for. She is unvaccinated. She will be given Decadron. She is requiring supplemental oxygenation will need to be admitted. She has no significant pedal edema, she does not have any rales on exam.  Hospitalist was contacted for admission has agreed to accept patient. She had a finding of a right upper lobe mass on her chest x-ray. That was recommended for further imaging. CTA of the chest was ordered for further evaluation.         ED RESULTS   Laboratory results:  Labs Reviewed   COVID-19, RAPID - Abnormal; Notable for the following components:       Result Value    SARS-CoV-2, NAAT DETECTED (*)     All other components within normal limits   BASIC METABOLIC PANEL W/ REFLEX TO MG FOR LOW K - Abnormal; Notable for the following components:    Sodium 127 (*)     Chloride 92 (*)     CO2 21 (*)     Glucose 229 (*)     All other components within normal limits   PROCALCITONIN - Abnormal; Notable for the following components:    Procalcitonin 0.19 (*)     All other components within normal limits   LACTATE DEHYDROGENASE - Abnormal; Notable for the following components:     (*)     All other components within normal limits   FERRITIN - Abnormal; Notable for the following components:    Ferritin 981 (*)     All other components within normal limits   GLOMERULAR FILTRATION RATE, ESTIMATED - Abnormal; Notable for the following components:    Est, Glom Filt Rate 53 (*)     All other components within normal limits   OSMOLALITY - Abnormal; Notable for the following components:    Osmolality Calc 264.4 (*)     All other components within normal limits   CULTURE, BLOOD 1   CULTURE, BLOOD 2   CBC WITH AUTO DIFFERENTIAL   HEPATIC FUNCTION PANEL   TROPONIN   BRAIN NATRIURETIC PEPTIDE   LACTATE, SEPSIS   ANION GAP   URINE RT REFLEX TO CULTURE   LACTATE, SEPSIS       Radiologic studies results:  XR CHEST PORTABLE   Final Result      1. A 4.9 x 3.3 cm right upper lobe mass is seen along the right paratracheal region. A CT of the chest, abdomen, and pelvis with contrast is recommended for further evaluation. 2. Other nodular opacities are noted in both lungs that felt to be related to the underlying lung mass. 3. Mild increase in pulmonary interstitial markings. **This report has been created using voice recognition software. It may contain minor errors which are inherent in voice recognition technology. **      Final report electronically signed by Dr Arturo Kennedy on 10/21/2021 11:58 AM      CTA CHEST W [address]    (Results Pending)       ED Medications administered this visit:   Medications   0.9 % sodium chloride bolus (has no administration in time range)   Dexamethasone Sodium Phosphate injection 10 mg (has no administration in time range)         ED COURSE     ED Course as of Oct 21 1233   Thu Oct 21, 2021   1220 SARS-CoV-2, NAAT(!!): DETECTED [AL]   1220 Sodium(!): 127 [AL]   1220 Chloride(!): 92 [AL]   1220 CO2(!): 21 [AL]   1220 Glucose(!): 229 [AL]   1221 \"IMPRESSION:     1. A 4.9 x 3.3 cm right upper lobe mass is seen along the right paratracheal region.  A CT of the chest, abdomen, and pelvis with contrast is recommended for further evaluation.     2. Other nodular opacities are noted in both lungs that felt to be related to the underlying lung mass.     3. Mild increase in pulmonary interstitial markings. \"   XR CHEST PORTABLE [AL]   5606 Within  normal limits   Hepatic Function Panel:    Albumin 3.5   Bilirubin 0.3   Bilirubin, Direct <0.2   Alk Phos 71   AST 36   ALT 12   Total Protein 7.0 [AL]   1221 Anion Gap: 14.0 [AL]   1221 Osmolality Calc(!): 264.4 [AL]   1226 Hospitalist was contacted for admission    [AL]      ED Course User Index  [AL] Wendi Davenport MD         MEDICATION CHANGES     New Prescriptions    No medications on file         FINAL DISPOSITION     Final diagnoses:   COVID-19   Acute respiratory failure with hypoxia (HCC)   Generalized weakness   Hyponatremia     Condition: condition: fair  Dispo: Admit to telemetry      This transcription was electronically signed. Parts of this transcriptions may have been dictated by use of voice recognition software and electronically transcribed, and parts may have been transcribed with the assistance of an ED scribe. The transcription may contain errors not detected in proofreading. Please refer to my supervising physician's documentation if my documentation differs.     Electronically Signed: Wendi Davenport MD, 10/21/21, 12:33 PM         Wendi Davenport MD  Resident  10/21/21 2240

## 2021-10-21 NOTE — ACP (ADVANCE CARE PLANNING)
Advance Care Planning     Advance Care Planning Activator (Inpatient)  Conversation Note      Date of ACP Conversation: 10/21/2021     Conversation Conducted with: Patient with Decision Making Capacity    ACP Activator: Kimberly Robins RN, BSN, [facility]      Health Care Decision Maker:     Current Designated Health Care Decision Maker: Wants granddeana Kearns to make decisions, [phone]. See below    Today we discussed that her children are legal next of kin but with her desire to have a grandson  be the decision maker, we would need to complete a HCPOA to name him. She is agreeable after she can obtain his address after 4pm    Care Preferences    Ventilation: \"If you were in your present state of health and suddenly became very ill and were unable to breathe on your own, what would your preference be about the use of a ventilator (breathing machine) if it were available to you? \"      Would the patient desire the use of ventilator (breathing machine)?: Undecided, will accept advice from the provider    \"If your health worsens and it becomes clear that your chance of recovery is unlikely, what would your preference be about the use of a ventilator (breathing machine) if it were available to you? \"     Would the patient desire the use of ventilator (breathing machine)?: No      Resuscitation  \"CPR works best to restart the heart when there is a sudden event, like a heart attack, in someone who is otherwise healthy. Unfortunately, CPR does not typically restart the heart for people who have serious health conditions or who are very sick. \"    \"In the event your heart stopped as a result of an underlying serious health condition, would you want attempts to be made to restart your heart (answer \"yes\" for attempt to resuscitate) or would you prefer a natural death (answer \"no\" for do not attempt to resuscitate)? \" yes       [x] Yes   [] No   Educated Patient / [place] [facility] regarding differences between Advance Directives and portable DNR orders. I mentioned that should she want a DNR, I would assist with getting it signed for her. Length of ACP Conversation in minutes:  26    Conversation Outcomes:  [x] ACP discussion completed  [x] Existing advance directive reviewed with patient; no changes to patient's previously recorded wishes  [] New Advance Directive completed  [] Portable Do Not Rescitate prepared for Provider review and signature  [] POLST/POST/MOLST/MOST prepared for Provider review and signature      Follow-up plan:    [] Schedule follow-up conversation to continue planning  [] Referred individual to Provider for additional questions/concerns   [] Advised patient/agent/surrogate to review completed ACP document and update if needed with changes in condition, patient preferences or care setting    [x] This note routed to one or more involved healthcare providers, PCP     Patient seen for ACP discussion in ED. As above, we will assist with HCPOA document this admit. She is requiring oxygen at this time per nasal cannula. Patient has not thought about the use of a ventilator and wanted to consider that option. She replied quickly that she would like CPR. I encouraged her to follow up with her PCP, as statistically it is not favorable for her age and medical history, but conversation was interrupted by lab, x-ray, and medical team a few times. She is agreeable to speak with PCP about this. 1715:granddeana's phone number updated in Epic and address added for HCPOA document.   Patient moved upstairs

## 2021-10-22 NOTE — FLOWSHEET NOTE
Advance Care Planning     Advance Care Planning Inpatient Note  [facility] Department    Today's Date: 10/21/2021  Unit: [facility]    Received request from IDT Member. Upon review of chart and communication with care team, patient's decision making abilities are not in question. . Patient was/were present in the room during visit. Goals of ACP Conversation:  Discuss advance care planning documents    Health Care Decision Makers:       Primary Decision Maker: Cliff Garduno - [phone]    Summary:    Met with ACP activator in ED. Wanted holland Dum[place] as [place]. Patient tired. Asked me to fill out the POA and have someone stop by and help her complete it tomorrow. Advance Care Planning Documents (Patient Wishes):  None     Assessment:  See above    Interventions:   Deferred conversation as patient not interested in completing an advance directive at this time  Care Preferences Communicated:   No  Completed in ED with ACP.      Outcomes/Plan:  Will complete tomorrow    Electronically signed by Diane Pearson on 10/21/2021 at 9:57 PM

## 2021-10-22 NOTE — PROGRESS NOTES
Hospitalist Progress Note    Patient:  Aron Matamoros    YOB: 1940  Unit/Bed:8B-22/022-A  MRN: [id number]    Acct: [de-identified]   PCP: CESAR Fernandez CNP    Date of Admission: 10/21/2021      Assessment/Plan:    Acute respiratory failure hypoxemic     Oxygen saturations below 90% at ED arriving, currently requiring oxygen cannula 2 L. No previous history of COPD/asthma. Radiological evidence of any lung parenchyma compromise. Considering COVID-19 rapid test positive Initial steroid charge was administered in the emergency department was continued during admission.      COVID-19 pneumonia     No additional respiratory symptoms apart of shortness of breath, chest x-ray with typical findings of peripheral compromise however showing masslike image suggestive of neoplasia, CT chest scan ruled out pulmonary embolism, previous finding of neoplasia corresponds more than chronic process related with previous multiple pneumonias. A follow-up for pulmonology will be ordered after discharge. Procalcitonin initially was a slightly positive however new procalcitonin came elevated reason why we started antibiotic therapy; and with anaphylactic reaction to cephalosporins, antibiotic therapy alternative ordered, Carbapenem.     COVID-19 infection moderate risk     Given by low oxygen saturations, 3 risk factors associated, elevated inflammatory reactants, LDH, ferritin, lactate. Daily steroids were prescribed, patient qualifies for baricitinib which is currently receiving.     Dehydration/electrolyte imbalance     Given by fever and low oral intake. Moderate hyponatremia associated, previous sodium values were also low. Initial IV fluid reposition patient showed normalization in electrolytes. · Chronic Conditions (reviewed and stable unless otherwise stated)  I  ? Hypertensive heart disease with CHF  ? Type 2 diabetes  ? Dyslipidemia  ? Takotsubo cardiomyopathy  ?  CAD, last Cath Lab 50% RCA            Expected discharge date:  10/25/2021    Disposition:   [] Home  [] TCU  [] Rehab  [] Psych  [] SNF  [] [place]  [] Other-    ===================================================================      Chief Complaint:  Pritesh Burns is a 79 y.o. female with PMHx of hypertension, diabetes, hyperlipidemia, CHF, CAD, hypothyroidism who presents to [address] [place] [address] with 2 weeks of weakness fatigue and low energy, today in the morning patient refers shortness of breath no fever reason why she called EMS who found her with oxygen saturations below 90 requiring oxygen cannula 2 L/min. Patient also refers that fatigue has been worsening associated today morning with shortness of breath, patient denies chest pain no abdominal pain no diarrhea no urinary symptoms no headache or lightheadedness.       ED course: Patient was placed in oxygen cannula 2 L initial laboratory test were drawn, patient with no COVID-19 vaccine, rapid Covid test came positive chest x-ray shows infiltrates suggestive of COVID-19 pneumonia reason why steroid therapy was initiated as well has admission, patient with hypoxic respiratory failure admitted for COVID-19 pneumonia.         Hospital Course: Patient showed elevation in acute reactants with increase in procalcitonin reason why antibiotic therapy was initiated. Electrolyte imbalance was resolved after initial IV fluid reposition. Probable lung neoplasia/abdominal metastasis was ruled out. Subjective (past 24 hours), mild shortness of breath, fatigue and low energy. No fever, no chest pain, no abdominal pain,. Is a still in oxygen cannula.       Medications:  Reviewed    Infusion Medications    dextrose       Scheduled Medications    insulin lispro  0-12 Units SubCUTAneous TID WC    insulin lispro  0-6 Units SubCUTAneous Nightly    azithromycin  500 mg IntraVENous Q24H    meropenem  1,000 mg IntraVENous Q8H    amLODIPine  5 mg Oral Daily    aspirin  81 mg Oral Daily    levothyroxine  75 mcg Oral Daily    metoprolol succinate  50 mg Oral Daily    atorvastatin  10 mg Oral Daily    sodium chloride flush  5-40 mL IntraVENous 2 times per day    enoxaparin  30 mg SubCUTAneous Q12H    dexamethasone  6 mg Oral Daily    baricitinib  2 mg Oral Daily     PRN Meds: glucose, dextrose, glucagon (rDNA), dextrose, ondansetron **OR** ondansetron, polyethylene glycol, acetaminophen **OR** acetaminophen      ROS: reviewed from prior note, full ROS unchanged unless otherwise stated in hospital course/subjective portion. Intake/Output Summary (Last 24 hours) at 10/22/2021 1658  Last data filed at 10/21/2021 2019  Gross per 24 hour   Intake --   Output 250 ml   Net -250 ml       Exam:  BP (!) 117/46   Pulse 66   Temp 98.5 °F (36.9 °C) (Oral)   Resp 16   Ht 5' 2\" (1.575 m)   Wt 190 lb (86.2 kg)   SpO2 92%   BMI 34.75 kg/m²     General appearance: No distress, well developed, appears stated age. Eyes:  PERRL. Conjunctivae/corneas clear. HENT: Head normal appearing. Nares normal. Oral mucosa moist.  Hearing intact. Neck: Supple, with full range of motion. Trachea midline. No gross JVD appreciated. Respiratory:  Normal effort. Clear to auscultation, without rales or wheezes or rhonchi. Cardiovascular: Normal rate, regular rhythm with normal S1/S2 without murmurs. No lower extremity edema. Abdomen: Soft, non-tender, non-distended with normal bowel sounds. Musculoskeletal: No joint swelling or tenderness. Normal tone. No abnormal movements. Skin: Warm and dry. No rashes or lesions. Neurologic:  No focal sensory/motor deficits in the upper or lower extremities. Cranial nerves:  grossly non-focal 2-12. Psychiatric: Alert and oriented, normal insight and thought content. Capillary Refill: Brisk,< 3 seconds. Peripheral Pulses: +2 palpable, equal bilaterally.        Labs:   Recent Labs     10/21/21  1130 10/22/21  0722   WBC 6.5 3.0*   HGB 14.4 14.0   HCT 41.7 42.8    186     Recent Labs     10/21/21  1130 10/22/21  0722   * 133*   K 4.1 4.4   CL 92* 97*   CO2 21* 25   BUN 18 20   CREATININE 1.0 0.9   CALCIUM 8.9 9.2     Recent Labs     10/21/21  1130 10/22/21  0722   AST 36 23   ALT 12 9*   BILIDIR <0.2  --    BILITOT 0.3 0.2*   ALKPHOS 71 66     No results for input(s): INR in the last 72 hours. No results for input(s): Ronak Mic in the last 72 hours. Recent Labs     10/21/21  1130 10/21/21  1410   PROCAL 0.19* 0.23*      Lab Results   Component Value Date    NITRU NEGATIVE 10/21/2021    WBCUA 25-50 10/21/2021    BACTERIA FEW 10/21/2021    RBCUA 0-2 10/21/2021    BLOODU MODERATE 10/21/2021    GLUCOSEU 250 10/21/2021       Radiology (48 hours):  CTA CHEST W WO CONTRAST    Result Date: 10/21/2021   1. No evidence of pulmonary embolus. 2. Diffuse patchy groundglass opacities more pronounced at the periphery and lung bases as evidence for atypical infectious/inflammatory process to include viral pneumonia. 3. Masslike opacity in the right upper lobe anteriorly with linear and patchy opacities extending to the pleura. This likely represents the same infectious/inflammatory process as the other groundglass opacities. However, underlying neoplasm can't be excluded. Recommend short-term follow-up imaging to resolution. **This report has been created using voice recognition software. It may contain minor errors which are inherent in voice recognition technology. ** Final report electronically signed by Dr. Acosta Blood MD on 10/21/2021 2:10 PM    CT ABDOMEN PELVIS W IV CONTRAST Additional Contrast? None    Result Date: 10/21/2021   1. No evidence of acute intra-abdominal or intrapelvic abnormality. 2. Multiple renal cystic lesions, not fully characterized on this exam. A cystic lesion at the inferior pole of the left kidney adjacent fat stranding towards follow-up with multiphase CT or MRI to exclude an enhancing component.  3. Prominent colonic diverticulosis. 4. Hepatic steatosis. 5. Small to moderate hiatal hernia. **This report has been created using voice recognition software. It may contain minor errors which are inherent in voice recognition technology. ** Final report electronically signed by Dr. Malcolm Mooney MD on 10/21/2021 2:16 PM    XR CHEST PORTABLE    Result Date: 10/21/2021  1. A 4.9 x 3.3 cm right upper lobe mass is seen along the right paratracheal region. A CT of the chest, abdomen, and pelvis with contrast is recommended for further evaluation. 2. Other nodular opacities are noted in both lungs that felt to be related to the underlying lung mass. 3. Mild increase in pulmonary interstitial markings. **This report has been created using voice recognition software. It may contain minor errors which are inherent in voice recognition technology. ** Final report electronically signed by Dr Juan Antonio Root on 10/21/2021 11:58 AM       DVT prophylaxis:    [x] Lovenox  [] SCDs  [] SQ Heparin  [] Encourage ambulation   [] Already on Anticoagulation       Diet: ADULT DIET; Regular; 3 carb choices (45 gm/meal);  Low Sodium (2 gm)  Code Status: Full Code  PT/OT: Ordered  Tele: Yes continues  IVF: Not required    Electronically signed by Will Brennan MD on 10/22/2021 at 4:58 PM

## 2021-10-22 NOTE — PROGRESS NOTES
Pt ambulated to the restroom with stand by assist did very well. States she drives does not use any assistive devices.

## 2021-10-23 NOTE — PROGRESS NOTES
Hospitalist Progress Note      Patient:  Cristina Goodman    Unit/Bed:8B-22/022-A  YOB: 1940  MRN: [id number]   Acct: [de-identified]   PCP: CESAR Gillis CNP  Date of Admission: 10/21/2021    Assessment/Plan:    1. Acute hypoxic respiratory failure  2. COVID-19 pneumonia:  a. Covid directed therapy:  i. Decadron 10 mg on 10/21.  6 mg on 10/22-current  ii. Baricitinib 10/21-current  b. Incentive spirometer and Acapella valve  c. Currently on 3 L via nasal cannula. We will wean as tolerated. 3. Concern for superimposed bacterial pneumonia  a. Continue with empiric azithromycin and Merrem given severe allergic reaction to cephalosporins. b. Mild elevation of pro-Jadiel.  We will likely treat with a short course of antibiotics. 4. History of HFpEF, not in acute exacerbation:   a. Continue home Norvasc, aspirin, Lipitor, metoprolol  5. Hyperlipidemia:   a. Continue Lipitor  6. Hypertension:   a. Continue home medications  7. Diabetes mellitus type 2:  a. Patient with persistent hyperglycemia. We will add 10 units of basal insulin today. b. Continue SSI and Accu-Cheks. 8. Hypothyroidism:   a. Continue home Synthroid      Disposition: Anticipate discharge home in 48 to 72 hours. May require supplemental oxygen. Chief Complaint: Shortness of breath    Hospital Course:    10/21: Patient presents to the hospital with complaints of shortness of breath. Found to be COVID-19 positive with hypoxia. Started on empiric antibiotics with Merrem and azithromycin based on severe allergy history. Also started on IV Decadron and baricitinib. Patient requiring nasal cannula. Subjective (past 24 hours):   Patient states she feels pretty well today. Denies any significant shortness of breath. Does admit to a mild cough. No acute events overnight. Patient denies any chest pain or palpitations.       Past medical history, family history, social history and allergies reviewed again and is unchanged since admission. ROS (12 point review of systems completed. Pertinent positives noted. Otherwise ROS is negative)     Medications:  Reviewed    Infusion Medications    dextrose       Scheduled Medications    insulin lispro  0-12 Units SubCUTAneous TID WC    insulin lispro  0-6 Units SubCUTAneous Nightly    azithromycin  500 mg IntraVENous Q24H    meropenem  1,000 mg IntraVENous Q8H    amLODIPine  5 mg Oral Daily    aspirin  81 mg Oral Daily    levothyroxine  75 mcg Oral Daily    metoprolol succinate  50 mg Oral Daily    atorvastatin  10 mg Oral Daily    sodium chloride flush  5-40 mL IntraVENous 2 times per day    enoxaparin  30 mg SubCUTAneous Q12H    dexamethasone  6 mg Oral Daily    baricitinib  2 mg Oral Daily     PRN Meds: glucose, dextrose, glucagon (rDNA), dextrose, ondansetron **OR** ondansetron, polyethylene glycol, acetaminophen **OR** acetaminophen      Intake/Output Summary (Last 24 hours) at 10/23/2021 1348  Last data filed at 10/23/2021 1234  Gross per 24 hour   Intake 1350.9 ml   Output 0 ml   Net 1350.9 ml       Diet:  ADULT DIET; Regular; 3 carb choices (45 gm/meal); Low Sodium (2 gm)    Exam:  BP (!) 120/55   Pulse 59   Temp 98.4 °F (36.9 °C) (Oral)   Resp 16   Ht 5' 2\" (1.575 m)   Wt 190 lb (86.2 kg)   SpO2 91%   BMI 34.75 kg/m²   General appearance: No apparent distress, appears stated age and cooperative. HEENT: Pupils equal, round, and reactive to light. Conjunctivae/corneas clear. Neck: Supple, with full range of motion. No jugular venous distention. Trachea midline. Respiratory:  Normal respiratory effort. Clear to auscultation, bilaterally without Rales/Wheezes/Rhonchi. Cardiovascular: Regular rate and rhythm with normal S1/S2 without murmurs, rubs or gallops. Abdomen: Soft, non-tender, non-distended with normal bowel sounds. Musculoskeletal: passive and active ROM x 4 extremities.   Skin: Skin color, texture, turgor normal.  No rashes or lesions. Neurologic:  Neurovascularly intact without any focal sensory/motor deficits. Cranial nerves: II-XII intact, grossly non-focal.  Psychiatric: Alert and oriented, thought content appropriate, normal insight  Capillary Refill: Brisk,< 3 seconds   Peripheral Pulses: +2 palpable, equal bilaterally     Labs:   Recent Labs     10/21/21  1130 10/22/21  0722   WBC 6.5 3.0*   HGB 14.4 14.0   HCT 41.7 42.8    186     Recent Labs     10/21/21  1130 10/22/21  0722   * 133*   K 4.1 4.4   CL 92* 97*   CO2 21* 25   BUN 18 20   CREATININE 1.0 0.9   CALCIUM 8.9 9.2     Recent Labs     10/21/21  1130 10/22/21  0722   AST 36 23   ALT 12 9*   BILIDIR <0.2  --    BILITOT 0.3 0.2*   ALKPHOS 71 66     No results for input(s): INR in the last 72 hours. No results for input(s): Marisela Risk in the last 72 hours. Microbiology:    Blood culture #1:   Lab Results   Component Value Date    BC No growth-preliminary  10/21/2021       Blood culture #2:No results found for: Clarnce Lax    Organism:  Lab Results   Component Value Date    ORG Growth of Contaminants 10/21/2021       No results found for: LABGRAM    MRSA culture only:No results found for: [facility]    Urine culture: No results found for: LABURIN    Respiratory culture: No results found for: CULTRESP    Aerobic and Anaerobic :  No results found for: LABAERO  No results found for: LABANAE    Urinalysis:      Lab Results   Component Value Date    NITRU NEGATIVE 10/21/2021    WBCUA 25-50 10/21/2021    BACTERIA FEW 10/21/2021    RBCUA 0-2 10/21/2021    BLOODU MODERATE 10/21/2021    GLUCOSEU 250 10/21/2021       Radiology:  CTA CHEST W WO CONTRAST   Final Result       1. No evidence of pulmonary embolus. 2. Diffuse patchy groundglass opacities more pronounced at the periphery and lung bases as evidence for atypical infectious/inflammatory process to include viral pneumonia.    3. Masslike opacity in the right upper lobe anteriorly with linear and patchy opacities extending to the pleura. This likely represents the same infectious/inflammatory process as the other groundglass opacities. However, underlying neoplasm can't be    excluded. Recommend short-term follow-up imaging to resolution. **This report has been created using voice recognition software. It may contain minor errors which are inherent in voice recognition technology. **      Final report electronically signed by Dr. Bhupendra Knowles MD on 10/21/2021 2:10 PM      CT ABDOMEN PELVIS W IV CONTRAST Additional Contrast? None   Final Result       1. No evidence of acute intra-abdominal or intrapelvic abnormality. 2. Multiple renal cystic lesions, not fully characterized on this exam. A cystic lesion at the inferior pole of the left kidney adjacent fat stranding towards follow-up with multiphase CT or MRI to exclude an enhancing component. 3. Prominent colonic diverticulosis. 4. Hepatic steatosis. 5. Small to moderate hiatal hernia. **This report has been created using voice recognition software. It may contain minor errors which are inherent in voice recognition technology. **      Final report electronically signed by Dr. Bhupendra Knowles MD on 10/21/2021 2:16 PM      XR CHEST PORTABLE   Final Result      1. A 4.9 x 3.3 cm right upper lobe mass is seen along the right paratracheal region. A CT of the chest, abdomen, and pelvis with contrast is recommended for further evaluation. 2. Other nodular opacities are noted in both lungs that felt to be related to the underlying lung mass. 3. Mild increase in pulmonary interstitial markings. **This report has been created using voice recognition software. It may contain minor errors which are inherent in voice recognition technology. **      Final report electronically signed by Dr Jacque Cade on 10/21/2021 11:58 AM        CTA CHEST W WO CONTRAST    Result Date: 10/21/2021  PROCEDURE: CTA CHEST W WO CONTRAST CLINICAL INFORMATION: right upper lobe mass, hypoxic. Shortness of breath. COMPARISON: Chest radiograph from the same date. TECHNIQUE: 3 mm axial images were obtained through the chest during fast bolus administration of 80 mL Isovue-370 injected in the right forearm. A non-contrast localizer was obtained. 3D reconstructions were performed on the scanner to include MIP coronal and sagittal images through the chest. All CT scans at this facility use dose modulation, iterative reconstruction, and/or weight-based dosing when appropriate to reduce radiation dose to as low as reasonably achievable. FINDINGS:  There is good contrast bolus within the pulmonary arteries, adequate for evaluation to the subsegmental level. There are no focal filling defects within the pulmonary arteries to suggest pulmonary embolus. There is mild mural calcification in the aortic  arch. No aneurysm or dissection is identified. No axillary, mediastinal or hilar lymphadenopathy is identified. There is an elongated irregular masslike density in the anterior aspect of the right upper lobe with adjacent linear and patchy opacities extending to the pleura. This appears to correspond to the masslike density on previous chest radiograph. There are prominent patchy groundglass opacities in both lungs at the periphery. There are more confluent groundglass opacities at the posterior lung bases, right greater than left. There is a small-to-moderate hiatal hernia. There are calcified granulomas in the spleen. There is a dextrocurvature of the thoracic spine. There are moderate degenerative changes of the spine. No suspicious osseous lesion is identified. 1. No evidence of pulmonary embolus. 2. Diffuse patchy groundglass opacities more pronounced at the periphery and lung bases as evidence for atypical infectious/inflammatory process to include viral pneumonia.  3. Masslike opacity in the right upper lobe anteriorly with linear and patchy opacities extending to the pleura. This likely represents the same infectious/inflammatory process as the other groundglass opacities. However, underlying neoplasm can't be excluded. Recommend short-term follow-up imaging to resolution. **This report has been created using voice recognition software. It may contain minor errors which are inherent in voice recognition technology. ** Final report electronically signed by Dr. Dulce Maria Cho MD on 10/21/2021 2:10 PM    CT ABDOMEN PELVIS W IV CONTRAST Additional Contrast? None    Result Date: 10/21/2021  PROCEDURE: CT ABDOMEN PELVIS W IV CONTRAST CLINICAL INFORMATION: Lung Mass . COMPARISON: None. TECHNIQUE: Axial 5 mm CT images were obtained through the abdomen and pelvis after the administration of 80 mL Isovue-370 injected in the right forearm with sagittal and coronal reconstructions. All CT scans at this facility use dose modulation, iterative reconstruction, and/or weight-based dosing when appropriate to reduce radiation dose to as low as reasonably achievable. FINDINGS:  There is a small to moderate-sized hiatal hernia. The liver is diffusely hypodense without focal lesion identified. The gallbladder is surgically absent. The extrahepatic bile duct is prominent throughout possibly on the basis of postsurgical dilatation. Adrenal glands are unremarkable. There are multiple hypodense cystic lesions of both kidneys which are not fully characterized on this exam. There is a irregular hypodense lesion at the inferior pole of left kidney with adjacent stranding in the fat. There are calcified granulomas in the spleen. The pancreas is partially fatty replaced. No retroperitoneal or mesenteric lymphadenopathy is identified. There are numerous diverticula within the large bowel without definite adjacent inflammation to suggest diverticulitis. Small bowel appears within normal limits. The unopacified bladder is unremarkable.  The uterus appears to be surgically absent. No free  fluid is identified. There are mild-to-moderate degenerative changes of the lumbar spine. No suspicious osseous lesion is identified. 1. No evidence of acute intra-abdominal or intrapelvic abnormality. 2. Multiple renal cystic lesions, not fully characterized on this exam. A cystic lesion at the inferior pole of the left kidney adjacent fat stranding towards follow-up with multiphase CT or MRI to exclude an enhancing component. 3. Prominent colonic diverticulosis. 4. Hepatic steatosis. 5. Small to moderate hiatal hernia. **This report has been created using voice recognition software. It may contain minor errors which are inherent in voice recognition technology. ** Final report electronically signed by Dr. Christina Key MD on 10/21/2021 2:16 PM    XR CHEST PORTABLE    Result Date: 10/21/2021  PROCEDURE: XR CHEST PORTABLE CLINICAL INFORMATION: Shortness of breath COMPARISON: None TECHNIQUE: AP portable chest radiograph performed. FINDINGS: There is a 4.9 x 3.3 cm masslike abnormality in the right paratracheal region. Increase in pulmonary markings are seen. There is vague nodular opacities in both lungs more on the right. Cardiac silhouette is not enlarged. No pleural effusion. No pneumothorax. No acute bony abnormality. Scoliosis. Degenerative changes of the thoracic spine. The bones appear demineralized. 1. A 4.9 x 3.3 cm right upper lobe mass is seen along the right paratracheal region. A CT of the chest, abdomen, and pelvis with contrast is recommended for further evaluation. 2. Other nodular opacities are noted in both lungs that felt to be related to the underlying lung mass. 3. Mild increase in pulmonary interstitial markings. **This report has been created using voice recognition software. It may contain minor errors which are inherent in voice recognition technology. ** Final report electronically signed by Dr Poli Bass on 10/21/2021 11:58 AM      Electronically signed by Jt Robles DO on 10/23/2021 at 1:48 PM

## 2021-10-23 NOTE — PROGRESS NOTES
Grandson visiting patient earlier this shift. Writer called grandson at this time, update given. All questions answered.

## 2021-10-24 NOTE — PROGRESS NOTES
Hospitalist Progress Note      Patient:  Renny [facility]    Unit/Bed:8B-22/022-A  YOB: 1940  MRN: [id number]   Acct: [de-identified]   PCP: CESAR Roberson CNP  Date of Admission: 10/21/2021    Assessment/Plan:    1. Acute hypoxic respiratory failure  2. COVID-19 pneumonia:  a. Covid directed therapy:  i. Decadron 10 mg on 10/21.  6 mg on 10/22-current  ii. Baricitinib 10/21-current  b. Incentive spirometer and Acapella valve  c. Patient with significant worsening of her oxygen status earlier this morning. Quickly uptitrated from 6 L via nasal cannula to 100% FiO2 on high flow nasal cannula. d. Repeat chest x-ray completed which showed worsening of the bibasilar infiltrates. No evidence of effusions.  e. On clinical exam, patient with bibasilar crackles as well as trace edema in her lower extremities. We will give 40 IV Lasix to try to improve her respiratory status. f. BiPAP if needed. g. CT angiogram completed on 10/21 with no evidence of PE.  3. Concern for superimposed bacterial pneumonia  a. Continue with empiric azithromycin and Merrem given severe allergic reaction to cephalosporins. 4. History of HFpEF, not in acute exacerbation:   a. Continue home Norvasc, aspirin, Lipitor, metoprolol  5. Hyperlipidemia:   a. Continue Lipitor  6. Hypertension:   a. Continue home medications  7. Diabetes mellitus type 2:  a. Patient with persistent hyperglycemia. We will add 10 units of basal insulin today. b. Continue SSI and Accu-Cheks. 8. Hypothyroidism:   a. Continue home Synthroid      Disposition: Anticipate discharge home in 48 to 72 hours. May require supplemental oxygen. Chief Complaint: Shortness of breath    Hospital Course:    10/21: Patient presents to the hospital with complaints of shortness of breath. Found to be COVID-19 positive with hypoxia.   Started on empiric antibiotics with Merrem and azithromycin based on severe allergy history. Also started on IV Decadron and baricitinib. Patient requiring nasal cannula. Subjective (past 24 hours):   Early this morning, the patient was noted to have worsening of her oxygen saturation. Over period of a few hours, she was titrated from 6 L via regular nasal cannula to 100% FiO2 on high flow nasal cannula. Patient mitts to worsening shortness of breath. Denies any chest pains, palpitations, or fever/chills. Past medical history, family history, social history and allergies reviewed again and is unchanged since admission. ROS (12 point review of systems completed. Pertinent positives noted. Otherwise ROS is negative)     Medications:  Reviewed    Infusion Medications    dextrose       Scheduled Medications    dexamethasone  20 mg IntraVENous Daily    Followed by   Obi Frausto ON 10/29/2021] dexamethasone  10 mg IntraVENous Daily    insulin glargine  10 Units SubCUTAneous Nightly    insulin lispro  0-12 Units SubCUTAneous TID WC    insulin lispro  0-6 Units SubCUTAneous Nightly    azithromycin  500 mg IntraVENous Q24H    meropenem  1,000 mg IntraVENous Q8H    amLODIPine  5 mg Oral Daily    aspirin  81 mg Oral Daily    levothyroxine  75 mcg Oral Daily    metoprolol succinate  50 mg Oral Daily    atorvastatin  10 mg Oral Daily    sodium chloride flush  5-40 mL IntraVENous 2 times per day    enoxaparin  30 mg SubCUTAneous Q12H    baricitinib  2 mg Oral Daily     PRN Meds: albuterol sulfate HFA, glucose, dextrose, glucagon (rDNA), dextrose, ondansetron **OR** ondansetron, polyethylene glycol, acetaminophen **OR** acetaminophen      Intake/Output Summary (Last 24 hours) at 10/24/2021 1153  Last data filed at 10/24/2021 0830  Gross per 24 hour   Intake 1290.9 ml   Output 0 ml   Net 1290.9 ml       Diet:  ADULT DIET; Regular; 3 carb choices (45 gm/meal);  Low Sodium (2 gm)    Exam:  /60   Pulse 82   Temp 97.5 °F (36.4 °C) (Oral)   Resp 24   Ht 5' 2\" (1.575 m) results found for: LABAERO  No results found for: LABANAE    Urinalysis:      Lab Results   Component Value Date    NITRU NEGATIVE 10/21/2021    WBCUA 25-50 10/21/2021    BACTERIA FEW 10/21/2021    RBCUA 0-2 10/21/2021    BLOODU MODERATE 10/21/2021    GLUCOSEU 250 10/21/2021       Radiology:  XR CHEST PORTABLE   Final Result   Interval worsening of patchy opacities at the lung bases. **This report has been created using voice recognition software. It may contain minor errors which are inherent in voice recognition technology. **      Final report electronically signed by Dr. Ashwin Wallace MD on 10/24/2021 8:11 AM      CTA CHEST W WO CONTRAST   Final Result       1. No evidence of pulmonary embolus. 2. Diffuse patchy groundglass opacities more pronounced at the periphery and lung bases as evidence for atypical infectious/inflammatory process to include viral pneumonia. 3. Masslike opacity in the right upper lobe anteriorly with linear and patchy opacities extending to the pleura. This likely represents the same infectious/inflammatory process as the other groundglass opacities. However, underlying neoplasm can't be    excluded. Recommend short-term follow-up imaging to resolution. **This report has been created using voice recognition software. It may contain minor errors which are inherent in voice recognition technology. **      Final report electronically signed by Dr. Ashwin Wallace MD on 10/21/2021 2:10 PM      CT ABDOMEN PELVIS W IV CONTRAST Additional Contrast? None   Final Result       1. No evidence of acute intra-abdominal or intrapelvic abnormality. 2. Multiple renal cystic lesions, not fully characterized on this exam. A cystic lesion at the inferior pole of the left kidney adjacent fat stranding towards follow-up with multiphase CT or MRI to exclude an enhancing component. 3. Prominent colonic diverticulosis. 4. Hepatic steatosis.    5. Small to moderate hiatal hernia. **This report has been created using voice recognition software. It may contain minor errors which are inherent in voice recognition technology. **      Final report electronically signed by Dr. Sourav Guy MD on 10/21/2021 2:16 PM      XR CHEST PORTABLE   Final Result      1. A 4.9 x 3.3 cm right upper lobe mass is seen along the right paratracheal region. A CT of the chest, abdomen, and pelvis with contrast is recommended for further evaluation. 2. Other nodular opacities are noted in both lungs that felt to be related to the underlying lung mass. 3. Mild increase in pulmonary interstitial markings. **This report has been created using voice recognition software. It may contain minor errors which are inherent in voice recognition technology. **      Final report electronically signed by Dr Enrique Cannon on 10/21/2021 11:58 AM        CTA CHEST W WO CONTRAST    Result Date: 10/21/2021  PROCEDURE: CTA CHEST W WO CONTRAST CLINICAL INFORMATION: right upper lobe mass, hypoxic. Shortness of breath. COMPARISON: Chest radiograph from the same date. TECHNIQUE: 3 mm axial images were obtained through the chest during fast bolus administration of 80 mL Isovue-370 injected in the right forearm. A non-contrast localizer was obtained. 3D reconstructions were performed on the scanner to include MIP coronal and sagittal images through the chest. All CT scans at this facility use dose modulation, iterative reconstruction, and/or weight-based dosing when appropriate to reduce radiation dose to as low as reasonably achievable. FINDINGS:  There is good contrast bolus within the pulmonary arteries, adequate for evaluation to the subsegmental level. There are no focal filling defects within the pulmonary arteries to suggest pulmonary embolus. There is mild mural calcification in the aortic  arch. No aneurysm or dissection is identified.  No axillary, mediastinal or hilar lymphadenopathy is identified. There is an elongated irregular masslike density in the anterior aspect of the right upper lobe with adjacent linear and patchy opacities extending to the pleura. This appears to correspond to the masslike density on previous chest radiograph. There are prominent patchy groundglass opacities in both lungs at the periphery. There are more confluent groundglass opacities at the posterior lung bases, right greater than left. There is a small-to-moderate hiatal hernia. There are calcified granulomas in the spleen. There is a dextrocurvature of the thoracic spine. There are moderate degenerative changes of the spine. No suspicious osseous lesion is identified. 1. No evidence of pulmonary embolus. 2. Diffuse patchy groundglass opacities more pronounced at the periphery and lung bases as evidence for atypical infectious/inflammatory process to include viral pneumonia. 3. Masslike opacity in the right upper lobe anteriorly with linear and patchy opacities extending to the pleura. This likely represents the same infectious/inflammatory process as the other groundglass opacities. However, underlying neoplasm can't be excluded. Recommend short-term follow-up imaging to resolution. **This report has been created using voice recognition software. It may contain minor errors which are inherent in voice recognition technology. ** Final report electronically signed by Dr. Nino Voss MD on 10/21/2021 2:10 PM    CT ABDOMEN PELVIS W IV CONTRAST Additional Contrast? None    Result Date: 10/21/2021  PROCEDURE: CT ABDOMEN PELVIS W IV CONTRAST CLINICAL INFORMATION: Lung Mass . COMPARISON: None. TECHNIQUE: Axial 5 mm CT images were obtained through the abdomen and pelvis after the administration of 80 mL Isovue-370 injected in the right forearm with sagittal and coronal reconstructions.  All CT scans at this facility use dose modulation, iterative reconstruction, and/or weight-based dosing when appropriate to reduce radiation dose to as low as reasonably achievable. FINDINGS:  There is a small to moderate-sized hiatal hernia. The liver is diffusely hypodense without focal lesion identified. The gallbladder is surgically absent. The extrahepatic bile duct is prominent throughout possibly on the basis of postsurgical dilatation. Adrenal glands are unremarkable. There are multiple hypodense cystic lesions of both kidneys which are not fully characterized on this exam. There is a irregular hypodense lesion at the inferior pole of left kidney with adjacent stranding in the fat. There are calcified granulomas in the spleen. The pancreas is partially fatty replaced. No retroperitoneal or mesenteric lymphadenopathy is identified. There are numerous diverticula within the large bowel without definite adjacent inflammation to suggest diverticulitis. Small bowel appears within normal limits. The unopacified bladder is unremarkable. The uterus appears to be surgically absent. No free  fluid is identified. There are mild-to-moderate degenerative changes of the lumbar spine. No suspicious osseous lesion is identified. 1. No evidence of acute intra-abdominal or intrapelvic abnormality. 2. Multiple renal cystic lesions, not fully characterized on this exam. A cystic lesion at the inferior pole of the left kidney adjacent fat stranding towards follow-up with multiphase CT or MRI to exclude an enhancing component. 3. Prominent colonic diverticulosis. 4. Hepatic steatosis. 5. Small to moderate hiatal hernia. **This report has been created using voice recognition software. It may contain minor errors which are inherent in voice recognition technology. ** Final report electronically signed by Dr. Aryan Chamberlain MD on 10/21/2021 2:16 PM    XR CHEST PORTABLE    Result Date: 10/21/2021  PROCEDURE: XR CHEST PORTABLE CLINICAL INFORMATION: Shortness of breath COMPARISON: None TECHNIQUE: AP portable chest radiograph performed. FINDINGS: There is a 4.9 x 3.3 cm masslike abnormality in the right paratracheal region. Increase in pulmonary markings are seen. There is vague nodular opacities in both lungs more on the right. Cardiac silhouette is not enlarged. No pleural effusion. No pneumothorax. No acute bony abnormality. Scoliosis. Degenerative changes of the thoracic spine. The bones appear demineralized. 1. A 4.9 x 3.3 cm right upper lobe mass is seen along the right paratracheal region. A CT of the chest, abdomen, and pelvis with contrast is recommended for further evaluation. 2. Other nodular opacities are noted in both lungs that felt to be related to the underlying lung mass. 3. Mild increase in pulmonary interstitial markings. **This report has been created using voice recognition software. It may contain minor errors which are inherent in voice recognition technology. ** Final report electronically signed by Dr Princess Triplett on 10/21/2021 11:58 AM      Electronically signed by Caitlin Olson DO on 10/24/2021 at 11:53 AM

## 2021-10-25 NOTE — PROGRESS NOTES
Maryjane Ryan 59  PHYSICAL THERAPY MISSED TREATMENT NOTE  [facility]    Date: 10/25/2021  Patient Name: Ailyn Calvin        MRN: [id number]   : 1940  (79 y.o.)  Gender: female                REASON FOR MISSED TREATMENT:  Hold treatment per nursing request.  RN reports that pt is on BiPAP and would not tolerate today. Will check back tomorrow. Jeff Singletary.  Eliecer Hernandez, Sunil Mendon [address]

## 2021-10-25 NOTE — PROGRESS NOTES
Hospitalist Progress Note      Patient:  Johanna Powell    Unit/Bed:8B-22/022-A  YOB: 1940  MRN: [id number]   Acct: [de-identified]   PCP: CESAR Johnson CNP  Date of Admission: 10/21/2021    Assessment/Plan:    1. Acute hypoxic respiratory failure  2. COVID-19 pneumonia:  a. Covid directed therapy:  i. Decadron 10 mg on 10/21.  6 mg on 10/22-current  ii. Baricitinib 10/21-current  b. Incentive spirometer and Acapella valve as able  c. Patient is BiPAP dependent at this time. Will complete CT angiogram of the chest to rule out PE, the patient did have an angiogram upon arrival to the hospital.  If unable to complete, will consider empiric anticoagulation. tian VARGHESE discussed with the patient on multiple occasions clinical decline over the previous 24 to 48 hours. She is expresses wishes to undergo intubation mechanical ventilation if needed. 3. Concern for superimposed bacterial pneumonia  a. Continue with empiric azithromycin and Merrem given severe allergic reaction to cephalosporins. 4. History of HFpEF, not in acute exacerbation:   a. Continue home Norvasc, aspirin, Lipitor, metoprolol  5. Hyperlipidemia:   a. Continue Lipitor  6. Hypertension:   a. Continue home medications  7. Diabetes mellitus type 2:  a. Blood glucose better controlled. Does have some mild hyperglycemia secondary to glucocorticoid use.  b. Continue basal bolus insulin. c. Continue SSI and Accu-Cheks. 8. Hypothyroidism:   a. Continue home Synthroid  9. Suspicious pulmonary mass  a. Patient with possible pulmonary mass in the right upper lobe of her lung. b. Apparently has been there before and is relatively stable. Will need outpatient follow-up pending resolution of acute illness. Disposition: Unknown at this time. Currently BiPAP dependent.      Chief Complaint: Shortness of breath    Hospital Course:    10/21: Patient presents to the hospital with complaints of shortness of breath. Found to be COVID-19 positive with hypoxia. Started on empiric antibiotics with Merrem and azithromycin based on severe allergy history. Also started on IV Decadron and baricitinib. Patient requiring nasal cannula. 10/24: Patient had significant worsening of her oxygenation earlier this morning. Quickly escalated from 5 L nasal cannula to a maximum high flow nasal cannula. Patient required BiPAP therapy later in the day. Essentially BiPAP dependent overnight. Subjective (past 24 hours):   Patient codie on the BiPAP this morning. Continues to have high oxygen requirements with FiO2 between 85 100%. Patient remains short of breath, but denies any chest pain, palpitations, lower extremity pain, lower extremity edema. Past medical history, family history, social history and allergies reviewed again and is unchanged since admission. ROS (12 point review of systems completed. Pertinent positives noted.  Otherwise ROS is negative)     Medications:  Reviewed    Infusion Medications    dextrose       Scheduled Medications    dexamethasone  20 mg IntraVENous Daily    Followed by   Ron Coombs ON 10/29/2021] dexamethasone  10 mg IntraVENous Daily    insulin glargine  10 Units SubCUTAneous Nightly    insulin lispro  0-12 Units SubCUTAneous TID WC    insulin lispro  0-6 Units SubCUTAneous Nightly    azithromycin  500 mg IntraVENous Q24H    meropenem  1,000 mg IntraVENous Q8H    amLODIPine  5 mg Oral Daily    aspirin  81 mg Oral Daily    levothyroxine  75 mcg Oral Daily    metoprolol succinate  50 mg Oral Daily    atorvastatin  10 mg Oral Daily    sodium chloride flush  5-40 mL IntraVENous 2 times per day    enoxaparin  30 mg SubCUTAneous Q12H    baricitinib  2 mg Oral Daily     PRN Meds: albuterol sulfate HFA, glucose, dextrose, glucagon (rDNA), dextrose, ondansetron **OR** ondansetron, polyethylene glycol, acetaminophen **OR** acetaminophen      Intake/Output Summary (Last 24 hours) at 10/25/2021 1256  Last data filed at 10/25/2021 0330  Gross per 24 hour   Intake --   Output 1000 ml   Net -1000 ml       Diet:  ADULT DIET; Regular; 3 carb choices (45 gm/meal); Low Sodium (2 gm)    Exam:  BP (!) 115/42   Pulse 73   Temp 98.3 °F (36.8 °C) (Axillary)   Resp 28   Ht 5' 2\" (1.575 m)   Wt 190 lb (86.2 kg)   SpO2 91%   BMI 34.75 kg/m²   General appearance: No apparent distress, appears stated age and cooperative. HEENT: Pupils equal, round, and reactive to light. Conjunctivae/corneas clear. Neck: Supple, with full range of motion. No jugular venous distention. Trachea midline. Respiratory: Tachypnea noted with respiratory rate between 22-25. Diminished bibasilar breath sounds. Mild bibasilar rhonchi. Cardiovascular: Regular rate and rhythm with normal S1/S2 without murmurs, rubs or gallops. Abdomen: Soft, non-tender, non-distended with normal bowel sounds. Musculoskeletal: passive and active ROM x 4 extremities. Skin: Skin color, texture, turgor normal.  No rashes or lesions. Neurologic:  Neurovascularly intact without any focal sensory/motor deficits. Cranial nerves: II-XII intact, grossly non-focal.  Psychiatric: Alert and oriented, thought content appropriate, normal insight  Capillary Refill: Brisk,< 3 seconds   Peripheral Pulses: +2 palpable, equal bilaterally     Labs:   Recent Labs     10/24/21  1004 10/25/21  0650   WBC 14.6* 19.8*   HGB 16.2* 15.7   HCT 46.8 46.8    252     Recent Labs     10/24/21  1004 10/25/21  0650    138   K 4.5 4.5    98   CO2 26 25   BUN 28* 34*   CREATININE 1.0 0.9   CALCIUM 9.2 9.0     No results for input(s): AST, ALT, BILIDIR, BILITOT, ALKPHOS in the last 72 hours. No results for input(s): INR in the last 72 hours. No results for input(s): Jennie Mould in the last 72 hours.     Microbiology:    Blood culture #1:   Lab Results   Component Value Date    BC No growth-preliminary  10/24/2021       Blood culture #2:No results found for: BLOODCULT2    Organism:  Lab Results   Component Value Date    ORG Growth of Contaminants 10/21/2021       No results found for: LABGRAM    MRSA culture only:No results found for: [facility]    Urine culture: No results found for: LABURIN    Respiratory culture: No results found for: CULTRESP    Aerobic and Anaerobic :  No results found for: LABAERO  No results found for: LABANAE    Urinalysis:      Lab Results   Component Value Date    NITRU NEGATIVE 10/21/2021    WBCUA 25-50 10/21/2021    BACTERIA FEW 10/21/2021    RBCUA 0-2 10/21/2021    BLOODU MODERATE 10/21/2021    GLUCOSEU 250 10/21/2021       Radiology:  XR CHEST PORTABLE   Final Result   Interval worsening of patchy opacities at the lung bases. **This report has been created using voice recognition software. It may contain minor errors which are inherent in voice recognition technology. **      Final report electronically signed by Dr. Dulce Maria Cho MD on 10/24/2021 8:11 AM      CTA CHEST W WO CONTRAST   Final Result       1. No evidence of pulmonary embolus. 2. Diffuse patchy groundglass opacities more pronounced at the periphery and lung bases as evidence for atypical infectious/inflammatory process to include viral pneumonia. 3. Masslike opacity in the right upper lobe anteriorly with linear and patchy opacities extending to the pleura. This likely represents the same infectious/inflammatory process as the other groundglass opacities. However, underlying neoplasm can't be    excluded. Recommend short-term follow-up imaging to resolution. **This report has been created using voice recognition software. It may contain minor errors which are inherent in voice recognition technology. **      Final report electronically signed by Dr. Dulce Maria Cho MD on 10/21/2021 2:10 PM      CT ABDOMEN PELVIS W IV CONTRAST Additional Contrast? None   Final Result       1.  No evidence of acute intra-abdominal or intrapelvic abnormality. 2. Multiple renal cystic lesions, not fully characterized on this exam. A cystic lesion at the inferior pole of the left kidney adjacent fat stranding towards follow-up with multiphase CT or MRI to exclude an enhancing component. 3. Prominent colonic diverticulosis. 4. Hepatic steatosis. 5. Small to moderate hiatal hernia. **This report has been created using voice recognition software. It may contain minor errors which are inherent in voice recognition technology. **      Final report electronically signed by Dr. Aryan Chamberlain MD on 10/21/2021 2:16 PM      XR CHEST PORTABLE   Final Result      1. A 4.9 x 3.3 cm right upper lobe mass is seen along the right paratracheal region. A CT of the chest, abdomen, and pelvis with contrast is recommended for further evaluation. 2. Other nodular opacities are noted in both lungs that felt to be related to the underlying lung mass. 3. Mild increase in pulmonary interstitial markings. **This report has been created using voice recognition software. It may contain minor errors which are inherent in voice recognition technology. **      Final report electronically signed by Dr Romana Romney on 10/21/2021 11:58 AM      CTA CHEST W [address]    (Results Pending)     CTA CHEST W WO CONTRAST    Result Date: 10/21/2021  PROCEDURE: CTA CHEST W WO CONTRAST CLINICAL INFORMATION: right upper lobe mass, hypoxic. Shortness of breath. COMPARISON: Chest radiograph from the same date. TECHNIQUE: 3 mm axial images were obtained through the chest during fast bolus administration of 80 mL Isovue-370 injected in the right forearm. A non-contrast localizer was obtained.   3D reconstructions were performed on the scanner to include MIP coronal and sagittal images through the chest. All CT scans at this facility use dose modulation, iterative reconstruction, and/or weight-based dosing when appropriate to reduce radiation dose to as low as reasonably achievable. FINDINGS:  There is good contrast bolus within the pulmonary arteries, adequate for evaluation to the subsegmental level. There are no focal filling defects within the pulmonary arteries to suggest pulmonary embolus. There is mild mural calcification in the aortic  arch. No aneurysm or dissection is identified. No axillary, mediastinal or hilar lymphadenopathy is identified. There is an elongated irregular masslike density in the anterior aspect of the right upper lobe with adjacent linear and patchy opacities extending to the pleura. This appears to correspond to the masslike density on previous chest radiograph. There are prominent patchy groundglass opacities in both lungs at the periphery. There are more confluent groundglass opacities at the posterior lung bases, right greater than left. There is a small-to-moderate hiatal hernia. There are calcified granulomas in the spleen. There is a dextrocurvature of the thoracic spine. There are moderate degenerative changes of the spine. No suspicious osseous lesion is identified. 1. No evidence of pulmonary embolus. 2. Diffuse patchy groundglass opacities more pronounced at the periphery and lung bases as evidence for atypical infectious/inflammatory process to include viral pneumonia. 3. Masslike opacity in the right upper lobe anteriorly with linear and patchy opacities extending to the pleura. This likely represents the same infectious/inflammatory process as the other groundglass opacities. However, underlying neoplasm can't be excluded. Recommend short-term follow-up imaging to resolution. **This report has been created using voice recognition software. It may contain minor errors which are inherent in voice recognition technology. ** Final report electronically signed by Dr. Sheilah Pallas, MD on 10/21/2021 2:10 PM    CT ABDOMEN PELVIS W IV CONTRAST Additional Contrast? None    Result Date: 10/21/2021  PROCEDURE: CT ABDOMEN PELVIS W IV CONTRAST CLINICAL INFORMATION: Lung Mass . COMPARISON: None. TECHNIQUE: Axial 5 mm CT images were obtained through the abdomen and pelvis after the administration of 80 mL Isovue-370 injected in the right forearm with sagittal and coronal reconstructions. All CT scans at this facility use dose modulation, iterative reconstruction, and/or weight-based dosing when appropriate to reduce radiation dose to as low as reasonably achievable. FINDINGS:  There is a small to moderate-sized hiatal hernia. The liver is diffusely hypodense without focal lesion identified. The gallbladder is surgically absent. The extrahepatic bile duct is prominent throughout possibly on the basis of postsurgical dilatation. Adrenal glands are unremarkable. There are multiple hypodense cystic lesions of both kidneys which are not fully characterized on this exam. There is a irregular hypodense lesion at the inferior pole of left kidney with adjacent stranding in the fat. There are calcified granulomas in the spleen. The pancreas is partially fatty replaced. No retroperitoneal or mesenteric lymphadenopathy is identified. There are numerous diverticula within the large bowel without definite adjacent inflammation to suggest diverticulitis. Small bowel appears within normal limits. The unopacified bladder is unremarkable. The uterus appears to be surgically absent. No free  fluid is identified. There are mild-to-moderate degenerative changes of the lumbar spine. No suspicious osseous lesion is identified. 1. No evidence of acute intra-abdominal or intrapelvic abnormality. 2. Multiple renal cystic lesions, not fully characterized on this exam. A cystic lesion at the inferior pole of the left kidney adjacent fat stranding towards follow-up with multiphase CT or MRI to exclude an enhancing component. 3. Prominent colonic diverticulosis. 4. Hepatic steatosis. 5. Small to moderate hiatal hernia.  **This report has been created using voice recognition software. It may contain minor errors which are inherent in voice recognition technology. ** Final report electronically signed by Dr. Dulce Maria Cho MD on 10/21/2021 2:16 PM    XR CHEST PORTABLE    Result Date: 10/21/2021  PROCEDURE: XR CHEST PORTABLE CLINICAL INFORMATION: Shortness of breath COMPARISON: None TECHNIQUE: AP portable chest radiograph performed. FINDINGS: There is a 4.9 x 3.3 cm masslike abnormality in the right paratracheal region. Increase in pulmonary markings are seen. There is vague nodular opacities in both lungs more on the right. Cardiac silhouette is not enlarged. No pleural effusion. No pneumothorax. No acute bony abnormality. Scoliosis. Degenerative changes of the thoracic spine. The bones appear demineralized. 1. A 4.9 x 3.3 cm right upper lobe mass is seen along the right paratracheal region. A CT of the chest, abdomen, and pelvis with contrast is recommended for further evaluation. 2. Other nodular opacities are noted in both lungs that felt to be related to the underlying lung mass. 3. Mild increase in pulmonary interstitial markings. **This report has been created using voice recognition software. It may contain minor errors which are inherent in voice recognition technology. ** Final report electronically signed by Dr Geremias Gusman on 10/21/2021 11:58 AM      Electronically signed by Jt Robles DO on 10/25/2021 at 12:56 PM

## 2021-10-26 NOTE — PROGRESS NOTES
Maryjane Ryan 59  PHYSICAL THERAPY MISSED TREATMENT NOTE  [facility]    Date: 10/26/2021  Patient Name: Rachel Avery        MRN: [id number]   : 1940  (79 y.o.)  Gender: female                REASON FOR MISSED TREATMENT:  Missed Treat.         Pt on max bipap settings, plan hold this date and check back when more medically appropriate

## 2021-10-26 NOTE — ACP (ADVANCE CARE PLANNING)
Advance Care Planning     Advance Care Planning Inpatient Note  [facility] Department    Today's Date: 10/26/2021  Unit: [facility]    Received request from IDT Member and rounding. Upon review of chart and communication with care team, patient's decision making abilities are not in question. . Patient was/were present in the room during visit. Goals of ACP Conversation:  Discuss advance care planning documents    Health Care Decision Makers:       Primary Decision Maker: Latanya Ryan - [phone]    Summary:  Completed [address]    Advance Care Planning Documents (Patient Wishes):  Healthcare Power of /Advance Directive Appointment of [address] was signed by patient, and witnessed, as well as notarized by     Assessment:  Patient was lying in her bed receiving oxygen to assist with breathing upon entry. She was welcoming and approachable for the encounter. * She was alert and oriented with decision-making capacity to express her wishes at this time. Interventions:  Assisted in the completion of documents according to patient's wishes at this time   was rounding in the unit to engage patient in [address] conversations, and to confirm care preferences moving forward. Care Preferences Communicated:   No    Outcomes/Plan:  ACP Discussion: Completed  New advance directive completed. Copy of advance directive given to staff to scan into medical record.     Electronically signed by Gisel Wyatt on 10/26/2021 at 1:38 PM

## 2021-10-26 NOTE — PROGRESS NOTES
Palliative care eval received to discuss code status. Pt admitted for resp failure due to COVID-19. Per chart review, pt has been very indecisive about if she would want intubation and MIV if needed. Pt has had escalation in o2 needs, she is currently on BiPAP at 100% O2. Writer in to see pt. Pt is awake and alert, oriented to all spheres, endorses that she feels \"scared\". Writer fully explained to pt that she is \"maxed\" out on BiPAP and if she requires higher O2 support, intubation and MIV is next step. Writer explained that pt would be sedated if placed on vent,so she isn't aware of what is going on. Writer explained that there's also high risk of dying on the vent. Writer explained that it is also possible that pt will \"get through\" this with BiPAP support but it could take days for her to be able to be weaned down from it. We discussed that either scenario may require lengthy time in hospital for recovery. Discussed that there is not \"right or wrong\" answer for care decisions, that pt needs to understand the full impact of each choice so she is able to make an informed choice. Pt states that she doesn't want to die and is willing to \"try the \"life support\" machine if needed, she states that she will hope that she is a \"miracle\" case. We discussed that pt wants her grandson,Jerardo to make her decisions if she is unable. Writer also explained resuscitation for cardiac arrest, pt immediately states, \" I want them to try to bring me back\". Respect for pt's wish expressed. Pt states no further questions or needs. Emotional support and encouragement given. Pt's nurse,ADDISON Betancourt updated. Secure text sent to Dr Jackelyn Anaya to update him. Call placed to pt's grandson,Jerardo to update on above discussion. Writer also explained to [place] that, depending on pt's response to BiPAP and length of time on it, pt could require alternate nutrition means such as NG tube feeds.   Also explained signs and symptoms that would indicate pt is 'failing' BiPAP and needing to be intubated. Writer did not discuss these issues with pt so as not to cause further anxiety for pt. Questions answered and Ted Price states understanding and appreciation for the information. Ted Price requests that he be updated, in between usual update times, if plan is to place NG tube. Emotional support and encouragement given. Palliative care will continue to follow and assist with advance care planning as appropriate Floor to call PRN for urgent needs.

## 2021-10-26 NOTE — PROGRESS NOTES
Maryjane Ryan 59  OCCUPATIONAL THERAPY MISSED TREATMENT NOTE  [facility]  8B-22/022-A      Date: 10/26/2021  Patient Name: Cata Kwong        CSN: [id number]   : 1940  (79 y.o.)  Gender: female   Referring Practitioner: Dr. René Hilton MD  Diagnosis: Hyponatremia         REASON FOR MISSED TREATMENT: Pt on max bipap settings, plan hold this date and check back when more medically appropriate.

## 2021-10-26 NOTE — PROGRESS NOTES
Hospitalist Progress Note      Patient:  Argelia Whitman    Unit/Bed:8B-22/022-A  YOB: 1940  MRN: [id number]   Acct: [de-identified]   PCP: CESAR Steinberg CNP  Date of Admission: 10/21/2021    Assessment/Plan:    1. Acute hypoxic respiratory failure  2. COVID-19 pneumonia:  a. Covid directed therapy:  i. Decadron 10 mg on 10/21.  6 mg on 10/22-current  ii. Baricitinib 10/21-current  b. Incentive spirometer and Acapella valve as able  c. CT angiogram of the chest repeated on 10/25. Shows worsening of the patient's groundglass opacities. No evidence of PE.  d. Patient has had discussions with myself, nursing staff, palliative care regarding her CODE STATUS. She is definitively said that she would be okay with intubation. She remains full code at this time. Continue with BiPAP and wean if tolerated.  e. We will start maintenance IV fluids with D5W/half-normal saline as patient has been unable to come off the BiPAP over the previous 48 hours. 3. Concern for superimposed bacterial pneumonia  a. Continue with empiric azithromycin and Merrem given severe allergic reaction to cephalosporins. 4. History of HFpEF, not in acute exacerbation:   a. Continue home Norvasc, aspirin, Lipitor, metoprolol  5. Hyperlipidemia:   a. Continue Lipitor  6. Hypertension:   a. Continue home medications  7. Diabetes mellitus type 2:  a. Blood glucose better controlled. Does have some mild hyperglycemia secondary to glucocorticoid use.  b. Continue basal bolus insulin. c. Continue SSI and Accu-Cheks. 8. Hypothyroidism:   a. Continue home Synthroid  9. Suspicious pulmonary mass  a. Patient with possible pulmonary mass in the right upper lobe of her lung. b. Apparently has been there before and is relatively stable. Will need outpatient follow-up pending resolution of acute illness. Disposition: Unknown at this time. Currently BiPAP dependent.      Chief Complaint: Shortness of breath    Hospital Course:    10/21: Patient presents to the hospital with complaints of shortness of breath. Found to be COVID-19 positive with hypoxia. Started on empiric antibiotics with Merrem and azithromycin based on severe allergy history. Also started on IV Decadron and baricitinib. Patient requiring nasal cannula. 10/24: Patient had significant worsening of her oxygenation earlier this morning. Quickly escalated from 5 L nasal cannula to a maximum high flow nasal cannula. Patient required BiPAP therapy later in the day. Essentially BiPAP dependent overnight. Subjective (past 24 hours):   Patient states she feels like she is breathing relatively okay. Remains BiPAP dependent at this time. Denies any chest pain or palpitations. Past medical history, family history, social history and allergies reviewed again and is unchanged since admission. ROS (12 point review of systems completed. Pertinent positives noted.  Otherwise ROS is negative)     Medications:  Reviewed    Infusion Medications    dextrose 5 % and 0.45 % NaCl 100 mL/hr at 10/26/21 1037    dextrose       Scheduled Medications    dexamethasone  20 mg IntraVENous Daily    Followed by   Su Argueta ON 10/29/2021] dexamethasone  10 mg IntraVENous Daily    insulin glargine  10 Units SubCUTAneous Nightly    insulin lispro  0-12 Units SubCUTAneous TID WC    insulin lispro  0-6 Units SubCUTAneous Nightly    azithromycin  500 mg IntraVENous Q24H    meropenem  1,000 mg IntraVENous Q8H    amLODIPine  5 mg Oral Daily    aspirin  81 mg Oral Daily    levothyroxine  75 mcg Oral Daily    metoprolol succinate  50 mg Oral Daily    atorvastatin  10 mg Oral Daily    sodium chloride flush  5-40 mL IntraVENous 2 times per day    enoxaparin  30 mg SubCUTAneous Q12H    baricitinib  2 mg Oral Daily     PRN Meds: albuterol sulfate HFA, glucose, dextrose, glucagon (rDNA), dextrose, ondansetron **OR** ondansetron, polyethylene glycol, acetaminophen **OR** acetaminophen      Intake/Output Summary (Last 24 hours) at 10/26/2021 1356  Last data filed at 10/26/2021 0405  Gross per 24 hour   Intake 660 ml   Output 860 ml   Net -200 ml       Diet:  ADULT DIET; Regular; 3 carb choices (45 gm/meal); Low Sodium (2 gm)    Exam:  /62   Pulse 67   Temp 98 °F (36.7 °C) (Axillary)   Resp 25   Ht 5' 2\" (1.575 m)   Wt 190 lb (86.2 kg)   SpO2 90%   BMI 34.75 kg/m²   General appearance: No apparent distress, appears stated age and cooperative. HEENT: Pupils equal, round, and reactive to light. Conjunctivae/corneas clear. Neck: Supple, with full range of motion. No jugular venous distention. Trachea midline. Respiratory: Tachypnea noted with respiratory rate between 22-25. Diminished bibasilar breath sounds. Mild bibasilar rhonchi. Cardiovascular: Regular rate and rhythm with normal S1/S2 without murmurs, rubs or gallops. Abdomen: Soft, non-tender, non-distended with normal bowel sounds. Musculoskeletal: passive and active ROM x 4 extremities. Skin: Skin color, texture, turgor normal.  No rashes or lesions. Neurologic:  Neurovascularly intact without any focal sensory/motor deficits. Cranial nerves: II-XII intact, grossly non-focal.  Psychiatric: Alert and oriented, thought content appropriate, normal insight  Capillary Refill: Brisk,< 3 seconds   Peripheral Pulses: +2 palpable, equal bilaterally     Labs:   Recent Labs     10/24/21  1004 10/25/21  0650 10/26/21  0559   WBC 14.6* 19.8* 22.5*   HGB 16.2* 15.7 15.3   HCT 46.8 46.8 45.8    252 254     Recent Labs     10/24/21  1004 10/25/21  0650 10/26/21  0559    138 133*   K 4.5 4.5 4.7    98 99   CO2 26 25 22*   BUN 28* 34* 38*   CREATININE 1.0 0.9 0.8   CALCIUM 9.2 9.0 9.1     No results for input(s): AST, ALT, BILIDIR, BILITOT, ALKPHOS in the last 72 hours. No results for input(s): INR in the last 72 hours.   No results for input(s): Ginny Height in the last 72 hours. Microbiology:    Blood culture #1:   Lab Results   Component Value Date    BC No growth-preliminary  10/24/2021       Blood culture #2:No results found for: Kaylynn Frederick    Organism:  Lab Results   Component Value Date    ORG Growth of Contaminants 10/21/2021       No results found for: LABGRAM    MRSA culture only:No results found for: [facility]    Urine culture: No results found for: LABURIN    Respiratory culture: No results found for: CULTRESP    Aerobic and Anaerobic :  No results found for: LABAERO  No results found for: LABANAE    Urinalysis:      Lab Results   Component Value Date    NITRU NEGATIVE 10/21/2021    WBCUA 25-50 10/21/2021    BACTERIA FEW 10/21/2021    RBCUA 0-2 10/21/2021    BLOODU MODERATE 10/21/2021    GLUCOSEU 250 10/21/2021       Radiology:  CTA CHEST W WO CONTRAST   Final Result   1. No pulmonary emboli are seen. Findings of pulmonary chill hypertension. 2. 2 cm nodule/mass lesion right upper lobe anteriorly, possibly an inflammatory nodule. Repeat CT thorax in 3-6 months would be helpful for reevaluation of this nodule. 3. Relatively severe groundglass infiltrates throughout both lungs, consistent with Covid pneumonia/pulmonary edema. Overall appearance has significantly worsened since prior study. **This report has been created using voice recognition software. It may contain minor errors which are inherent in voice recognition technology. **          Final report electronically signed by Dr. Tom Chisholm on 10/25/2021 3:18 PM      XR CHEST PORTABLE   Final Result   Interval worsening of patchy opacities at the lung bases. **This report has been created using voice recognition software. It may contain minor errors which are inherent in voice recognition technology. **      Final report electronically signed by Dr. Luna Lyman MD on 10/24/2021 8:11 AM      CTA CHEST W WO CONTRAST   Final Result       1.  No evidence of pulmonary embolus. 2. Diffuse patchy groundglass opacities more pronounced at the periphery and lung bases as evidence for atypical infectious/inflammatory process to include viral pneumonia. 3. Masslike opacity in the right upper lobe anteriorly with linear and patchy opacities extending to the pleura. This likely represents the same infectious/inflammatory process as the other groundglass opacities. However, underlying neoplasm can't be    excluded. Recommend short-term follow-up imaging to resolution. **This report has been created using voice recognition software. It may contain minor errors which are inherent in voice recognition technology. **      Final report electronically signed by Dr. Luna Lyman MD on 10/21/2021 2:10 PM      CT ABDOMEN PELVIS W IV CONTRAST Additional Contrast? None   Final Result       1. No evidence of acute intra-abdominal or intrapelvic abnormality. 2. Multiple renal cystic lesions, not fully characterized on this exam. A cystic lesion at the inferior pole of the left kidney adjacent fat stranding towards follow-up with multiphase CT or MRI to exclude an enhancing component. 3. Prominent colonic diverticulosis. 4. Hepatic steatosis. 5. Small to moderate hiatal hernia. **This report has been created using voice recognition software. It may contain minor errors which are inherent in voice recognition technology. **      Final report electronically signed by Dr. Luna Lyman MD on 10/21/2021 2:16 PM      XR CHEST PORTABLE   Final Result      1. A 4.9 x 3.3 cm right upper lobe mass is seen along the right paratracheal region. A CT of the chest, abdomen, and pelvis with contrast is recommended for further evaluation. 2. Other nodular opacities are noted in both lungs that felt to be related to the underlying lung mass. 3. Mild increase in pulmonary interstitial markings.             **This report has been created using voice recognition software. It may contain minor errors which are inherent in voice recognition technology. **      Final report electronically signed by Dr Jordi Nolen on 10/21/2021 11:58 AM        CTA CHEST W WO CONTRAST    Result Date: 10/21/2021  PROCEDURE: CTA CHEST W WO CONTRAST CLINICAL INFORMATION: right upper lobe mass, hypoxic. Shortness of breath. COMPARISON: Chest radiograph from the same date. TECHNIQUE: 3 mm axial images were obtained through the chest during fast bolus administration of 80 mL Isovue-370 injected in the right forearm. A non-contrast localizer was obtained. 3D reconstructions were performed on the scanner to include MIP coronal and sagittal images through the chest. All CT scans at this facility use dose modulation, iterative reconstruction, and/or weight-based dosing when appropriate to reduce radiation dose to as low as reasonably achievable. FINDINGS:  There is good contrast bolus within the pulmonary arteries, adequate for evaluation to the subsegmental level. There are no focal filling defects within the pulmonary arteries to suggest pulmonary embolus. There is mild mural calcification in the aortic  arch. No aneurysm or dissection is identified. No axillary, mediastinal or hilar lymphadenopathy is identified. There is an elongated irregular masslike density in the anterior aspect of the right upper lobe with adjacent linear and patchy opacities extending to the pleura. This appears to correspond to the masslike density on previous chest radiograph. There are prominent patchy groundglass opacities in both lungs at the periphery. There are more confluent groundglass opacities at the posterior lung bases, right greater than left. There is a small-to-moderate hiatal hernia. There are calcified granulomas in the spleen. There is a dextrocurvature of the thoracic spine. There are moderate degenerative changes of the spine. No suspicious osseous lesion is identified.       1. No evidence of pulmonary embolus. 2. Diffuse patchy groundglass opacities more pronounced at the periphery and lung bases as evidence for atypical infectious/inflammatory process to include viral pneumonia. 3. Masslike opacity in the right upper lobe anteriorly with linear and patchy opacities extending to the pleura. This likely represents the same infectious/inflammatory process as the other groundglass opacities. However, underlying neoplasm can't be excluded. Recommend short-term follow-up imaging to resolution. **This report has been created using voice recognition software. It may contain minor errors which are inherent in voice recognition technology. ** Final report electronically signed by Dr. Kendall Perry MD on 10/21/2021 2:10 PM    CT ABDOMEN PELVIS W IV CONTRAST Additional Contrast? None    Result Date: 10/21/2021  PROCEDURE: CT ABDOMEN PELVIS W IV CONTRAST CLINICAL INFORMATION: Lung Mass . COMPARISON: None. TECHNIQUE: Axial 5 mm CT images were obtained through the abdomen and pelvis after the administration of 80 mL Isovue-370 injected in the right forearm with sagittal and coronal reconstructions. All CT scans at this facility use dose modulation, iterative reconstruction, and/or weight-based dosing when appropriate to reduce radiation dose to as low as reasonably achievable. FINDINGS:  There is a small to moderate-sized hiatal hernia. The liver is diffusely hypodense without focal lesion identified. The gallbladder is surgically absent. The extrahepatic bile duct is prominent throughout possibly on the basis of postsurgical dilatation. Adrenal glands are unremarkable. There are multiple hypodense cystic lesions of both kidneys which are not fully characterized on this exam. There is a irregular hypodense lesion at the inferior pole of left kidney with adjacent stranding in the fat. There are calcified granulomas in the spleen. The pancreas is partially fatty replaced.  No retroperitoneal or mesenteric lymphadenopathy is **This report has been created using voice recognition software. It may contain minor errors which are inherent in voice recognition technology. ** Final report electronically signed by Dr Kira Blake on 10/21/2021 11:58 AM      Electronically signed by Etienne Thompson DO on 10/26/2021 at 1:56 PM

## 2021-10-27 NOTE — PLAN OF CARE
Maryjane Ryan 59  OCCUPATIONAL THERAPY MISSED TREATMENT NOTE  [facility]  8B-22/022-A      Date: 10/27/2021  Patient Name: Markell Abbasi        CSN: [id number]   : 1940  (79 y.o.)  Gender: female   Referring Practitioner: Dr. Rhys Al MD  Diagnosis: Hyponatremia         REASON FOR MISSED TREATMENT: Hold Treatment per Nursing  Pt currently on BiPAP with high settings with increased respiratory rate. Will recheck tomorrow.

## 2021-10-27 NOTE — PROGRESS NOTES
Maryjane Ryan 59  PHYSICAL THERAPY MISSED TREATMENT NOTE  [facility]    Date: 10/27/2021  Patient Name: Isabella Bates        MRN: [id number]   : 1940  (79 y.o.)  Gender: female                REASON FOR MISSED TREATMENT:  Hold treatment per nursing request.  Pt currently on BiPAP with high settings with increased respiratory rate. PT to continue to follow and evaluate as able and pt medically appropriate.      Akiko Conroy PT, DPT

## 2021-10-27 NOTE — PROGRESS NOTES
Hospitalist Progress Note      Patient:  Isabella Bates    Unit/Bed:8B-22/022-A  YOB: 1940  MRN: [id number]   Acct: [de-identified]   PCP: CESAR Cueva CNP  Date of Admission: 10/21/2021    Assessment/Plan:    1. Acute hypoxic respiratory failure, worsening: secondary to #2. Continue to wean O2 as tolerated. High likelihood of intubation given increased tachypnea. Monitor. 2. COVID-19 pneumonia:  a. Covid directed therapy:  i. Decadron 10 mg on 10/21. High dose with 20 mg on 10/24-current  ii. Baricitinib 10/21-current  b. Incentive spirometer and Acapella valve as able  c. CT angiogram of the chest repeated on 10/25. Shows worsening of the patient's groundglass opacities. No evidence of PE.  d. Patient has had discussions with myself, nursing staff, palliative care regarding her CODE STATUS. She is definitively said that she would be okay with intubation. She remains full code at this time. Continue with BiPAP and wean if tolerated.  e. We will start maintenance IV fluids with D5W/half-normal saline as patient has been unable to come off the BiPAP over the previous 48 hours. 3. Concern for superimposed bacterial pneumonia  a. Continue with empiric azithromycin and Merrem given severe allergic reaction to cephalosporins. Will treat for 7 days. 4. History of HFpEF, not in acute exacerbation:   a. Continue home Norvasc, aspirin, Lipitor, metoprolol  5. Hyperlipidemia:   a. Continue Lipitor  6. Hypertension:   a. Continue home medications  7. Diabetes mellitus type 2:  a. Blood glucose better controlled. Does have some mild hyperglycemia secondary to glucocorticoid use.  b. Continue basal bolus insulin. c. Continue SSI and Accu-Cheks. 8. Hypothyroidism:   a. Continue home Synthroid  9. Suspicious pulmonary mass  a. Patient with possible pulmonary mass in the right upper lobe of her lung.   b. Apparently has been there before and is relatively stable. Will need outpatient follow-up pending resolution of acute illness. Disposition: Unknown at this time. Currently BiPAP dependent. Chief Complaint: Shortness of breath    Hospital Course:    10/21: Patient presents to the hospital with complaints of shortness of breath. Found to be COVID-19 positive with hypoxia. Started on empiric antibiotics with Merrem and azithromycin based on severe allergy history. Also started on IV Decadron and baricitinib. Patient requiring nasal cannula. 10/24: Patient had significant worsening of her oxygenation earlier this morning. Quickly escalated from 5 L nasal cannula to a maximum high flow nasal cannula. Patient required BiPAP therapy later in the day. Essentially BiPAP dependent overnight. 10/25: Patient states she feels like she is breathing relatively okay. Remains BiPAP dependent at this time. Denies any chest pain or palpitations. Subjective (past 24 hours):   10/27: I assumed care of this patient today. She is non-toxic in appearance, however she still remains tachypneic maxed out on BiPAP. We discussed the possible need for intubation if she continues this and pt is agreeable. Denies CP. Does not feel SOB and denies a cough. No fever/chills. Past medical history, family history, social history and allergies reviewed again and is unchanged since admission. ROS (12 point review of systems completed. Pertinent positives noted.  Otherwise ROS is negative)     Medications:  Reviewed    Infusion Medications    dextrose 5 % and 0.45 % NaCl Stopped (10/27/21 0215)    dextrose       Scheduled Medications    LORazepam  0.5 mg IntraVENous Nightly    insulin glargine  18 Units SubCUTAneous Nightly    dexamethasone  20 mg IntraVENous Daily    Followed by   Natalie Contreras ON 10/29/2021] dexamethasone  10 mg IntraVENous Daily    insulin lispro  0-12 Units SubCUTAneous TID     insulin lispro  0-6 Units SubCUTAneous Nightly    azithromycin  500 mg IntraVENous Q24H    meropenem  1,000 mg IntraVENous Q8H    amLODIPine  5 mg Oral Daily    aspirin  81 mg Oral Daily    levothyroxine  75 mcg Oral Daily    metoprolol succinate  50 mg Oral Daily    atorvastatin  10 mg Oral Daily    sodium chloride flush  5-40 mL IntraVENous 2 times per day    enoxaparin  30 mg SubCUTAneous Q12H    baricitinib  2 mg Oral Daily     PRN Meds: albuterol sulfate HFA, glucose, dextrose, glucagon (rDNA), dextrose, ondansetron **OR** ondansetron, polyethylene glycol, acetaminophen **OR** acetaminophen      Intake/Output Summary (Last 24 hours) at 10/27/2021 0546  Last data filed at 10/27/2021 0331  Gross per 24 hour   Intake 3457.7 ml   Output 550 ml   Net 2907.7 ml       Diet:  ADULT DIET; Regular; 3 carb choices (45 gm/meal); Low Sodium (2 gm)    Exam:  BP (!) 159/74   Pulse 76   Temp 98.9 °F (37.2 °C) (Oral)   Resp (!) 32   Ht 5' 2\" (1.575 m)   Wt 170 lb 9.6 oz (77.4 kg)   SpO2 92%   BMI 31.20 kg/m²   General appearance: Acutely ill-appearing, elderly, no apparent distress, appears stated age and cooperative. HEENT: Pupils equal, round, and reactive to light. Conjunctivae/corneas clear. Neck: Supple, with full range of motion. No jugular venous distention. Trachea midline. Respiratory: Tachypnea noted with respiratory rate between 22-25. Diminished bibasilar breath sounds. Mild bibasilar rhonchi. Cardiovascular: Regular rate and rhythm with normal S1/S2 without murmurs, rubs or gallops. Abdomen: Soft, non-tender, non-distended with normal bowel sounds. Musculoskeletal: passive and active ROM x 4 extremities. Skin: Skin color, texture, turgor normal.  No rashes or lesions. Neurologic:  Neurovascularly intact without any focal sensory/motor deficits.  Cranial nerves: II-XII intact, grossly non-focal.  Psychiatric: Alert and oriented x3, thought content appropriate, normal insight  Capillary Refill: Brisk,< 3 seconds   Peripheral Pulses: +2 palpable, equal bilaterally     Labs:   Recent Labs     10/25/21  0650 10/26/21  0559 10/27/21  0551   WBC 19.8* 22.5* 29.3*   HGB 15.7 15.3 15.9   HCT 46.8 45.8 47.0    254 283     Recent Labs     10/25/21  0650 10/26/21  0559 10/27/21  0551    133* 133*   K 4.5 4.7 4.4   CL 98 99 97*   CO2 25 22* 23   BUN 34* 38* 32*   CREATININE 0.9 0.8 0.8   CALCIUM 9.0 9.1 8.7     No results for input(s): AST, ALT, BILIDIR, BILITOT, ALKPHOS in the last 72 hours. No results for input(s): INR in the last 72 hours. No results for input(s): Wayna Khat in the last 72 hours. Microbiology:    Blood culture #1:   Lab Results   Component Value Date    BC No growth-preliminary  10/24/2021       Blood culture #2:No results found for: Robert Goss    Organism:  Lab Results   Component Value Date    ORG Growth of Contaminants 10/21/2021       No results found for: LABGRAM    MRSA culture only:No results found for: [facility]    Urine culture: No results found for: LABURIN    Respiratory culture: No results found for: CULTRESP    Aerobic and Anaerobic :  No results found for: LABAERO  No results found for: LABANAE    Urinalysis:      Lab Results   Component Value Date    NITRU NEGATIVE 10/21/2021    WBCUA 25-50 10/21/2021    BACTERIA FEW 10/21/2021    RBCUA 0-2 10/21/2021    BLOODU MODERATE 10/21/2021    GLUCOSEU 250 10/21/2021       Radiology:  CTA CHEST W WO CONTRAST   Final Result   1. No pulmonary emboli are seen. Findings of pulmonary chill hypertension. 2. 2 cm nodule/mass lesion right upper lobe anteriorly, possibly an inflammatory nodule. Repeat CT thorax in 3-6 months would be helpful for reevaluation of this nodule. 3. Relatively severe groundglass infiltrates throughout both lungs, consistent with Covid pneumonia/pulmonary edema. Overall appearance has significantly worsened since prior study. **This report has been created using voice recognition software.   It may contain minor errors which are inherent in voice recognition technology. **          Final report electronically signed by Dr. Yumiko Smith on 10/25/2021 3:18 PM      XR CHEST PORTABLE   Final Result   Interval worsening of patchy opacities at the lung bases. **This report has been created using voice recognition software. It may contain minor errors which are inherent in voice recognition technology. **      Final report electronically signed by Dr. Rachel Hodge MD on 10/24/2021 8:11 AM      CTA CHEST W WO CONTRAST   Final Result       1. No evidence of pulmonary embolus. 2. Diffuse patchy groundglass opacities more pronounced at the periphery and lung bases as evidence for atypical infectious/inflammatory process to include viral pneumonia. 3. Masslike opacity in the right upper lobe anteriorly with linear and patchy opacities extending to the pleura. This likely represents the same infectious/inflammatory process as the other groundglass opacities. However, underlying neoplasm can't be    excluded. Recommend short-term follow-up imaging to resolution. **This report has been created using voice recognition software. It may contain minor errors which are inherent in voice recognition technology. **      Final report electronically signed by Dr. Rachel Hodge MD on 10/21/2021 2:10 PM      CT ABDOMEN PELVIS W IV CONTRAST Additional Contrast? None   Final Result       1. No evidence of acute intra-abdominal or intrapelvic abnormality. 2. Multiple renal cystic lesions, not fully characterized on this exam. A cystic lesion at the inferior pole of the left kidney adjacent fat stranding towards follow-up with multiphase CT or MRI to exclude an enhancing component. 3. Prominent colonic diverticulosis. 4. Hepatic steatosis. 5. Small to moderate hiatal hernia. **This report has been created using voice recognition software.  It may contain minor errors which are inherent in voice patchy opacities extending to the pleura. This appears to correspond to the masslike density on previous chest radiograph. There are prominent patchy groundglass opacities in both lungs at the periphery. There are more confluent groundglass opacities at the posterior lung bases, right greater than left. There is a small-to-moderate hiatal hernia. There are calcified granulomas in the spleen. There is a dextrocurvature of the thoracic spine. There are moderate degenerative changes of the spine. No suspicious osseous lesion is identified. 1. No evidence of pulmonary embolus. 2. Diffuse patchy groundglass opacities more pronounced at the periphery and lung bases as evidence for atypical infectious/inflammatory process to include viral pneumonia. 3. Masslike opacity in the right upper lobe anteriorly with linear and patchy opacities extending to the pleura. This likely represents the same infectious/inflammatory process as the other groundglass opacities. However, underlying neoplasm can't be excluded. Recommend short-term follow-up imaging to resolution. **This report has been created using voice recognition software. It may contain minor errors which are inherent in voice recognition technology. ** Final report electronically signed by Dr. Acosta Blood MD on 10/21/2021 2:10 PM    CT ABDOMEN PELVIS W IV CONTRAST Additional Contrast? None    Result Date: 10/21/2021  PROCEDURE: CT ABDOMEN PELVIS W IV CONTRAST CLINICAL INFORMATION: Lung Mass . COMPARISON: None. TECHNIQUE: Axial 5 mm CT images were obtained through the abdomen and pelvis after the administration of 80 mL [id number]-[id number] injected in the right forearm with sagittal and coronal reconstructions. All CT scans at this facility use dose modulation, iterative reconstruction, and/or weight-based dosing when appropriate to reduce radiation dose to as low as reasonably achievable. FINDINGS:  There is a small to moderate-sized hiatal hernia.  The liver is diffusely hypodense without focal lesion identified. The gallbladder is surgically absent. The extrahepatic bile duct is prominent throughout possibly on the basis of postsurgical dilatation. Adrenal glands are unremarkable. There are multiple hypodense cystic lesions of both kidneys which are not fully characterized on this exam. There is a irregular hypodense lesion at the inferior pole of left kidney with adjacent stranding in the fat. There are calcified granulomas in the spleen. The pancreas is partially fatty replaced. No retroperitoneal or mesenteric lymphadenopathy is identified. There are numerous diverticula within the large bowel without definite adjacent inflammation to suggest diverticulitis. Small bowel appears within normal limits. The unopacified bladder is unremarkable. The uterus appears to be surgically absent. No free  fluid is identified. There are mild-to-moderate degenerative changes of the lumbar spine. No suspicious osseous lesion is identified. 1. No evidence of acute intra-abdominal or intrapelvic abnormality. 2. Multiple renal cystic lesions, not fully characterized on this exam. A cystic lesion at the inferior pole of the left kidney adjacent fat stranding towards follow-up with multiphase CT or MRI to exclude an enhancing component. 3. Prominent colonic diverticulosis. 4. Hepatic steatosis. 5. Small to moderate hiatal hernia. **This report has been created using voice recognition software. It may contain minor errors which are inherent in voice recognition technology. ** Final report electronically signed by Dr. Nino Voss MD on 10/21/2021 2:16 PM    XR CHEST PORTABLE    Result Date: 10/21/2021  PROCEDURE: XR CHEST PORTABLE CLINICAL INFORMATION: Shortness of breath COMPARISON: None TECHNIQUE: AP portable chest radiograph performed. FINDINGS: There is a 4.9 x 3.3 cm masslike abnormality in the right paratracheal region. Increase in pulmonary markings are seen.  There is vague nodular opacities in both lungs more on the right. Cardiac silhouette is not enlarged. No pleural effusion. No pneumothorax. No acute bony abnormality. Scoliosis. Degenerative changes of the thoracic spine. The bones appear demineralized. 1. A 4.9 x 3.3 cm right upper lobe mass is seen along the right paratracheal region. A CT of the chest, abdomen, and pelvis with contrast is recommended for further evaluation. 2. Other nodular opacities are noted in both lungs that felt to be related to the underlying lung mass. 3. Mild increase in pulmonary interstitial markings. **This report has been created using voice recognition software. It may contain minor errors which are inherent in voice recognition technology. ** Final report electronically signed by Dr Shine Brewer on 10/21/2021 11:58 AM      Electronically signed by Hernando Dahl PA-C on 10/27/2021 at 9:22 AM

## 2021-10-28 NOTE — PROGRESS NOTES
Maryjane Ryan 59  PHYSICAL THERAPY MISSED TREATMENT NOTE  [facility]    Date: 10/28/2021  Patient Name: Meng Epstein        MRN: [id number]   : 1940  (79 y.o.)  Gender: female                REASON FOR MISSED TREATMENT:  Hold treatment per nursing request.  Patient continues to be max-ed out on bi-pap, and is restless not keeping bi-pap in place. Patient has been a hold for > three days for PT orders to eval and treat. PT to sign off; please reorder when patient stable. Kojo Milton.  Elieser Ramirez, Sunil [place] [address]

## 2021-10-28 NOTE — PROGRESS NOTES
This Rn called to update Aziza lino. All questions answered.      Electronically signed by Rich Marie RN on 10/28/2021 at 6:15 AM

## 2021-10-28 NOTE — PROGRESS NOTES
Hospitalist Progress Note      Patient:  Chuy Quiroz    Unit/Bed:8B-22/022-A  YOB: 1940  MRN: [id number]   Acct: [de-identified]   PCP: CESAR Morgan CNP  Date of Admission: 10/21/2021    Assessment/Plan:    1. Acute hypoxic respiratory failure, worsening: secondary to #2. Continue to wean O2 as tolerated. High likelihood of intubation given increased tachypnea. Monitor. 2. COVID-19 pneumonia:  a. Covid directed therapy:  i. Decadron 10 mg on 10/21. High dose with 20 mg on 10/24-current  ii. Baricitinib 10/21-current  b. Incentive spirometer and Acapella valve as able  c. CT angiogram of the chest repeated on 10/25. Shows worsening of the patient's groundglass opacities. No evidence of PE.  d. Patient has had discussions with myself, nursing staff, palliative care regarding her CODE STATUS. She is definitively said that she would be okay with intubation. She remains full code at this time. Continue with BiPAP and wean if tolerated.  e. We will start maintenance IV fluids with D5W/half-normal saline as patient has been unable to come off the BiPAP over the previous 72 hours. Dietitian consult for nutrition concerns. 3. Concern for superimposed bacterial pneumonia  a. Continue with empiric azithromycin and Merrem given severe allergic reaction to cephalosporins. Will treat for 7 days. 4. History of HFpEF, not in acute exacerbation:   a. Continue home Norvasc, aspirin, Lipitor, metoprolol  5. Hyperlipidemia:   a. Continue Lipitor  6. Hypertension:   a. Continue home medications  7. Diabetes mellitus type 2:  a. Blood glucose better controlled. Does have some mild hyperglycemia secondary to glucocorticoid use.  b. Continue basal bolus insulin. c. Continue SSI and Accu-Cheks. 8. Hypothyroidism:   a. Continue home Synthroid  9. Suspicious pulmonary mass  a.  Patient with possible pulmonary mass in the right upper lobe of her lung.  b. Apparently has been there before and is relatively stable. Will need outpatient follow-up pending resolution of acute illness. Disposition: Unknown at this time. Currently BiPAP dependent. Chief Complaint: Shortness of breath    Hospital Course:    10/21: Patient presents to the hospital with complaints of shortness of breath. Found to be COVID-19 positive with hypoxia. Started on empiric antibiotics with Merrem and azithromycin based on severe allergy history. Also started on IV Decadron and baricitinib. Patient requiring nasal cannula. 10/24: Patient had significant worsening of her oxygenation earlier this morning. Quickly escalated from 5 L nasal cannula to a maximum high flow nasal cannula. Patient required BiPAP therapy later in the day. Essentially BiPAP dependent overnight. 10/25: Patient states she feels like she is breathing relatively okay. Remains BiPAP dependent at this time. Denies any chest pain or palpitations. 10/27: I assumed care of this patient today. She is non-toxic in appearance, however she still remains tachypneic maxed out on BiPAP. We discussed the possible need for intubation if she continues this and pt is agreeable. Denies CP. Does not feel SOB and denies a cough. No fever/chills. Subjective (past 24 hours):   10/28: breathing appears to be slightly improved today, still episodes of tachypnea. attempted to wean to high flow, however unable to maintain saturations. Dietitian consult for nutrition. Past medical history, family history, social history and allergies reviewed again and is unchanged since admission. ROS (12 point review of systems completed. Pertinent positives noted.  Otherwise ROS is negative)     Medications:  Reviewed    Infusion Medications    dextrose       Scheduled Medications    LORazepam  0.5 mg IntraVENous Nightly    insulin lispro  0-18 Units SubCUTAneous TID     insulin lispro  0-9 Units SubCUTAneous Nightly    insulin glargine  18 Units SubCUTAneous Nightly    [START ON 10/29/2021] dexamethasone  10 mg IntraVENous Daily    azithromycin  500 mg IntraVENous Q24H    meropenem  1,000 mg IntraVENous Q8H    amLODIPine  5 mg Oral Daily    aspirin  81 mg Oral Daily    levothyroxine  75 mcg Oral Daily    metoprolol succinate  50 mg Oral Daily    atorvastatin  10 mg Oral Daily    sodium chloride flush  5-40 mL IntraVENous 2 times per day    enoxaparin  30 mg SubCUTAneous Q12H    baricitinib  2 mg Oral Daily     PRN Meds: albuterol sulfate HFA, glucose, dextrose, glucagon (rDNA), dextrose, ondansetron **OR** ondansetron, polyethylene glycol, acetaminophen **OR** acetaminophen      Intake/Output Summary (Last 24 hours) at 10/28/2021 1411  Last data filed at 10/28/2021 0416  Gross per 24 hour   Intake 106.12 ml   Output 875 ml   Net -768.88 ml       Diet:  ADULT DIET; Regular; 3 carb choices (45 gm/meal); Low Sodium (2 gm)    Exam:  /78   Pulse 81   Temp 97.8 °F (36.6 °C) (Oral)   Resp 22   Ht 5' 2\" (1.575 m)   Wt 170 lb 9.6 oz (77.4 kg)   SpO2 96%   BMI 31.20 kg/m²   General appearance: Acutely ill-appearing, elderly, no apparent distress, appears stated age and cooperative. HEENT: Pupils equal, round, and reactive to light. Conjunctivae/corneas clear. Neck: Supple, with full range of motion. No jugular venous distention. Trachea midline. Respiratory: BiPAP. Tachypnea noted with respiratory rate between 22-25. Diminished bibasilar breath sounds. Mild bibasilar rhonchi. Cardiovascular: Regular rate and rhythm with normal S1/S2 without murmurs, rubs or gallops. Abdomen: Soft, non-tender, non-distended with normal bowel sounds. Musculoskeletal: passive and active ROM x 4 extremities. Skin: Skin color, texture, turgor normal.  No rashes or lesions. Neurologic:  Neurovascularly intact without any focal sensory/motor deficits.  Cranial nerves: II-XII intact, grossly non-focal.  Psychiatric: Alert and oriented x3, thought content appropriate, normal insight  Capillary Refill: Brisk,< 3 seconds   Peripheral Pulses: +2 palpable, equal bilaterally     Labs:   Recent Labs     10/26/21  0559 10/27/21  0551 10/28/21  0621   WBC 22.5* 29.3* 20.4*   HGB 15.3 15.9 16.1*   HCT 45.8 47.0 48.0*    283 249     Recent Labs     10/26/21  0559 10/27/21  0551 10/28/21  0621   * 133* 132*   K 4.7 4.4 5.0   CL 99 97* 98   CO2 22* 23 20*   BUN 38* 32* 42*   CREATININE 0.8 0.8 0.8   CALCIUM 9.1 8.7 9.2     No results for input(s): AST, ALT, BILIDIR, BILITOT, ALKPHOS in the last 72 hours. No results for input(s): INR in the last 72 hours. No results for input(s): Katcristela Hinkle in the last 72 hours. Microbiology:    Blood culture #1:   Lab Results   Component Value Date    BC No growth-preliminary  10/24/2021       Blood culture #2:No results found for: Derryl Drum    Organism:  Lab Results   Component Value Date    ORG Growth of Contaminants 10/21/2021       No results found for: LABGRAM    MRSA culture only:No results found for: [facility]    Urine culture: No results found for: LABURIN    Respiratory culture: No results found for: CULTRESP    Aerobic and Anaerobic :  No results found for: LABAERO  No results found for: LABANAE    Urinalysis:      Lab Results   Component Value Date    NITRU NEGATIVE 10/21/2021    WBCUA 25-50 10/21/2021    BACTERIA FEW 10/21/2021    RBCUA 0-2 10/21/2021    BLOODU MODERATE 10/21/2021    GLUCOSEU 250 10/21/2021       Radiology:  CTA CHEST W WO CONTRAST   Final Result   1. No pulmonary emboli are seen. Findings of pulmonary chill hypertension. 2. 2 cm nodule/mass lesion right upper lobe anteriorly, possibly an inflammatory nodule. Repeat CT thorax in 3-6 months would be helpful for reevaluation of this nodule. 3. Relatively severe groundglass infiltrates throughout both lungs, consistent with Covid pneumonia/pulmonary edema.  Overall appearance has significantly worsened since prior study. **This report has been created using voice recognition software. It may contain minor errors which are inherent in voice recognition technology. **          Final report electronically signed by Dr. Kelly Johnson on 10/25/2021 3:18 PM      XR CHEST PORTABLE   Final Result   Interval worsening of patchy opacities at the lung bases. **This report has been created using voice recognition software. It may contain minor errors which are inherent in voice recognition technology. **      Final report electronically signed by Dr. Acosta Blood MD on 10/24/2021 8:11 AM      CTA CHEST W WO CONTRAST   Final Result       1. No evidence of pulmonary embolus. 2. Diffuse patchy groundglass opacities more pronounced at the periphery and lung bases as evidence for atypical infectious/inflammatory process to include viral pneumonia. 3. Masslike opacity in the right upper lobe anteriorly with linear and patchy opacities extending to the pleura. This likely represents the same infectious/inflammatory process as the other groundglass opacities. However, underlying neoplasm can't be    excluded. Recommend short-term follow-up imaging to resolution. **This report has been created using voice recognition software. It may contain minor errors which are inherent in voice recognition technology. **      Final report electronically signed by Dr. Acosta Blood MD on 10/21/2021 2:10 PM      CT ABDOMEN PELVIS W IV CONTRAST Additional Contrast? None   Final Result       1. No evidence of acute intra-abdominal or intrapelvic abnormality. 2. Multiple renal cystic lesions, not fully characterized on this exam. A cystic lesion at the inferior pole of the left kidney adjacent fat stranding towards follow-up with multiphase CT or MRI to exclude an enhancing component. 3. Prominent colonic diverticulosis. 4. Hepatic steatosis. 5. Small to moderate hiatal hernia. **This report has been created using voice recognition software. It may contain minor errors which are inherent in voice recognition technology. **      Final report electronically signed by Dr. Nino Voss MD on 10/21/2021 2:16 PM      XR CHEST PORTABLE   Final Result      1. A 4.9 x 3.3 cm right upper lobe mass is seen along the right paratracheal region. A CT of the chest, abdomen, and pelvis with contrast is recommended for further evaluation. 2. Other nodular opacities are noted in both lungs that felt to be related to the underlying lung mass. 3. Mild increase in pulmonary interstitial markings. **This report has been created using voice recognition software. It may contain minor errors which are inherent in voice recognition technology. **      Final report electronically signed by Dr Mauri Esquivel on 10/21/2021 11:58 AM        CTA CHEST W WO CONTRAST    Result Date: 10/21/2021  PROCEDURE: CTA CHEST W WO CONTRAST CLINICAL INFORMATION: right upper lobe mass, hypoxic. Shortness of breath. COMPARISON: Chest radiograph from the same date. TECHNIQUE: 3 mm axial images were obtained through the chest during fast bolus administration of 80 mL Isovue-370 injected in the right forearm. A non-contrast localizer was obtained. 3D reconstructions were performed on the scanner to include MIP coronal and sagittal images through the chest. All CT scans at this facility use dose modulation, iterative reconstruction, and/or weight-based dosing when appropriate to reduce radiation dose to as low as reasonably achievable. FINDINGS:  There is good contrast bolus within the pulmonary arteries, adequate for evaluation to the subsegmental level. There are no focal filling defects within the pulmonary arteries to suggest pulmonary embolus. There is mild mural calcification in the aortic  arch. No aneurysm or dissection is identified.  No axillary, mediastinal or hilar lymphadenopathy is identified. There is an elongated irregular masslike density in the anterior aspect of the right upper lobe with adjacent linear and patchy opacities extending to the pleura. This appears to correspond to the masslike density on previous chest radiograph. There are prominent patchy groundglass opacities in both lungs at the periphery. There are more confluent groundglass opacities at the posterior lung bases, right greater than left. There is a small-to-moderate hiatal hernia. There are calcified granulomas in the spleen. There is a dextrocurvature of the thoracic spine. There are moderate degenerative changes of the spine. No suspicious osseous lesion is identified. 1. No evidence of pulmonary embolus. 2. Diffuse patchy groundglass opacities more pronounced at the periphery and lung bases as evidence for atypical infectious/inflammatory process to include viral pneumonia. 3. Masslike opacity in the right upper lobe anteriorly with linear and patchy opacities extending to the pleura. This likely represents the same infectious/inflammatory process as the other groundglass opacities. However, underlying neoplasm can't be excluded. Recommend short-term follow-up imaging to resolution. **This report has been created using voice recognition software. It may contain minor errors which are inherent in voice recognition technology. ** Final report electronically signed by Dr. Karo Velasquez MD on 10/21/2021 2:10 PM    CT ABDOMEN PELVIS W IV CONTRAST Additional Contrast? None    Result Date: 10/21/2021  PROCEDURE: CT ABDOMEN PELVIS W IV CONTRAST CLINICAL INFORMATION: Lung Mass . COMPARISON: None. TECHNIQUE: Axial 5 mm CT images were obtained through the abdomen and pelvis after the administration of 80 mL Isovue-370 injected in the right forearm with sagittal and coronal reconstructions.  All CT scans at this facility use dose modulation, iterative reconstruction, and/or weight-based dosing when appropriate to reduce radiation dose to as low as reasonably achievable. FINDINGS:  There is a small to moderate-sized hiatal hernia. The liver is diffusely hypodense without focal lesion identified. The gallbladder is surgically absent. The extrahepatic bile duct is prominent throughout possibly on the basis of postsurgical dilatation. Adrenal glands are unremarkable. There are multiple hypodense cystic lesions of both kidneys which are not fully characterized on this exam. There is a irregular hypodense lesion at the inferior pole of left kidney with adjacent stranding in the fat. There are calcified granulomas in the spleen. The pancreas is partially fatty replaced. No retroperitoneal or mesenteric lymphadenopathy is identified. There are numerous diverticula within the large bowel without definite adjacent inflammation to suggest diverticulitis. Small bowel appears within normal limits. The unopacified bladder is unremarkable. The uterus appears to be surgically absent. No free  fluid is identified. There are mild-to-moderate degenerative changes of the lumbar spine. No suspicious osseous lesion is identified. 1. No evidence of acute intra-abdominal or intrapelvic abnormality. 2. Multiple renal cystic lesions, not fully characterized on this exam. A cystic lesion at the inferior pole of the left kidney adjacent fat stranding towards follow-up with multiphase CT or MRI to exclude an enhancing component. 3. Prominent colonic diverticulosis. 4. Hepatic steatosis. 5. Small to moderate hiatal hernia. **This report has been created using voice recognition software. It may contain minor errors which are inherent in voice recognition technology. ** Final report electronically signed by Dr. Tracey Evans MD on 10/21/2021 2:16 PM    XR CHEST PORTABLE    Result Date: 10/21/2021  PROCEDURE: XR CHEST PORTABLE CLINICAL INFORMATION: Shortness of breath COMPARISON: None TECHNIQUE: AP portable chest radiograph performed.  FINDINGS: There is a 4.9 x 3.3 cm masslike abnormality in the right paratracheal region. Increase in pulmonary markings are seen. There is vague nodular opacities in both lungs more on the right. Cardiac silhouette is not enlarged. No pleural effusion. No pneumothorax. No acute bony abnormality. Scoliosis. Degenerative changes of the thoracic spine. The bones appear demineralized. 1. A 4.9 x 3.3 cm right upper lobe mass is seen along the right paratracheal region. A CT of the chest, abdomen, and pelvis with contrast is recommended for further evaluation. 2. Other nodular opacities are noted in both lungs that felt to be related to the underlying lung mass. 3. Mild increase in pulmonary interstitial markings. **This report has been created using voice recognition software. It may contain minor errors which are inherent in voice recognition technology. ** Final report electronically signed by Dr Cristela Francis on 10/21/2021 11:58 AM      Electronically signed by Cosme Cosme PA-C on 10/28/2021 at 2:11 PM

## 2021-10-29 NOTE — PROGRESS NOTES
Hospitalist Progress Note      Patient:  Morelia Sherman    Unit/Bed:8B-22/022-A  YOB: 1940  MRN: [id number]   Acct: [de-identified]   PCP: CESAR Leavitt CNP  Date of Admission: 10/21/2021    Assessment/Plan:    1. Acute hypoxic respiratory failure, worsening: secondary to #2. Continue to wean O2 as tolerated. Increased likelihood of intubation given tachypnea, although this seems to have improved over the past 12 hours or so. Monitor closely for now for decompensation. 2. COVID-19 pneumonia:  a. Covid directed therapy:  i. Decadron 10 mg on 10/21. High dose with 20 mg on 10/24-current  ii. Baricitinib 10/21-current  b. Incentive spirometer and Acapella valve as able  c. CT angiogram of the chest repeated on 10/25. Shows worsening of the patient's groundglass opacities. No evidence of PE.  d. Patient has had discussions with myself, nursing staff, palliative care regarding her CODE STATUS. She is definitively said that she would be okay with intubation. She remains full code at this time. Continue with BiPAP and wean if tolerated.  e. We will start maintenance IV fluids with D5W/half-normal saline as patient has been unable to come off the BiPAP over the previous 72 hours. Dietitian consult for nutrition concerns. 3. Concern for superimposed bacterial pneumonia  a. Continue with empiric azithromycin and Merrem given severe allergic reaction to cephalosporins. Will treat for 7 days. 4. History of HFpEF, not in acute exacerbation:   a. Continue home Norvasc, aspirin, Lipitor, metoprolol  5. Hyperlipidemia:   a. Continue Lipitor  6. Hypertension:   a. Continue home medications  7. Diabetes mellitus type 2:  a. Blood glucose better controlled. Does have some mild hyperglycemia secondary to glucocorticoid use.  b. Continue basal bolus insulin. c. Continue SSI and Accu-Cheks. 8. Hypothyroidism:   a. Continue home Synthroid  9.  Suspicious pulmonary mass  a. Patient with possible pulmonary mass in the right upper lobe of her lung. b. Apparently has been there before and is relatively stable. Will need outpatient follow-up pending resolution of acute illness. Disposition: Unknown at this time. Currently BiPAP dependent. Chief Complaint: Shortness of breath    Hospital Course:    10/21: Patient presents to the hospital with complaints of shortness of breath. Found to be COVID-19 positive with hypoxia. Started on empiric antibiotics with Merrem and azithromycin based on severe allergy history. Also started on IV Decadron and baricitinib. Patient requiring nasal cannula. 10/24: Patient had significant worsening of her oxygenation earlier this morning. Quickly escalated from 5 L nasal cannula to a maximum high flow nasal cannula. Patient required BiPAP therapy later in the day. Essentially BiPAP dependent overnight. 10/25: Patient states she feels like she is breathing relatively okay. Remains BiPAP dependent at this time. Denies any chest pain or palpitations. 10/27: I assumed care of this patient today. She is non-toxic in appearance, however she still remains tachypneic maxed out on BiPAP. We discussed the possible need for intubation if she continues this and pt is agreeable. Denies CP. Does not feel SOB and denies a cough. No fever/chills. 10/28: breathing appears to be slightly improved today, still episodes of tachypnea. attempted to wean to high flow, however unable to maintain saturations. Dietitian consult for nutrition. Subjective (past 24 hours):   10/29: pt doing okay, her respiratory status is much improved when she is sleeping/resting, however upon entering the room her RR goes into the mid 20s. Attempt to wean to high flow today. Denies feeling SOB. Past medical history, family history, social history and allergies reviewed again and is unchanged since admission.     ROS (12 point review of systems completed. Pertinent positives noted. Otherwise ROS is negative)     Medications:  Reviewed    Infusion Medications    dextrose       Scheduled Medications    LORazepam  0.5 mg IntraVENous Nightly    insulin lispro  0-18 Units SubCUTAneous TID WC    insulin lispro  0-9 Units SubCUTAneous Nightly    insulin glargine  18 Units SubCUTAneous Nightly    dexamethasone  10 mg IntraVENous Daily    amLODIPine  5 mg Oral Daily    aspirin  81 mg Oral Daily    levothyroxine  75 mcg Oral Daily    metoprolol succinate  50 mg Oral Daily    atorvastatin  10 mg Oral Daily    sodium chloride flush  5-40 mL IntraVENous 2 times per day    enoxaparin  30 mg SubCUTAneous Q12H    baricitinib  2 mg Oral Daily     PRN Meds: albuterol sulfate HFA, glucose, dextrose, glucagon (rDNA), dextrose, ondansetron **OR** ondansetron, polyethylene glycol, acetaminophen **OR** acetaminophen      Intake/Output Summary (Last 24 hours) at 10/29/2021 0913  Last data filed at 10/29/2021 6489  Gross per 24 hour   Intake --   Output 1250 ml   Net -1250 ml       Diet:  ADULT DIET; Regular; 3 carb choices (45 gm/meal); Low Sodium (2 gm)    Exam:  BP (!) 145/70   Pulse 98   Temp 98 °F (36.7 °C) (Axillary)   Resp 28   Ht 5' 2\" (1.575 m)   Wt 170 lb 9.6 oz (77.4 kg)   SpO2 97%   BMI 31.20 kg/m²   General appearance: Acutely ill-appearing, elderly, no apparent distress, appears stated age and cooperative. HEENT: Pupils equal, round, and reactive to light. Conjunctivae/corneas clear. Neck: Supple, with full range of motion. No jugular venous distention. Trachea midline. Respiratory: BiPAP. Tachypnea noted with respiratory rate between 22-25. Diminished bibasilar breath sounds. Mild bibasilar rhonchi. Cardiovascular: Regular rate and rhythm with normal S1/S2 without murmurs, rubs or gallops. Abdomen: Soft, non-tender, non-distended with normal bowel sounds. Musculoskeletal: passive and active ROM x 4 extremities.   Skin: Skin color, texture, turgor normal.  No rashes or lesions. Neurologic:  Neurovascularly intact without any focal sensory/motor deficits. Cranial nerves: II-XII intact, grossly non-focal.  Psychiatric: Alert and oriented x3, thought content appropriate, normal insight  Capillary Refill: Brisk,< 3 seconds   Peripheral Pulses: +2 palpable, equal bilaterally     Labs:   Recent Labs     10/27/21  0551 10/28/21  0621 10/29/21  0557   WBC 29.3* 20.4* 19.9*   HGB 15.9 16.1* 15.4   HCT 47.0 48.0* 45.4    249 281     Recent Labs     10/27/21  0551 10/28/21  0621 10/29/21  0557   * 132* 144   K 4.4 5.0 5.0   CL 97* 98 106   CO2 23 20* 22*   BUN 32* 42* 48*   CREATININE 0.8 0.8 0.9   CALCIUM 8.7 9.2 10.0     No results for input(s): AST, ALT, BILIDIR, BILITOT, ALKPHOS in the last 72 hours. No results for input(s): INR in the last 72 hours. No results for input(s): Kaleen Hope in the last 72 hours. Microbiology:    Blood culture #1:   Lab Results   Component Value Date    BC No growth-preliminary  10/24/2021       Blood culture #2:No results found for: Mackenzie Jamie    Organism:  Lab Results   Component Value Date    ORG Growth of Contaminants 10/21/2021       No results found for: LABGRAM    MRSA culture only:No results found for: [facility]    Urine culture: No results found for: LABURIN    Respiratory culture: No results found for: CULTRESP    Aerobic and Anaerobic :  No results found for: LABAERO  No results found for: LABANAE    Urinalysis:      Lab Results   Component Value Date    NITRU NEGATIVE 10/21/2021    WBCUA 25-50 10/21/2021    BACTERIA FEW 10/21/2021    RBCUA 0-2 10/21/2021    BLOODU MODERATE 10/21/2021    GLUCOSEU 250 10/21/2021       Radiology:  CTA CHEST W WO CONTRAST   Final Result   1. No pulmonary emboli are seen. Findings of pulmonary chill hypertension. 2. 2 cm nodule/mass lesion right upper lobe anteriorly, possibly an inflammatory nodule.  Repeat CT thorax in 3-6 months would be helpful for reevaluation of this nodule. 3. Relatively severe groundglass infiltrates throughout both lungs, consistent with Covid pneumonia/pulmonary edema. Overall appearance has significantly worsened since prior study. **This report has been created using voice recognition software. It may contain minor errors which are inherent in voice recognition technology. **          Final report electronically signed by Dr. Jenny Fernando on 10/25/2021 3:18 PM      XR CHEST PORTABLE   Final Result   Interval worsening of patchy opacities at the lung bases. **This report has been created using voice recognition software. It may contain minor errors which are inherent in voice recognition technology. **      Final report electronically signed by Dr. Sheilah Pallas, MD on 10/24/2021 8:11 AM      CTA CHEST W WO CONTRAST   Final Result       1. No evidence of pulmonary embolus. 2. Diffuse patchy groundglass opacities more pronounced at the periphery and lung bases as evidence for atypical infectious/inflammatory process to include viral pneumonia. 3. Masslike opacity in the right upper lobe anteriorly with linear and patchy opacities extending to the pleura. This likely represents the same infectious/inflammatory process as the other groundglass opacities. However, underlying neoplasm can't be    excluded. Recommend short-term follow-up imaging to resolution. **This report has been created using voice recognition software. It may contain minor errors which are inherent in voice recognition technology. **      Final report electronically signed by Dr. Sheilah Pallas, MD on 10/21/2021 2:10 PM      CT ABDOMEN PELVIS W IV CONTRAST Additional Contrast? None   Final Result       1. No evidence of acute intra-abdominal or intrapelvic abnormality.    2. Multiple renal cystic lesions, not fully characterized on this exam. A cystic lesion at the inferior pole of the left kidney adjacent fat stranding towards follow-up with multiphase CT or MRI to exclude an enhancing component. 3. Prominent colonic diverticulosis. 4. Hepatic steatosis. 5. Small to moderate hiatal hernia. **This report has been created using voice recognition software. It may contain minor errors which are inherent in voice recognition technology. **      Final report electronically signed by Dr. Ashwin Wallace MD on 10/21/2021 2:16 PM      XR CHEST PORTABLE   Final Result      1. A 4.9 x 3.3 cm right upper lobe mass is seen along the right paratracheal region. A CT of the chest, abdomen, and pelvis with contrast is recommended for further evaluation. 2. Other nodular opacities are noted in both lungs that felt to be related to the underlying lung mass. 3. Mild increase in pulmonary interstitial markings. **This report has been created using voice recognition software. It may contain minor errors which are inherent in voice recognition technology. **      Final report electronically signed by Dr Paramjit Beebe on 10/21/2021 11:58 AM        CTA CHEST W WO CONTRAST    Result Date: 10/21/2021  PROCEDURE: CTA CHEST W WO CONTRAST CLINICAL INFORMATION: right upper lobe mass, hypoxic. Shortness of breath. COMPARISON: Chest radiograph from the same date. TECHNIQUE: 3 mm axial images were obtained through the chest during fast bolus administration of 80 mL Isovue-370 injected in the right forearm. A non-contrast localizer was obtained. 3D reconstructions were performed on the scanner to include MIP coronal and sagittal images through the chest. All CT scans at this facility use dose modulation, iterative reconstruction, and/or weight-based dosing when appropriate to reduce radiation dose to as low as reasonably achievable. FINDINGS:  There is good contrast bolus within the pulmonary arteries, adequate for evaluation to the subsegmental level.  There are no focal filling defects within the pulmonary arteries to suggest pulmonary embolus. There is mild mural calcification in the aortic  arch. No aneurysm or dissection is identified. No axillary, mediastinal or hilar lymphadenopathy is identified. There is an elongated irregular masslike density in the anterior aspect of the right upper lobe with adjacent linear and patchy opacities extending to the pleura. This appears to correspond to the masslike density on previous chest radiograph. There are prominent patchy groundglass opacities in both lungs at the periphery. There are more confluent groundglass opacities at the posterior lung bases, right greater than left. There is a small-to-moderate hiatal hernia. There are calcified granulomas in the spleen. There is a dextrocurvature of the thoracic spine. There are moderate degenerative changes of the spine. No suspicious osseous lesion is identified. 1. No evidence of pulmonary embolus. 2. Diffuse patchy groundglass opacities more pronounced at the periphery and lung bases as evidence for atypical infectious/inflammatory process to include viral pneumonia. 3. Masslike opacity in the right upper lobe anteriorly with linear and patchy opacities extending to the pleura. This likely represents the same infectious/inflammatory process as the other groundglass opacities. However, underlying neoplasm can't be excluded. Recommend short-term follow-up imaging to resolution. **This report has been created using voice recognition software. It may contain minor errors which are inherent in voice recognition technology. ** Final report electronically signed by Dr. Melida Restrepo MD on 10/21/2021 2:10 PM    CT ABDOMEN PELVIS W IV CONTRAST Additional Contrast? None    Result Date: 10/21/2021  PROCEDURE: CT ABDOMEN PELVIS W IV CONTRAST CLINICAL INFORMATION: Lung Mass . COMPARISON: None.  TECHNIQUE: Axial 5 mm CT images were obtained through the abdomen and pelvis after the administration of 80 mL Isovue-370 injected in the right forearm with sagittal and coronal reconstructions. All CT scans at this facility use dose modulation, iterative reconstruction, and/or weight-based dosing when appropriate to reduce radiation dose to as low as reasonably achievable. FINDINGS:  There is a small to moderate-sized hiatal hernia. The liver is diffusely hypodense without focal lesion identified. The gallbladder is surgically absent. The extrahepatic bile duct is prominent throughout possibly on the basis of postsurgical dilatation. Adrenal glands are unremarkable. There are multiple hypodense cystic lesions of both kidneys which are not fully characterized on this exam. There is a irregular hypodense lesion at the inferior pole of left kidney with adjacent stranding in the fat. There are calcified granulomas in the spleen. The pancreas is partially fatty replaced. No retroperitoneal or mesenteric lymphadenopathy is identified. There are numerous diverticula within the large bowel without definite adjacent inflammation to suggest diverticulitis. Small bowel appears within normal limits. The unopacified bladder is unremarkable. The uterus appears to be surgically absent. No free  fluid is identified. There are mild-to-moderate degenerative changes of the lumbar spine. No suspicious osseous lesion is identified. 1. No evidence of acute intra-abdominal or intrapelvic abnormality. 2. Multiple renal cystic lesions, not fully characterized on this exam. A cystic lesion at the inferior pole of the left kidney adjacent fat stranding towards follow-up with multiphase CT or MRI to exclude an enhancing component. 3. Prominent colonic diverticulosis. 4. Hepatic steatosis. 5. Small to moderate hiatal hernia. **This report has been created using voice recognition software. It may contain minor errors which are inherent in voice recognition technology. ** Final report electronically signed by Dr. Acosta Blood MD on 10/21/2021 2:16 PM    XR CHEST PORTABLE    Result Date: 10/21/2021  PROCEDURE: XR CHEST PORTABLE CLINICAL INFORMATION: Shortness of breath COMPARISON: None TECHNIQUE: AP portable chest radiograph performed. FINDINGS: There is a 4.9 x 3.3 cm masslike abnormality in the right paratracheal region. Increase in pulmonary markings are seen. There is vague nodular opacities in both lungs more on the right. Cardiac silhouette is not enlarged. No pleural effusion. No pneumothorax. No acute bony abnormality. Scoliosis. Degenerative changes of the thoracic spine. The bones appear demineralized. 1. A 4.9 x 3.3 cm right upper lobe mass is seen along the right paratracheal region. A CT of the chest, abdomen, and pelvis with contrast is recommended for further evaluation. 2. Other nodular opacities are noted in both lungs that felt to be related to the underlying lung mass. 3. Mild increase in pulmonary interstitial markings. **This report has been created using voice recognition software. It may contain minor errors which are inherent in voice recognition technology. ** Final report electronically signed by Dr Genet Moon on 10/21/2021 11:58 AM      Electronically signed by Ashley Sanches PA-C on 10/29/2021 at 9:13 AM

## 2021-10-29 NOTE — PROGRESS NOTES
Patient made several attempts getting out of bed and also pulled BiPAP off. Notified Dannielle REYES who discussed with patient about oxygen need and consequences of taking BiPAP off. This RN asked for telesitter.

## 2021-10-30 NOTE — PROGRESS NOTES
Called and updated granddeana MonetBuffalorishi Castillo on patient condition. He was very thankful for taking care of his grandmother. Answered all questions and concerning.

## 2021-10-30 NOTE — PROGRESS NOTES
Comprehensive Nutrition Assessment    Type and Reason for Visit:  Initial, Consult (poor po)    Nutrition Recommendations/Plan:   -continue to offer oral diet as per MD order: 3 carb choices, 2 gm Na  -ONS of ensure enlive started TID: RN reports she will give drinks of ONS when giving meds as patient not able to tolerate being off bipap  -will monitor blood sugars/labs  -may need to consider EN since patient does not tolerate being off bipap to consume meals: Vital 1.2 with a goal of 55ml/hr = 1320ml, 1584 kcals, 99 grams protein, 146 grams CHO, 7 grams fiber, 1071 ml water. Additional water flushes per MD.    Nutrition Assessment:   Pt. severely malnourished AEB criteria listed below. At risk for further nutritional compromise r/t admitted with hyponatremia, +covid, on bipap, unable to remain off bipap for meals, advanced age, overweight, and underlying medical condition (hx: pulmonary mass not new/stable, former smoker, CAD, CHF, CKD, DM, GERD, Hypercalcemia, Hyperlipidemia, HTN, Low vitamin D, Macrocytosis, Takotsubo cadiomyopathy). Nutrition recommendations/interventions as per above. Malnutrition Assessment:  Malnutrition Status:  Severe malnutrition    Context:  Acute Illness     Findings of the 6 clinical characteristics of malnutrition:  Energy Intake:  7 - 50% or less of estimated energy requirements for 5 or more days (not able to tolerate being of bipap to consume meals)  Weight Loss:  1 - 7.5% over 3 months (9% loss in ~3 months)     Body Fat Loss:  Unable to assess     Muscle Mass Loss:  Unable to assess    Fluid Accumulation:  No significant fluid accumulation     Strength:  Not Performed    Estimated Daily Nutrient Needs:  Energy (kcal):  6223-9388 (30-32 kcals/kg) - late phase; Weight Used for Energy Requirements:  Ideal (50kg - ideal)     Protein (g):  ~100 (~2.0 grams/kg);  Weight Used for Protein Requirements:  Ideal (50kg - ideal)          Nutrition Related Findings:    Patient to Discharge Planning:     Too soon to determine     Electronically signed by Naren Clay RD, LD on 10/30/21 at 1:59 PM EDT    Contact: [phone]

## 2021-10-30 NOTE — PROGRESS NOTES
Hospitalist Progress Note      Patient:  Shikha Gil    Unit/Bed:8B-22/022-A  YOB: 1940  MRN: [id number]   Acct: [de-identified]   PCP: CESAR Muhammad CNP  Date of Admission: 10/21/2021    Assessment/Plan:    1. Acute hypoxic respiratory failure, improving: secondary to #2. Continue to wean O2 as tolerated. Increased likelihood of intubation given tachypnea, although this seems to have improved over the past 12 hours or so. Monitor closely for now for decompensation. 10/30: down to FiO2 80 with SpO2 99%  2. COVID-19 pneumonia:  a. Covid directed therapy:  i. Decadron 10 mg on 10/21. High dose with 20 mg on 10/24-current  ii. Baricitinib 10/21-current  b. Incentive spirometer and Acapella valve as able  c. CT angiogram of the chest repeated on 10/25. Shows worsening of the patient's groundglass opacities. No evidence of PE.  d. Patient has had discussions with myself, nursing staff, palliative care regarding her CODE STATUS. She is definitively said that she would be okay with intubation. She remains full code at this time. Continue with BiPAP and wean if tolerated.  e. We will start maintenance IV fluids with D5W/half-normal saline as patient has been unable to come off the BiPAP over the previous 72 hours. Dietitian consult for nutrition concerns. 3. Concern for superimposed bacterial pneumonia  a. Continue with empiric azithromycin and Merrem given severe allergic reaction to cephalosporins. Will treat for 7 days. 4. History of HFpEF, not in acute exacerbation:   a. Continue home Norvasc, aspirin, Lipitor, metoprolol  5. Hyperlipidemia:   a. Continue Lipitor  6. Hypertension:   a. Continue home medications  7. Diabetes mellitus type 2:  a. Blood glucose better controlled. Does have some mild hyperglycemia secondary to glucocorticoid use.  b. Continue basal bolus insulin. c. Continue SSI and Accu-Cheks.   8. Hypothyroidism: a. Continue home Synthroid  9. Suspicious pulmonary mass  a. Patient with possible pulmonary mass in the right upper lobe of her lung. b. Apparently has been there before and is relatively stable. Will need outpatient follow-up pending resolution of acute illness. Disposition: Unknown at this time. Currently BiPAP dependent. Chief Complaint: Shortness of breath    Hospital Course:    10/21: Patient presents to the hospital with complaints of shortness of breath. Found to be COVID-19 positive with hypoxia. Started on empiric antibiotics with Merrem and azithromycin based on severe allergy history. Also started on IV Decadron and baricitinib. Patient requiring nasal cannula. 10/24: Patient had significant worsening of her oxygenation earlier this morning. Quickly escalated from 5 L nasal cannula to a maximum high flow nasal cannula. Patient required BiPAP therapy later in the day. Essentially BiPAP dependent overnight. 10/25: Patient states she feels like she is breathing relatively okay. Remains BiPAP dependent at this time. Denies any chest pain or palpitations. 10/27: I assumed care of this patient today. She is non-toxic in appearance, however she still remains tachypneic maxed out on BiPAP. We discussed the possible need for intubation if she continues this and pt is agreeable. Denies CP. Does not feel SOB and denies a cough. No fever/chills. 10/28: breathing appears to be slightly improved today, still episodes of tachypnea. attempted to wean to high flow, however unable to maintain saturations. Dietitian consult for nutrition. 10/29: pt doing okay, her respiratory status is much improved when she is sleeping/resting, however upon entering the room her RR goes into the mid 20s. Attempt to wean to high flow today. Denies feeling SOB. Subjective (past 24 hours):   10/30: Respiratory status improved. Patient has been weaning down slowly.   She reports that her breathing feels improved. Dietitian following. Past medical history, family history, social history and allergies reviewed again and is unchanged since admission. ROS (12 point review of systems completed. Pertinent positives noted. Otherwise ROS is negative)     Medications:  Reviewed    Infusion Medications    dextrose       Scheduled Medications    insulin lispro  0-18 Units SubCUTAneous TID WC    insulin lispro  0-9 Units SubCUTAneous Nightly    insulin glargine  18 Units SubCUTAneous Nightly    dexamethasone  10 mg IntraVENous Daily    amLODIPine  5 mg Oral Daily    aspirin  81 mg Oral Daily    levothyroxine  75 mcg Oral Daily    metoprolol succinate  50 mg Oral Daily    atorvastatin  10 mg Oral Daily    sodium chloride flush  5-40 mL IntraVENous 2 times per day    enoxaparin  30 mg SubCUTAneous Q12H    baricitinib  2 mg Oral Daily     PRN Meds: LORazepam, albuterol sulfate HFA, glucose, dextrose, glucagon (rDNA), dextrose, ondansetron **OR** ondansetron, polyethylene glycol, acetaminophen **OR** acetaminophen      Intake/Output Summary (Last 24 hours) at 10/30/2021 1555  Last data filed at 10/30/2021 0320  Gross per 24 hour   Intake 400 ml   Output --   Net 400 ml       Diet:  ADULT DIET; Regular; 3 carb choices (45 gm/meal); Low Sodium (2 gm)  ADULT ORAL NUTRITION SUPPLEMENT; Breakfast, Lunch, Dinner; Standard High Calorie/High Protein Oral Supplement    Exam:  /65   Pulse 80   Temp 97.5 °F (36.4 °C) (Oral)   Resp 25   Ht 5' 2\" (1.575 m)   Wt 170 lb 9.6 oz (77.4 kg)   SpO2 99%   BMI 31.20 kg/m²   General appearance: Acutely ill-appearing, elderly, no apparent distress, appears stated age and cooperative. HEENT: Pupils equal, round, and reactive to light. Conjunctivae/corneas clear. Neck: Supple, with full range of motion. No jugular venous distention. Trachea midline. Respiratory: BiPAP. Tachypnea noted with respiratory rate between 22-25. Diminished bibasilar breath sounds. PELVIS W IV CONTRAST Additional Contrast? None   Final Result       1. No evidence of acute intra-abdominal or intrapelvic abnormality. 2. Multiple renal cystic lesions, not fully characterized on this exam. A cystic lesion at the inferior pole of the left kidney adjacent fat stranding towards follow-up with multiphase CT or MRI to exclude an enhancing component. 3. Prominent colonic diverticulosis. 4. Hepatic steatosis. 5. Small to moderate hiatal hernia. **This report has been created using voice recognition software. It may contain minor errors which are inherent in voice recognition technology. **      Final report electronically signed by Dr. Kendall Perry MD on 10/21/2021 2:16 PM      XR CHEST PORTABLE   Final Result      1. A 4.9 x 3.3 cm right upper lobe mass is seen along the right paratracheal region. A CT of the chest, abdomen, and pelvis with contrast is recommended for further evaluation. 2. Other nodular opacities are noted in both lungs that felt to be related to the underlying lung mass. 3. Mild increase in pulmonary interstitial markings. **This report has been created using voice recognition software. It may contain minor errors which are inherent in voice recognition technology. **      Final report electronically signed by Dr Rocco Banerjee on 10/21/2021 11:58 AM        CTA CHEST W WO CONTRAST    Result Date: 10/21/2021  PROCEDURE: CTA CHEST W WO CONTRAST CLINICAL INFORMATION: right upper lobe mass, hypoxic. Shortness of breath. COMPARISON: Chest radiograph from the same date. TECHNIQUE: 3 mm axial images were obtained through the chest during fast bolus administration of 80 mL Isovue-370 injected in the right forearm. A non-contrast localizer was obtained.   3D reconstructions were performed on the scanner to include MIP coronal and sagittal images through the chest. All CT scans at this facility use dose modulation, iterative reconstruction, and/or weight-based dosing when appropriate to reduce radiation dose to as low as reasonably achievable. FINDINGS:  There is good contrast bolus within the pulmonary arteries, adequate for evaluation to the subsegmental level. There are no focal filling defects within the pulmonary arteries to suggest pulmonary embolus. There is mild mural calcification in the aortic  arch. No aneurysm or dissection is identified. No axillary, mediastinal or hilar lymphadenopathy is identified. There is an elongated irregular masslike density in the anterior aspect of the right upper lobe with adjacent linear and patchy opacities extending to the pleura. This appears to correspond to the masslike density on previous chest radiograph. There are prominent patchy groundglass opacities in both lungs at the periphery. There are more confluent groundglass opacities at the posterior lung bases, right greater than left. There is a small-to-moderate hiatal hernia. There are calcified granulomas in the spleen. There is a dextrocurvature of the thoracic spine. There are moderate degenerative changes of the spine. No suspicious osseous lesion is identified. 1. No evidence of pulmonary embolus. 2. Diffuse patchy groundglass opacities more pronounced at the periphery and lung bases as evidence for atypical infectious/inflammatory process to include viral pneumonia. 3. Masslike opacity in the right upper lobe anteriorly with linear and patchy opacities extending to the pleura. This likely represents the same infectious/inflammatory process as the other groundglass opacities. However, underlying neoplasm can't be excluded. Recommend short-term follow-up imaging to resolution. **This report has been created using voice recognition software. It may contain minor errors which are inherent in voice recognition technology. ** Final report electronically signed by Dr. Oanh Younger MD on 10/21/2021 2:10 PM    CT ABDOMEN PELVIS W IV CONTRAST Additional Contrast? None    Result Date: 10/21/2021  PROCEDURE: CT ABDOMEN PELVIS W IV CONTRAST CLINICAL INFORMATION: Lung Mass . COMPARISON: None. TECHNIQUE: Axial 5 mm CT images were obtained through the abdomen and pelvis after the administration of 80 mL Isovue-370 injected in the right forearm with sagittal and coronal reconstructions. All CT scans at this facility use dose modulation, iterative reconstruction, and/or weight-based dosing when appropriate to reduce radiation dose to as low as reasonably achievable. FINDINGS:  There is a small to moderate-sized hiatal hernia. The liver is diffusely hypodense without focal lesion identified. The gallbladder is surgically absent. The extrahepatic bile duct is prominent throughout possibly on the basis of postsurgical dilatation. Adrenal glands are unremarkable. There are multiple hypodense cystic lesions of both kidneys which are not fully characterized on this exam. There is a irregular hypodense lesion at the inferior pole of left kidney with adjacent stranding in the fat. There are calcified granulomas in the spleen. The pancreas is partially fatty replaced. No retroperitoneal or mesenteric lymphadenopathy is identified. There are numerous diverticula within the large bowel without definite adjacent inflammation to suggest diverticulitis. Small bowel appears within normal limits. The unopacified bladder is unremarkable. The uterus appears to be surgically absent. No free  fluid is identified. There are mild-to-moderate degenerative changes of the lumbar spine. No suspicious osseous lesion is identified. 1. No evidence of acute intra-abdominal or intrapelvic abnormality. 2. Multiple renal cystic lesions, not fully characterized on this exam. A cystic lesion at the inferior pole of the left kidney adjacent fat stranding towards follow-up with multiphase CT or MRI to exclude an enhancing component. 3. Prominent colonic diverticulosis. 4. Hepatic steatosis.  5. Small to moderate hiatal hernia. **This report has been created using voice recognition software. It may contain minor errors which are inherent in voice recognition technology. ** Final report electronically signed by Dr. Bhupendra Knowles MD on 10/21/2021 2:16 PM    XR CHEST PORTABLE    Result Date: 10/21/2021  PROCEDURE: XR CHEST PORTABLE CLINICAL INFORMATION: Shortness of breath COMPARISON: None TECHNIQUE: AP portable chest radiograph performed. FINDINGS: There is a 4.9 x 3.3 cm masslike abnormality in the right paratracheal region. Increase in pulmonary markings are seen. There is vague nodular opacities in both lungs more on the right. Cardiac silhouette is not enlarged. No pleural effusion. No pneumothorax. No acute bony abnormality. Scoliosis. Degenerative changes of the thoracic spine. The bones appear demineralized. 1. A 4.9 x 3.3 cm right upper lobe mass is seen along the right paratracheal region. A CT of the chest, abdomen, and pelvis with contrast is recommended for further evaluation. 2. Other nodular opacities are noted in both lungs that felt to be related to the underlying lung mass. 3. Mild increase in pulmonary interstitial markings. **This report has been created using voice recognition software. It may contain minor errors which are inherent in voice recognition technology. ** Final report electronically signed by Dr Jacque Cade on 10/21/2021 11:58 AM      Electronically signed by Perfecto Hernandez PA-C on 10/30/2021 at 3:55 PM

## 2021-10-31 NOTE — PLAN OF CARE

## 2021-10-31 NOTE — PROGRESS NOTES
Hospitalist Progress Note      Patient:  Sofía Arce    Unit/Bed:8B-22/022-A  YOB: 1940  MRN: [id number]   Acct: [de-identified]   PCP: CESAR Calero CNP  Date of Admission: 10/21/2021    Assessment/Plan:    1. Acute hypoxic respiratory failure, improving: secondary to #2. Continue to wean O2 as tolerated. Increased likelihood of intubation given tachypnea, although this seems to have improved over the past 12 hours or so. Monitor closely for now for decompensation. 10/30: down to FiO2 80 with SpO2 99%  2. COVID-19 pneumonia:  a. Covid directed therapy:  i. Decadron 10 mg on 10/21. High dose with 20 mg on 10/24-current  ii. Baricitinib 10/21-current  b. Incentive spirometer and Acapella valve as able  c. CT angiogram of the chest repeated on 10/25. Shows worsening of the patient's groundglass opacities. No evidence of PE.  d. Patient has had discussions with myself, nursing staff, palliative care regarding her CODE STATUS. She has definitively said that she would be okay with intubation. She remains full code at this time. Continue with BiPAP and wean if tolerated.  e. We will start maintenance IV fluids with D5W/half-normal saline as patient has been unable to come off the BiPAP over the previous 72 hours. Dietitian consult for nutrition concerns. 3. Concern for superimposed bacterial pneumonia  a. Continue with empiric azithromycin and Merrem given severe allergic reaction to cephalosporins. Will treat for 7 days. 4. History of HFpEF, not in acute exacerbation:   a. Continue home Norvasc, aspirin, Lipitor, metoprolol  5. Hyperlipidemia:   a. Continue Lipitor  6. Hypertension:   a. Continue home medications  7. Diabetes mellitus type 2:  a. Blood glucose better controlled. Does have some mild hyperglycemia secondary to glucocorticoid use.  b. Continue basal bolus insulin. c. Continue SSI and Accu-Cheks.   8. Hypothyroidism: a. Continue home Synthroid  9. Severe protein calorie malnutrition: Worsening as patient has not been able to get adequate p.o. intake with BiPAP mask. Dietitian has been consulted. Will likely order NGT. 10. Suspicious pulmonary mass  a. Patient with possible pulmonary mass in the right upper lobe of her lung. b. Apparently has been there before and is relatively stable. Will need outpatient follow-up pending resolution of acute illness. Disposition: Unknown at this time. Currently BiPAP dependent. Chief Complaint: Shortness of breath    Hospital Course:    10/21: Patient presents to the hospital with complaints of shortness of breath. Found to be COVID-19 positive with hypoxia. Started on empiric antibiotics with Merrem and azithromycin based on severe allergy history. Also started on IV Decadron and baricitinib. Patient requiring nasal cannula. 10/24: Patient had significant worsening of her oxygenation earlier this morning. Quickly escalated from 5 L nasal cannula to a maximum high flow nasal cannula. Patient required BiPAP therapy later in the day. Essentially BiPAP dependent overnight. 10/25: Patient states she feels like she is breathing relatively okay. Remains BiPAP dependent at this time. Denies any chest pain or palpitations. 10/27: I assumed care of this patient today. She is non-toxic in appearance, however she still remains tachypneic maxed out on BiPAP. We discussed the possible need for intubation if she continues this and pt is agreeable. Denies CP. Does not feel SOB and denies a cough. No fever/chills. 10/28: breathing appears to be slightly improved today, still episodes of tachypnea. attempted to wean to high flow, however unable to maintain saturations. Dietitian consult for nutrition. 10/29: pt doing okay, her respiratory status is much improved when she is sleeping/resting, however upon entering the room her RR goes into the mid 20s.  Attempt to wean to high flow today. Denies feeling SOB. 10/30: Respiratory status improved. Patient has been weaning down slowly. She reports that her breathing feels improved. Dietitian following. Subjective (past 24 hours):   10/31: Patient again noted to be very tachypneic. Discussed with Hannah Thapa who is patient's grandchild via telephone who states that patient should be intubated if necessary. Unable to fully assess pt mentation, alert to self, location, unsure of year. Pt getting some nutrition per RN via feedings when taking off her BiPAP mask. Past medical history, family history, social history and allergies reviewed again and is unchanged since admission. ROS (12 point review of systems completed. Pertinent positives noted. Otherwise ROS is negative)     Medications:  Reviewed    Infusion Medications    dextrose       Scheduled Medications    insulin lispro  0-18 Units SubCUTAneous TID WC    insulin lispro  0-9 Units SubCUTAneous Nightly    insulin glargine  18 Units SubCUTAneous Nightly    dexamethasone  10 mg IntraVENous Daily    amLODIPine  5 mg Oral Daily    aspirin  81 mg Oral Daily    levothyroxine  75 mcg Oral Daily    metoprolol succinate  50 mg Oral Daily    atorvastatin  10 mg Oral Daily    sodium chloride flush  5-40 mL IntraVENous 2 times per day    enoxaparin  30 mg SubCUTAneous Q12H    baricitinib  2 mg Oral Daily     PRN Meds: LORazepam, albuterol sulfate HFA, glucose, dextrose, glucagon (rDNA), dextrose, ondansetron **OR** ondansetron, polyethylene glycol, acetaminophen **OR** acetaminophen      Intake/Output Summary (Last 24 hours) at 10/31/2021 1137  Last data filed at 10/31/2021 0318  Gross per 24 hour   Intake 350 ml   Output 300 ml   Net 50 ml       Diet:  ADULT ORAL NUTRITION SUPPLEMENT; Breakfast, Lunch, Dinner; Standard High Calorie/High Protein Oral Supplement  ADULT DIET;  Full Liquid    Exam:  /65   Pulse 94   Temp 97.8 °F (36.6 °C) (Axillary)   Resp 30 Ht 5' 2\" (1.575 m)   Wt 170 lb 9.6 oz (77.4 kg)   SpO2 98%   BMI 31.20 kg/m²   General appearance: Acutely ill-appearing, elderly, no apparent distress, appears stated age and cooperative. HEENT: Pupils equal, round, and reactive to light. Conjunctivae/corneas clear. Neck: Supple, with full range of motion. No jugular venous distention. Trachea midline. Respiratory: BiPAP. Tachypnea noted with respiratory rate as high as 30. Diminished bibasilar breath sounds. Mild bibasilar rhonchi. Cardiovascular: Regular rate and rhythm with normal S1/S2 without murmurs, rubs or gallops. Abdomen: Soft, non-tender, non-distended with normal bowel sounds. Musculoskeletal: passive and active ROM x 4 extremities. Skin: Skin color, texture, turgor normal.  No rashes or lesions. Neurologic:  Neurovascularly intact without any focal sensory/motor deficits. Cranial nerves: II-XII intact, grossly non-focal.  Psychiatric: Alert and oriented to self and location, thought content appropriate, normal insight  Capillary Refill: Brisk,< 3 seconds   Peripheral Pulses: +2 palpable, equal bilaterally     Labs:   Recent Labs     10/29/21  0557 10/30/21  0758   WBC 19.9* 24.8*   HGB 15.4 15.9   HCT 45.4 47.2*    228     Recent Labs     10/29/21  0557 10/30/21  0758 10/31/21  0819    139 134*   K 5.0 4.5 4.7    103 100   CO2 22* 23 23   BUN 48* 44* 66*   CREATININE 0.9 0.8 0.9   CALCIUM 10.0 9.6 9.4     No results for input(s): AST, ALT, BILIDIR, BILITOT, ALKPHOS in the last 72 hours. No results for input(s): INR in the last 72 hours. No results for input(s): Edelstein Gey in the last 72 hours.     Microbiology:    Blood culture #1:   Lab Results   Component Value Date    BC No growth-preliminary No growth  10/24/2021       Blood culture #2:No results found for: Taylor Rhodes    Organism:  Lab Results   Component Value Date    ORG Growth of Contaminants 10/21/2021       No results found for: LABGRAM    MRSA culture only:No results found for: [facility]    Urine culture: No results found for: LABURIN    Respiratory culture: No results found for: CULTRESP    Aerobic and Anaerobic :  No results found for: LABAERO  No results found for: LABANAE    Urinalysis:      Lab Results   Component Value Date    NITRU NEGATIVE 10/21/2021    WBCUA 25-50 10/21/2021    BACTERIA FEW 10/21/2021    RBCUA 0-2 10/21/2021    BLOODU MODERATE 10/21/2021    GLUCOSEU 250 10/21/2021       Radiology:  CTA CHEST W WO CONTRAST   Final Result   1. No pulmonary emboli are seen. Findings of pulmonary chill hypertension. 2. 2 cm nodule/mass lesion right upper lobe anteriorly, possibly an inflammatory nodule. Repeat CT thorax in 3-6 months would be helpful for reevaluation of this nodule. 3. Relatively severe groundglass infiltrates throughout both lungs, consistent with Covid pneumonia/pulmonary edema. Overall appearance has significantly worsened since prior study. **This report has been created using voice recognition software. It may contain minor errors which are inherent in voice recognition technology. **          Final report electronically signed by Dr. Eliceo Ivey on 10/25/2021 3:18 PM      XR CHEST PORTABLE   Final Result   Interval worsening of patchy opacities at the lung bases. **This report has been created using voice recognition software. It may contain minor errors which are inherent in voice recognition technology. **      Final report electronically signed by Dr. Bhupendra Knowles MD on 10/24/2021 8:11 AM      CTA CHEST W WO CONTRAST   Final Result       1. No evidence of pulmonary embolus. 2. Diffuse patchy groundglass opacities more pronounced at the periphery and lung bases as evidence for atypical infectious/inflammatory process to include viral pneumonia. 3. Masslike opacity in the right upper lobe anteriorly with linear and patchy opacities extending to the pleura.  This likely represents the same infectious/inflammatory process as the other groundglass opacities. However, underlying neoplasm can't be    excluded. Recommend short-term follow-up imaging to resolution. **This report has been created using voice recognition software. It may contain minor errors which are inherent in voice recognition technology. **      Final report electronically signed by Dr. Devon Lyles MD on 10/21/2021 2:10 PM      CT ABDOMEN PELVIS W IV CONTRAST Additional Contrast? None   Final Result       1. No evidence of acute intra-abdominal or intrapelvic abnormality. 2. Multiple renal cystic lesions, not fully characterized on this exam. A cystic lesion at the inferior pole of the left kidney adjacent fat stranding towards follow-up with multiphase CT or MRI to exclude an enhancing component. 3. Prominent colonic diverticulosis. 4. Hepatic steatosis. 5. Small to moderate hiatal hernia. **This report has been created using voice recognition software. It may contain minor errors which are inherent in voice recognition technology. **      Final report electronically signed by Dr. Devon Lyles MD on 10/21/2021 2:16 PM      XR CHEST PORTABLE   Final Result      1. A 4.9 x 3.3 cm right upper lobe mass is seen along the right paratracheal region. A CT of the chest, abdomen, and pelvis with contrast is recommended for further evaluation. 2. Other nodular opacities are noted in both lungs that felt to be related to the underlying lung mass. 3. Mild increase in pulmonary interstitial markings. **This report has been created using voice recognition software. It may contain minor errors which are inherent in voice recognition technology. **      Final report electronically signed by Dr Jordi Nolen on 10/21/2021 11:58 AM        CTA CHEST W WO CONTRAST    Result Date: 10/21/2021  PROCEDURE: CTA CHEST W WO CONTRAST CLINICAL INFORMATION: right upper lobe mass, hypoxic. Shortness of breath. COMPARISON: Chest radiograph from the same date. TECHNIQUE: 3 mm axial images were obtained through the chest during fast bolus administration of 80 mL Isovue-370 injected in the right forearm. A non-contrast localizer was obtained. 3D reconstructions were performed on the scanner to include MIP coronal and sagittal images through the chest. All CT scans at this facility use dose modulation, iterative reconstruction, and/or weight-based dosing when appropriate to reduce radiation dose to as low as reasonably achievable. FINDINGS:  There is good contrast bolus within the pulmonary arteries, adequate for evaluation to the subsegmental level. There are no focal filling defects within the pulmonary arteries to suggest pulmonary embolus. There is mild mural calcification in the aortic  arch. No aneurysm or dissection is identified. No axillary, mediastinal or hilar lymphadenopathy is identified. There is an elongated irregular masslike density in the anterior aspect of the right upper lobe with adjacent linear and patchy opacities extending to the pleura. This appears to correspond to the masslike density on previous chest radiograph. There are prominent patchy groundglass opacities in both lungs at the periphery. There are more confluent groundglass opacities at the posterior lung bases, right greater than left. There is a small-to-moderate hiatal hernia. There are calcified granulomas in the spleen. There is a dextrocurvature of the thoracic spine. There are moderate degenerative changes of the spine. No suspicious osseous lesion is identified. 1. No evidence of pulmonary embolus. 2. Diffuse patchy groundglass opacities more pronounced at the periphery and lung bases as evidence for atypical infectious/inflammatory process to include viral pneumonia. 3. Masslike opacity in the right upper lobe anteriorly with linear and patchy opacities extending to the pleura.  This likely represents the same infectious/inflammatory process as the other groundglass opacities. However, underlying neoplasm can't be excluded. Recommend short-term follow-up imaging to resolution. **This report has been created using voice recognition software. It may contain minor errors which are inherent in voice recognition technology. ** Final report electronically signed by Dr. Kendall Fisher MD on 10/21/2021 2:10 PM    CT ABDOMEN PELVIS W IV CONTRAST Additional Contrast? None    Result Date: 10/21/2021  PROCEDURE: CT ABDOMEN PELVIS W IV CONTRAST CLINICAL INFORMATION: Lung Mass . COMPARISON: None. TECHNIQUE: Axial 5 mm CT images were obtained through the abdomen and pelvis after the administration of 80 mL Isovue-370 injected in the right forearm with sagittal and coronal reconstructions. All CT scans at this facility use dose modulation, iterative reconstruction, and/or weight-based dosing when appropriate to reduce radiation dose to as low as reasonably achievable. FINDINGS:  There is a small to moderate-sized hiatal hernia. The liver is diffusely hypodense without focal lesion identified. The gallbladder is surgically absent. The extrahepatic bile duct is prominent throughout possibly on the basis of postsurgical dilatation. Adrenal glands are unremarkable. There are multiple hypodense cystic lesions of both kidneys which are not fully characterized on this exam. There is a irregular hypodense lesion at the inferior pole of left kidney with adjacent stranding in the fat. There are calcified granulomas in the spleen. The pancreas is partially fatty replaced. No retroperitoneal or mesenteric lymphadenopathy is identified. There are numerous diverticula within the large bowel without definite adjacent inflammation to suggest diverticulitis. Small bowel appears within normal limits. The unopacified bladder is unremarkable. The uterus appears to be surgically absent. No free  fluid is identified.  There are mild-to-moderate degenerative changes of the lumbar spine. No suspicious osseous lesion is identified. 1. No evidence of acute intra-abdominal or intrapelvic abnormality. 2. Multiple renal cystic lesions, not fully characterized on this exam. A cystic lesion at the inferior pole of the left kidney adjacent fat stranding towards follow-up with multiphase CT or MRI to exclude an enhancing component. 3. Prominent colonic diverticulosis. 4. Hepatic steatosis. 5. Small to moderate hiatal hernia. **This report has been created using voice recognition software. It may contain minor errors which are inherent in voice recognition technology. ** Final report electronically signed by Dr. Rachel Hodge MD on 10/21/2021 2:16 PM    XR CHEST PORTABLE    Result Date: 10/21/2021  PROCEDURE: XR CHEST PORTABLE CLINICAL INFORMATION: Shortness of breath COMPARISON: None TECHNIQUE: AP portable chest radiograph performed. FINDINGS: There is a 4.9 x 3.3 cm masslike abnormality in the right paratracheal region. Increase in pulmonary markings are seen. There is vague nodular opacities in both lungs more on the right. Cardiac silhouette is not enlarged. No pleural effusion. No pneumothorax. No acute bony abnormality. Scoliosis. Degenerative changes of the thoracic spine. The bones appear demineralized. 1. A 4.9 x 3.3 cm right upper lobe mass is seen along the right paratracheal region. A CT of the chest, abdomen, and pelvis with contrast is recommended for further evaluation. 2. Other nodular opacities are noted in both lungs that felt to be related to the underlying lung mass. 3. Mild increase in pulmonary interstitial markings. **This report has been created using voice recognition software. It may contain minor errors which are inherent in voice recognition technology. ** Final report electronically signed by Dr Arturo Kennedy on 10/21/2021 11:58 AM      Electronically signed by Dannielle Live PA-C on 10/31/2021 at 11:37 AM

## 2021-10-31 NOTE — PROGRESS NOTES
Patient was placed on high flow at 60 liters and 100% by respiratory therapist at 1440 and patient tolerated for 2 1/2 hours at 90-93%. Patient grandson Aziza Caterina was here and able to speak with patient and was happy he got to see here.

## 2021-11-01 NOTE — PROGRESS NOTES
Chart reviewed, no new issues for palliative care, we will remain available. Floor to notify PRN for further issues.

## 2021-11-01 NOTE — PROGRESS NOTES
Call to pt grandson Belkisshahlaosavldo Srivastava, updated him on pt status from today. Grandson would like pt chart to be changed to private. Confirmed with patient who verbalized okay. No further questions or concerns at this time.

## 2021-11-01 NOTE — PROGRESS NOTES
glucocorticoid use.  b. Continue basal bolus insulin. c. Continue SSI and Accu-Cheks. 8. Hypothyroidism:   a. Continue home Synthroid  9. Severe protein calorie malnutrition: Worsening as patient has not been able to get adequate p.o. intake with BiPAP mask. Dietitian has been consulted. Will order NGT. Dietitian consult for tube feed management and orders. 10. Suspicious pulmonary mass  a. Patient with possible pulmonary mass in the right upper lobe of her lung. b. Apparently has been there before and is relatively stable. Will need outpatient follow-up pending resolution of acute illness. Disposition: Unknown at this time. Currently BiPAP dependent. Chief Complaint: Shortness of breath    Hospital Course:    10/21: Patient presents to the hospital with complaints of shortness of breath. Found to be COVID-19 positive with hypoxia. Started on empiric antibiotics with Merrem and azithromycin based on severe allergy history. Also started on IV Decadron and baricitinib. Patient requiring nasal cannula. 10/24: Patient had significant worsening of her oxygenation earlier this morning. Quickly escalated from 5 L nasal cannula to a maximum high flow nasal cannula. Patient required BiPAP therapy later in the day. Essentially BiPAP dependent overnight. 10/25: Patient states she feels like she is breathing relatively okay. Remains BiPAP dependent at this time. Denies any chest pain or palpitations. 10/27: I assumed care of this patient today. She is non-toxic in appearance, however she still remains tachypneic maxed out on BiPAP. We discussed the possible need for intubation if she continues this and pt is agreeable. Denies CP. Does not feel SOB and denies a cough. No fever/chills. 10/28: breathing appears to be slightly improved today, still episodes of tachypnea. attempted to wean to high flow, however unable to maintain saturations. Dietitian consult for nutrition.      10/29: pt doing okay, her respiratory status is much improved when she is sleeping/resting, however upon entering the room her RR goes into the mid 20s. Attempt to wean to high flow today. Denies feeling SOB. 10/30: Respiratory status improved. Patient has been weaning down slowly. She reports that her breathing feels improved. Dietitian following. 10/31: Patient again noted to be very tachypneic. Discussed with Ventura Rhoades who is patient's grandchild via telephone who states that patient should be intubated if necessary. Unable to fully assess pt mentation, alert to self, location, unsure of year. Pt getting some nutrition per RN via feedings when taking off her BiPAP mask. Subjective (past 24 hours):    11/1: Patient has been weaned to high flow for a couple hours yesterday. However today she is noted to be desaturating very quickly when her mask is even removed slightly for mouth swabs and/or drinks of water. Dietitian consult for tube feeds versus TPN. Concern for NGT given desaturations. Past medical history, family history, social history and allergies reviewed again and is unchanged since admission. ROS (12 point review of systems completed. Pertinent positives noted.  Otherwise ROS is negative)     Medications:  Reviewed    Infusion Medications    dextrose       Scheduled Medications    insulin lispro  0-18 Units SubCUTAneous TID WC    insulin lispro  0-9 Units SubCUTAneous Nightly    insulin glargine  18 Units SubCUTAneous Nightly    dexamethasone  10 mg IntraVENous Daily    amLODIPine  5 mg Oral Daily    aspirin  81 mg Oral Daily    levothyroxine  75 mcg Oral Daily    metoprolol succinate  50 mg Oral Daily    atorvastatin  10 mg Oral Daily    sodium chloride flush  5-40 mL IntraVENous 2 times per day    enoxaparin  30 mg SubCUTAneous Q12H    baricitinib  2 mg Oral Daily     PRN Meds: LORazepam, albuterol sulfate HFA, glucose, dextrose, glucagon (rDNA), dextrose, ondansetron **OR** ondansetron, polyethylene glycol, acetaminophen **OR** acetaminophen      Intake/Output Summary (Last 24 hours) at 11/1/2021 1330  Last data filed at 11/1/2021 0329  Gross per 24 hour   Intake 650 ml   Output 350 ml   Net 300 ml       Diet:  ADULT ORAL NUTRITION SUPPLEMENT; Breakfast, Lunch, Dinner; Standard High Calorie/High Protein Oral Supplement  ADULT DIET; Full Liquid    Exam:  /65   Pulse 98   Temp 98 °F (36.7 °C) (Axillary)   Resp (!) 32   Ht 5' 2\" (1.575 m)   Wt 170 lb 9.6 oz (77.4 kg)   SpO2 96%   BMI 31.20 kg/m²   General appearance: Acutely ill-appearing, elderly, no apparent distress, appears stated age and cooperative. HEENT: Pupils equal, round, and reactive to light. Conjunctivae/corneas clear. Neck: Supple, with full range of motion. No jugular venous distention. Trachea midline. Respiratory: BiPAP. Tachypnea noted with respiratory rate as high as 30. Diminished bibasilar breath sounds. Mild bibasilar rhonchi. Cardiovascular: Regular rate and rhythm with normal S1/S2 without murmurs, rubs or gallops. Abdomen: Soft, non-tender, non-distended with normal bowel sounds. Musculoskeletal: passive and active ROM x 4 extremities. Skin: Skin color, texture, turgor normal.  No rashes or lesions. Neurologic:  Neurovascularly intact without any focal sensory/motor deficits. Cranial nerves: II-XII intact, grossly non-focal.  Psychiatric: Alert and oriented to self and location, thought content appropriate, normal insight  Capillary Refill: Brisk,< 3 seconds   Peripheral Pulses: +2 palpable, equal bilaterally     Labs:   Recent Labs     10/30/21  0758   WBC 24.8*   HGB 15.9   HCT 47.2*        Recent Labs     10/30/21  0758 10/31/21  0819 11/01/21  0549    134* 136   K 4.5 4.7 4.9    100 100   CO2 23 23 24   BUN 44* 66* 65*   CREATININE 0.8 0.9 1.0   CALCIUM 9.6 9.4 9.5     No results for input(s): AST, ALT, BILIDIR, BILITOT, ALKPHOS in the last 72 hours.   No results for input(s): INR in the last 72 hours. No results for input(s): Bethany Carpio in the last 72 hours. Microbiology:    Blood culture #1:   Lab Results   Component Value Date    BC No growth-preliminary No growth  10/24/2021       Blood culture #2:No results found for: Martha Marrufo    Organism:  Lab Results   Component Value Date    ORG Growth of Contaminants 10/21/2021       No results found for: LABGRAM    MRSA culture only:No results found for: [facility]    Urine culture: No results found for: LABURIN    Respiratory culture: No results found for: CULTRESP    Aerobic and Anaerobic :  No results found for: LABAERO  No results found for: LABANAE    Urinalysis:      Lab Results   Component Value Date    NITRU NEGATIVE 10/21/2021    WBCUA 25-50 10/21/2021    BACTERIA FEW 10/21/2021    RBCUA 0-2 10/21/2021    BLOODU MODERATE 10/21/2021    GLUCOSEU 250 10/21/2021       Radiology:  CTA CHEST W WO CONTRAST   Final Result   1. No pulmonary emboli are seen. Findings of pulmonary chill hypertension. 2. 2 cm nodule/mass lesion right upper lobe anteriorly, possibly an inflammatory nodule. Repeat CT thorax in 3-6 months would be helpful for reevaluation of this nodule. 3. Relatively severe groundglass infiltrates throughout both lungs, consistent with Covid pneumonia/pulmonary edema. Overall appearance has significantly worsened since prior study. **This report has been created using voice recognition software. It may contain minor errors which are inherent in voice recognition technology. **          Final report electronically signed by Dr. Sushil Padilla on 10/25/2021 3:18 PM      XR CHEST PORTABLE   Final Result   Interval worsening of patchy opacities at the lung bases. **This report has been created using voice recognition software. It may contain minor errors which are inherent in voice recognition technology. **      Final report electronically signed by Dr. Taisha Jorge MD on 10/24/2021 8:11 AM      CTA CHEST W WO CONTRAST   Final Result       1. No evidence of pulmonary embolus. 2. Diffuse patchy groundglass opacities more pronounced at the periphery and lung bases as evidence for atypical infectious/inflammatory process to include viral pneumonia. 3. Masslike opacity in the right upper lobe anteriorly with linear and patchy opacities extending to the pleura. This likely represents the same infectious/inflammatory process as the other groundglass opacities. However, underlying neoplasm can't be    excluded. Recommend short-term follow-up imaging to resolution. **This report has been created using voice recognition software. It may contain minor errors which are inherent in voice recognition technology. **      Final report electronically signed by Dr. Melida Restrepo MD on 10/21/2021 2:10 PM      CT ABDOMEN PELVIS W IV CONTRAST Additional Contrast? None   Final Result       1. No evidence of acute intra-abdominal or intrapelvic abnormality. 2. Multiple renal cystic lesions, not fully characterized on this exam. A cystic lesion at the inferior pole of the left kidney adjacent fat stranding towards follow-up with multiphase CT or MRI to exclude an enhancing component. 3. Prominent colonic diverticulosis. 4. Hepatic steatosis. 5. Small to moderate hiatal hernia. **This report has been created using voice recognition software. It may contain minor errors which are inherent in voice recognition technology. **      Final report electronically signed by Dr. Melida Restrepo MD on 10/21/2021 2:16 PM      XR CHEST PORTABLE   Final Result      1. A 4.9 x 3.3 cm right upper lobe mass is seen along the right paratracheal region. A CT of the chest, abdomen, and pelvis with contrast is recommended for further evaluation. 2. Other nodular opacities are noted in both lungs that felt to be related to the underlying lung mass.       3. Mild increase in pulmonary interstitial markings. **This report has been created using voice recognition software. It may contain minor errors which are inherent in voice recognition technology. **      Final report electronically signed by Dr Tamra Rowan on 10/21/2021 11:58 AM        CTA CHEST W WO CONTRAST    Result Date: 10/21/2021  PROCEDURE: CTA CHEST W WO CONTRAST CLINICAL INFORMATION: right upper lobe mass, hypoxic. Shortness of breath. COMPARISON: Chest radiograph from the same date. TECHNIQUE: 3 mm axial images were obtained through the chest during fast bolus administration of 80 mL Isovue-370 injected in the right forearm. A non-contrast localizer was obtained. 3D reconstructions were performed on the scanner to include MIP coronal and sagittal images through the chest. All CT scans at this facility use dose modulation, iterative reconstruction, and/or weight-based dosing when appropriate to reduce radiation dose to as low as reasonably achievable. FINDINGS:  There is good contrast bolus within the pulmonary arteries, adequate for evaluation to the subsegmental level. There are no focal filling defects within the pulmonary arteries to suggest pulmonary embolus. There is mild mural calcification in the aortic  arch. No aneurysm or dissection is identified. No axillary, mediastinal or hilar lymphadenopathy is identified. There is an elongated irregular masslike density in the anterior aspect of the right upper lobe with adjacent linear and patchy opacities extending to the pleura. This appears to correspond to the masslike density on previous chest radiograph. There are prominent patchy groundglass opacities in both lungs at the periphery. There are more confluent groundglass opacities at the posterior lung bases, right greater than left. There is a small-to-moderate hiatal hernia. There are calcified granulomas in the spleen. There is a dextrocurvature of the thoracic spine.  There are moderate degenerative changes of the spine. No suspicious osseous lesion is identified. 1. No evidence of pulmonary embolus. 2. Diffuse patchy groundglass opacities more pronounced at the periphery and lung bases as evidence for atypical infectious/inflammatory process to include viral pneumonia. 3. Masslike opacity in the right upper lobe anteriorly with linear and patchy opacities extending to the pleura. This likely represents the same infectious/inflammatory process as the other groundglass opacities. However, underlying neoplasm can't be excluded. Recommend short-term follow-up imaging to resolution. **This report has been created using voice recognition software. It may contain minor errors which are inherent in voice recognition technology. ** Final report electronically signed by Dr. Sourav Guy MD on 10/21/2021 2:10 PM    CT ABDOMEN PELVIS W IV CONTRAST Additional Contrast? None    Result Date: 10/21/2021  PROCEDURE: CT ABDOMEN PELVIS W IV CONTRAST CLINICAL INFORMATION: Lung Mass . COMPARISON: None. TECHNIQUE: Axial 5 mm CT images were obtained through the abdomen and pelvis after the administration of 80 mL Isovue-370 injected in the right forearm with sagittal and coronal reconstructions. All CT scans at this facility use dose modulation, iterative reconstruction, and/or weight-based dosing when appropriate to reduce radiation dose to as low as reasonably achievable. FINDINGS:  There is a small to moderate-sized hiatal hernia. The liver is diffusely hypodense without focal lesion identified. The gallbladder is surgically absent. The extrahepatic bile duct is prominent throughout possibly on the basis of postsurgical dilatation. Adrenal glands are unremarkable. There are multiple hypodense cystic lesions of both kidneys which are not fully characterized on this exam. There is a irregular hypodense lesion at the inferior pole of left kidney with adjacent stranding in the fat.  There are calcified granulomas in the

## 2021-11-01 NOTE — PROGRESS NOTES
Comprehensive Nutrition Assessment    Type and Reason for Visit:  Reassess, Consult (tubefeeding ordering and management)    Nutrition Recommendations/Plan:   -If able to place NGT, recommend start tubefeedings: Vital 1.2 at 25 ml/hr, increase 10 ml/hr every 4 hours as tolerated to goal of 55 ml/hr.   -Recommend consider 150 mls free water flush every 6 hours if no IVF's or per MD.  -Diet and ONS as respiratory status allows. -TPN/PPN micronutrient nationwide shortages at this time so use is very limited at this time. Nutrition Assessment:    Pt. declining from a nutritional standpoint AEB inability to consume any oral intake d/t declining respiratory status, on bipap and unable to remain off bipap for meals. Remains at risk for further nutritional compromise r/t severe malnutrition, admitted with hyponatremia, +covid 10/21, respiratory distress has been on bipap/high flow, advanced age, overweight, and underlying medical condition (hx: pulmonary mass not new/stable, former smoker, CAD, CHF, CKD, DM, GERD, Hypercalcemia, Hyperlipidemia, HTN, Low vitamin D, Macrocytosis, Takotsubo cadiomyopathy). Nutrition recommendations/interventions as per above. Malnutrition Assessment:  Malnutrition Status:  Severe malnutrition    Context:  Acute Illness     Findings of the 6 clinical characteristics of malnutrition:  Energy Intake:  7 - 50% or less of estimated energy requirements for 5 or more days (not able to tolerate being of bipap to consume meals)  Weight Loss:   (note 8.9% lossin the last 3 months)     Body Fat Loss:  No significant body fat loss     Muscle Mass Loss:  No significant muscle mass loss    Fluid Accumulation:  No significant fluid accumulation     Strength:  Not Performed    Estimated Daily Nutrient Needs:  Energy (kcal):  0969-6589 (30-32 kcals/kg) - late phase; Weight Used for Energy Requirements:  Ideal (50kg - ideal)     Protein (g):  ~100 (~2.0 grams/kg);  Weight Used for Protein Requirements:  Ideal (50kg - ideal)        Fluid (ml/day):  ~ 1925 kcals (25 kcals/kg/day) hx CHF; Method Used for Fluid Requirements:   (77 kg)      Nutrition Related Findings:   Pt seen, on bipap, very sleepy. Yesterday was able to be placed on high flow oxyge for a short time to eat/drink ONS but today hasn't been able to. Very poor oral intake since admit d/t respiratory status. Discussed plan for NGT trial with Marck Corral and RN, pt desats very quickly. Also discussed nationwide micronutrient shortaged, thus PPN/TPN usage is very limited at this time. Most meal intake since admit 25% or less. MAP 80.  11/1: Sodium 136, Potassium 4.9, BUN 65, Creatinine 1, Glucose 215, Chemstick 276. Rx: norvasc, lipitor, dexamethasone, lantus, humalog, synthroid, ativan. BM 10/30/21      Wounds:  None       Current Nutrition Therapies:    ADULT ORAL NUTRITION SUPPLEMENT; Breakfast, Lunch, Dinner; Standard High Calorie/High Protein Oral Supplement  ADULT DIET; Full Liquid  ADULT TUBE FEEDING; Nasogastric; Peptide Based; Continuous; 25; Yes; 10; Q 4 hours; 55; 30; Q 4 hours  Current Tube Feeding (TF) Orders:  · Feeding Route: Nasogastric (NGT to be placed 11/1)  · Formula: Peptide Based (Vital 1.2)  · Schedule: Continuous  · Water Flushes: Recommend consider 150 every 6 hours free water flush or per MD  · Current TF & Flush Orders Provides:  to start  · Goal TF & Flush Orders Provides: Vital 1.2 at 55 ml/hr~ 1584 kcals, 99 grams protein, 146 grams CHO, 7 grams fiber, 1071 mls water/1320 mls volume (1671 mls water/1920 mls volume with recommended free water flush/24 hours      Anthropometric Measures:  · Height: 5' 2\" (157.5 cm)  · Current Body Weight: 170 lb 9.6 oz (77.4 kg) (10/27/21 no edema)   · Admission Body Weight: 190 lb (86.2 kg) (estimated on 10/21/21)    · Usual Body Weight:  (Pt unable to state.   Per EMR: 12/10/20: 188#12.8oz, 7/19/21: 187#)     · Ideal Body Weight: 110 lbs;   · BMI: 31.2  · Adjusted Body

## 2021-11-01 NOTE — PROGRESS NOTES
Updated patient's grandson, Valarie Mckeon, on plan of care. Addressed all questions and concerns at that time.

## 2021-11-02 NOTE — PROGRESS NOTES
Report called to [facility]. Patient transferred to [facility] with RN and RT. All belongings present. Deidre updated.

## 2021-11-02 NOTE — PROGRESS NOTES
Spoke with patient's grandson regarding patient's code status and potential plan for intubation. Patient's grandson was unaware we were still considering intubation because she was weaning down on her oxygen needs. The patient now has an elevated respiratory rate. Will escalate her FiO2 which seemed to help with respiratory rate previously.     Colonel Missy PA-C

## 2021-11-02 NOTE — PROGRESS NOTES
Findings:    +COVID 10/21/21; intubation today, RN reports coffee ground emesis thus not appropriate to begin trophic feeding yet; BMx 1 today; medication includes lantus, humalog high dose corrective algorithm, diprivan; MAP 91, BUN 92, Creatinine 1.5, Glucose 330, Potassium 4.8; prior to intubation very poor oral intake since admit (11 days) due to respiratory status    Wounds:  None       Current Nutrition Therapies:    ADULT ORAL NUTRITION SUPPLEMENT; Breakfast, Lunch, Dinner; Standard High Calorie/High Protein Oral Supplement  ADULT TUBE FEEDING; Nasogastric; Peptide Based; Continuous; 25; Yes; 10; Q 4 hours; 55; 30; Q 4 hours  Current Tube Feeding (TF) Recommendation:  · Feeding Route: Nasogastric (NGT to be placed 11/1)  · Formula: Peptide Based (Vital 1.2)  · Schedule: Continuous  · Water Flushes: as per Doctor  Goal TF & Flush Orders Provides: recommend Vital 1.2 at 10 ml/hr = 288 kcals (655 kcals with Diprivan), 18 gm protein, 27 gm CHO, 1 gm fiber, 395 mg potassium, 241mg phosphorus, 195 ml free water in 240 ml total volume /24hour. Additional Calorie Sources:   Diprivan at 13.9ml/ hour provides 367 lipid kcals/ 24 hours    Anthropometric Measures:  · Height: 5' 2\" (157.5 cm)  · Current Body Weight: 170 lb 9.6 oz (77.4 kg) (10/27/21 no edema)   · Admission Body Weight: 190 lb (86.2 kg) (estimated on 10/21/21)    · Usual Body Weight:  (Pt unable to state. Per EMR: 12/10/20: 188#12.8oz, 7/19/21: 187#)     · Ideal Body Weight: 110 lbs;   · BMI: 31.2  · Adjusted Body Weight:  ; No Adjustment   · BMI Categories: Obese Class 1 (BMI 30.0-34. 9)       Nutrition Diagnosis:   · Inadequate oral intake related to impaired respiratory function as evidenced by intubation, NPO or clear liquid status due to medical condition      Nutrition Interventions:   Food and/or Nutrient Delivery:   (when able to use gut, recommend trophic feed Vital 1.2)  Nutrition Education/Counseling:  No recommendation at this time Coordination of Nutrition Care:  Continue to monitor while inpatient    Goals:  Patient will receive EN appropriate for COVID recovery process       Nutrition Monitoring and Evaluation:   Behavioral-Environmental Outcomes:  None Identified   Food/Nutrient Intake Outcomes:  Enteral Nutrition Intake/Tolerance  Physical Signs/Symptoms Outcomes:  Biochemical Data, Chewing or Swallowing, GI Status, Fluid Status or Edema, Hemodynamic Status, Meal Time Behavior, Skin, Weight, Nutrition Focused Physical Findings     Discharge Planning:     Too soon to determine     Electronically signed by Ramon Vasquez RD, LD on 11/2/21 at 1:36 PM EDT    Contact: [phone]

## 2021-11-02 NOTE — FLOWSHEET NOTE
Patient gold rayshawn ring and red cell phone locked in cabinet at nurses station. Patient label on items.

## 2021-11-02 NOTE — PROGRESS NOTES
Hospitalist Progress Note      Patient:  Margie Simons    Unit/Bed:8B-22/022-A  YOB: 1940  MRN: [id number]   Acct: [de-identified]   PCP: CESAR Ch CNP  Date of Admission: 10/21/2021    Assessment/Plan:    1. Acute hypoxic respiratory failure, worsening: secondary to #2. Continue to wean O2 as tolerated. Increased likelihood of intubation given tachypnea, although this seems to have improved over the past 12 hours or so. Monitor closely for now for decompensation. 10/30: down to FiO2 80 with SpO2 99%  11/1: Continue to wean FiO2 with appropriate saturations, however pt still exhibits tachypnea at times   11/2: tachypnea noted, at times 40 breaths per minute. Transfer for intubation. 2. COVID-19 pneumonia:  a. Covid directed therapy:  i. High dose Decadron with 20 mg on 10/24-10/28, Decadron 10 mg 10/29-11/02  ii. Baricitinib 10/21-current  b. Incentive spirometer and Acapella valve as able, BiPAP dependent currently  c. CT angiogram of the chest repeated on 10/25. Shows worsening of the patient's groundglass opacities. No evidence of PE.  d. Patient has had discussions with myself, nursing staff, palliative care regarding her CODE STATUS. She has definitively said that she would be okay with intubation. She remains full code at this time. Continue with BiPAP and wean if tolerated.  e. We will start maintenance IV fluids with D5W/half-normal saline as patient has been unable to come off the BiPAP. Dietitian consult for nutrition concerns. 3. Concern for superimposed bacterial pneumonia  a. Continue with empiric azithromycin and Merrem given severe allergic reaction to cephalosporins. Will treat for 7 days. 4. History of HFpEF, not in acute exacerbation:   a. Continue home Norvasc, aspirin, Lipitor, metoprolol  5. Hyperlipidemia:   a. Continue Lipitor  6. Hypertension:   a. Continue home medications  7.  Diabetes mellitus type 2:  a. Blood glucose better controlled. Does have some mild hyperglycemia secondary to glucocorticoid use.  b. Continue basal bolus insulin. c. Continue SSI and Accu-Cheks. 8. Hypothyroidism:   a. Continue home Synthroid  9. Severe protein calorie malnutrition: Worsening as patient has not been able to get adequate p.o. intake with BiPAP mask. Dietitian has been consulted. Will order NGT. Dietitian consult for tube feed management and orders. 10. Suspicious pulmonary mass  a. Patient with possible pulmonary mass in the right upper lobe of her lung. b. Apparently has been there before and is relatively stable. Will need outpatient follow-up pending resolution of acute illness. Disposition: Unknown at this time. Currently BiPAP dependent. Chief Complaint: Shortness of breath    Hospital Course:    10/21: Patient presents to the hospital with complaints of shortness of breath. Found to be COVID-19 positive with hypoxia. Started on empiric antibiotics with Merrem and azithromycin based on severe allergy history. Also started on IV Decadron and baricitinib. Patient requiring nasal cannula. 10/24: Patient had significant worsening of her oxygenation earlier this morning. Quickly escalated from 5 L nasal cannula to a maximum high flow nasal cannula. Patient required BiPAP therapy later in the day. Essentially BiPAP dependent overnight. 10/25: Patient states she feels like she is breathing relatively okay. Remains BiPAP dependent at this time. Denies any chest pain or palpitations. 10/27: I assumed care of this patient today. She is non-toxic in appearance, however she still remains tachypneic maxed out on BiPAP. We discussed the possible need for intubation if she continues this and pt is agreeable. Denies CP. Does not feel SOB and denies a cough. No fever/chills. 10/28: breathing appears to be slightly improved today, still episodes of tachypnea.  attempted to wean to high flow, however unable to maintain saturations. Dietitian consult for nutrition. 10/29: pt doing okay, her respiratory status is much improved when she is sleeping/resting, however upon entering the room her RR goes into the mid 20s. Attempt to wean to high flow today. Denies feeling SOB. 10/30: Respiratory status improved. Patient has been weaning down slowly. She reports that her breathing feels improved. Dietitian following. 10/31: Patient again noted to be very tachypneic. Discussed with Dandy Sena who is patient's grandchild via telephone who states that patient should be intubated if necessary. Unable to fully assess pt mentation, alert to self, location, unsure of year. Pt getting some nutrition per RN via feedings when taking off her BiPAP mask. 11/1: Patient has been weaned to high flow for a couple hours yesterday. However today she is noted to be desaturating very quickly when her mask is even removed slightly for mouth swabs and/or drinks of water. Dietitian consult for tube feeds versus TPN. Concern for NGT given desaturations. Subjective (past 24 hours):   11/2: discussed with nocturnist who states that pt was nearing intubation last night, however they attempted to increased FiO2 per holland's request.     Pt remains unchanged, she is breathing upwards of 40 times per minute on the BiPAP. Furthermore she no longer will follow commands and is very lethargic. Pt will be transferred to [facility]ICU for intubation. RN confirmed with holland (in addition to this provider previously) that intubation is the route he would like to take. Past medical history, family history, social history and allergies reviewed again and is unchanged since admission. ROS (12 point review of systems completed. Pertinent positives noted.  Otherwise ROS is negative)     Medications:  Reviewed    Infusion Medications    dextrose       Scheduled Medications    insulin lispro  0-18 Units SubCUTAneous TID WC    insulin lispro  0-9 Units SubCUTAneous Nightly    insulin glargine  18 Units SubCUTAneous Nightly    dexamethasone  10 mg IntraVENous Daily    amLODIPine  5 mg Oral Daily    aspirin  81 mg Oral Daily    levothyroxine  75 mcg Oral Daily    metoprolol succinate  50 mg Oral Daily    atorvastatin  10 mg Oral Daily    sodium chloride flush  5-40 mL IntraVENous 2 times per day    enoxaparin  30 mg SubCUTAneous Q12H    baricitinib  2 mg Oral Daily     PRN Meds: LORazepam, albuterol sulfate HFA, glucose, dextrose, glucagon (rDNA), dextrose, ondansetron **OR** ondansetron, polyethylene glycol, acetaminophen **OR** acetaminophen      Intake/Output Summary (Last 24 hours) at 11/2/2021 5797  Last data filed at 11/1/2021 1935  Gross per 24 hour   Intake --   Output 600 ml   Net -600 ml       Diet:  ADULT ORAL NUTRITION SUPPLEMENT; Breakfast, Lunch, Dinner; Standard High Calorie/High Protein Oral Supplement  ADULT DIET; Full Liquid  ADULT TUBE FEEDING; Nasogastric; Peptide Based; Continuous; 25; Yes; 10; Q 4 hours; 55; 30; Q 4 hours    Exam:  BP 98/76   Pulse 92   Temp 97.7 °F (36.5 °C) (Axillary)   Resp (!) 40   Ht 5' 2\" (1.575 m)   Wt 170 lb 9.6 oz (77.4 kg)   SpO2 100%   BMI 31.20 kg/m²   General appearance: Acutely ill-appearing, elderly, appears stated age, not following commands  HEENT: Pupils equal, round, and reactive to light. Conjunctivae/corneas clear. Neck: Supple, with full range of motion. No jugular venous distention. Trachea midline. Respiratory: BiPAP. Increased respiratory effort. Tachypnea noted with respiratory rate as high as 40. Diminished bibasilar breath sounds. Mild bibasilar rhonchi. Cardiovascular: Regular rate and rhythm with normal S1/S2 without murmurs, rubs or gallops. Abdomen: Soft, non-tender, non-distended with normal bowel sounds. Musculoskeletal: passive and active ROM x 4 extremities. Skin: Skin color, texture, turgor normal.  No rashes or lesions.   Neurologic: Neurovascularly intact without any focal sensory/motor deficits. Cranial nerves: II-XII intact, grossly non-focal.   Psychiatric: lethargic, unable to completely assess. Does not follow commands  Capillary Refill: Brisk,< 3 seconds   Peripheral Pulses: +2 palpable, equal bilaterally     Labs:   No results for input(s): WBC, HGB, HCT, PLT in the last 72 hours. Recent Labs     10/31/21  0819 11/01/21  0549 11/02/21  0712   * 136 142   K 4.7 4.9 4.8    100 105   CO2 23 24 17*   BUN 66* 65* 92*   CREATININE 0.9 1.0 1.5*   CALCIUM 9.4 9.5 9.7     No results for input(s): AST, ALT, BILIDIR, BILITOT, ALKPHOS in the last 72 hours. No results for input(s): INR in the last 72 hours. No results for input(s): Magdalene Vasquez in the last 72 hours. Microbiology:    Blood culture #1:   Lab Results   Component Value Date    BC No growth-preliminary No growth  10/24/2021       Blood culture #2:No results found for: Tima Joshua    Organism:  Lab Results   Component Value Date    ORG Growth of Contaminants 10/21/2021       No results found for: LABGRAM    MRSA culture only:No results found for: [facility]    Urine culture: No results found for: LABURIN    Respiratory culture: No results found for: CULTRESP    Aerobic and Anaerobic :  No results found for: LABAERO  No results found for: LABANAE    Urinalysis:      Lab Results   Component Value Date    NITRU NEGATIVE 10/21/2021    WBCUA 25-50 10/21/2021    BACTERIA FEW 10/21/2021    RBCUA 0-2 10/21/2021    BLOODU MODERATE 10/21/2021    GLUCOSEU 250 10/21/2021       Radiology:  XR CHEST PORTABLE   Final Result   1. Normal heart size. Prior cholecystectomy. 2. NG tube passes into the stomach. 3. Focal soft tissue fullness right paratracheal region which appears related to a tortuous brachiocephalic vessels. 4. There is a 2 cm nodule right midlung anteriorly, better seen on recent CT thorax 5. Mild interstitial pneumonia/edema scattered throughout both lungs.  No effusion. **This report has been created using voice recognition software. It may contain minor errors which are inherent in voice recognition technology. **      Final report electronically signed by Dr. Jennie Casanova on 11/1/2021 5:51 PM      CTA CHEST [address]   Final Result   1. No pulmonary emboli are seen. Findings of pulmonary chill hypertension. 2. 2 cm nodule/mass lesion right upper lobe anteriorly, possibly an inflammatory nodule. Repeat CT thorax in 3-6 months would be helpful for reevaluation of this nodule. 3. Relatively severe groundglass infiltrates throughout both lungs, consistent with Covid pneumonia/pulmonary edema. Overall appearance has significantly worsened since prior study. **This report has been created using voice recognition software. It may contain minor errors which are inherent in voice recognition technology. **          Final report electronically signed by Dr. Jennie Casanova on 10/25/2021 3:18 PM      XR CHEST PORTABLE   Final Result   Interval worsening of patchy opacities at the lung bases. **This report has been created using voice recognition software. It may contain minor errors which are inherent in voice recognition technology. **      Final report electronically signed by Dr. Karo Velasquez MD on 10/24/2021 8:11 AM      CTA CHEST W WO CONTRAST   Final Result       1. No evidence of pulmonary embolus. 2. Diffuse patchy groundglass opacities more pronounced at the periphery and lung bases as evidence for atypical infectious/inflammatory process to include viral pneumonia. 3. Masslike opacity in the right upper lobe anteriorly with linear and patchy opacities extending to the pleura. This likely represents the same infectious/inflammatory process as the other groundglass opacities. However, underlying neoplasm can't be    excluded. Recommend short-term follow-up imaging to resolution.                **This report has been created using voice recognition software. It may contain minor errors which are inherent in voice recognition technology. **      Final report electronically signed by Dr. Malcolm Mooney MD on 10/21/2021 2:10 PM      CT ABDOMEN PELVIS W IV CONTRAST Additional Contrast? None   Final Result       1. No evidence of acute intra-abdominal or intrapelvic abnormality. 2. Multiple renal cystic lesions, not fully characterized on this exam. A cystic lesion at the inferior pole of the left kidney adjacent fat stranding towards follow-up with multiphase CT or MRI to exclude an enhancing component. 3. Prominent colonic diverticulosis. 4. Hepatic steatosis. 5. Small to moderate hiatal hernia. **This report has been created using voice recognition software. It may contain minor errors which are inherent in voice recognition technology. **      Final report electronically signed by Dr. Malcolm Mooney MD on 10/21/2021 2:16 PM      XR CHEST PORTABLE   Final Result      1. A 4.9 x 3.3 cm right upper lobe mass is seen along the right paratracheal region. A CT of the chest, abdomen, and pelvis with contrast is recommended for further evaluation. 2. Other nodular opacities are noted in both lungs that felt to be related to the underlying lung mass. 3. Mild increase in pulmonary interstitial markings. **This report has been created using voice recognition software. It may contain minor errors which are inherent in voice recognition technology. **      Final report electronically signed by Dr Juan Antonio Root on 10/21/2021 11:58 AM        CTA CHEST W WO CONTRAST    Result Date: 10/21/2021  PROCEDURE: CTA CHEST W WO CONTRAST CLINICAL INFORMATION: right upper lobe mass, hypoxic. Shortness of breath. COMPARISON: Chest radiograph from the same date. TECHNIQUE: 3 mm axial images were obtained through the chest during fast bolus administration of 80 mL Isovue-370 injected in the right forearm. A non-contrast localizer was obtained. 3D reconstructions were performed on the scanner to include MIP coronal and sagittal images through the chest. All CT scans at this facility use dose modulation, iterative reconstruction, and/or weight-based dosing when appropriate to reduce radiation dose to as low as reasonably achievable. FINDINGS:  There is good contrast bolus within the pulmonary arteries, adequate for evaluation to the subsegmental level. There are no focal filling defects within the pulmonary arteries to suggest pulmonary embolus. There is mild mural calcification in the aortic  arch. No aneurysm or dissection is identified. No axillary, mediastinal or hilar lymphadenopathy is identified. There is an elongated irregular masslike density in the anterior aspect of the right upper lobe with adjacent linear and patchy opacities extending to the pleura. This appears to correspond to the masslike density on previous chest radiograph. There are prominent patchy groundglass opacities in both lungs at the periphery. There are more confluent groundglass opacities at the posterior lung bases, right greater than left. There is a small-to-moderate hiatal hernia. There are calcified granulomas in the spleen. There is a dextrocurvature of the thoracic spine. There are moderate degenerative changes of the spine. No suspicious osseous lesion is identified. 1. No evidence of pulmonary embolus. 2. Diffuse patchy groundglass opacities more pronounced at the periphery and lung bases as evidence for atypical infectious/inflammatory process to include viral pneumonia. 3. Masslike opacity in the right upper lobe anteriorly with linear and patchy opacities extending to the pleura. This likely represents the same infectious/inflammatory process as the other groundglass opacities. However, underlying neoplasm can't be excluded. Recommend short-term follow-up imaging to resolution.  **This report has been created using voice recognition software. It may contain minor errors which are inherent in voice recognition technology. ** Final report electronically signed by Dr. Mckenna Downing MD on 10/21/2021 2:10 PM    CT ABDOMEN PELVIS W IV CONTRAST Additional Contrast? None    Result Date: 10/21/2021  PROCEDURE: CT ABDOMEN PELVIS W IV CONTRAST CLINICAL INFORMATION: Lung Mass . COMPARISON: None. TECHNIQUE: Axial 5 mm CT images were obtained through the abdomen and pelvis after the administration of 80 mL Isovue-370 injected in the right forearm with sagittal and coronal reconstructions. All CT scans at this facility use dose modulation, iterative reconstruction, and/or weight-based dosing when appropriate to reduce radiation dose to as low as reasonably achievable. FINDINGS:  There is a small to moderate-sized hiatal hernia. The liver is diffusely hypodense without focal lesion identified. The gallbladder is surgically absent. The extrahepatic bile duct is prominent throughout possibly on the basis of postsurgical dilatation. Adrenal glands are unremarkable. There are multiple hypodense cystic lesions of both kidneys which are not fully characterized on this exam. There is a irregular hypodense lesion at the inferior pole of left kidney with adjacent stranding in the fat. There are calcified granulomas in the spleen. The pancreas is partially fatty replaced. No retroperitoneal or mesenteric lymphadenopathy is identified. There are numerous diverticula within the large bowel without definite adjacent inflammation to suggest diverticulitis. Small bowel appears within normal limits. The unopacified bladder is unremarkable. The uterus appears to be surgically absent. No free  fluid is identified. There are mild-to-moderate degenerative changes of the lumbar spine. No suspicious osseous lesion is identified. 1. No evidence of acute intra-abdominal or intrapelvic abnormality.  2. Multiple renal cystic lesions, not fully characterized on this exam. A cystic lesion at the inferior pole of the left kidney adjacent fat stranding towards follow-up with multiphase CT or MRI to exclude an enhancing component. 3. Prominent colonic diverticulosis. 4. Hepatic steatosis. 5. Small to moderate hiatal hernia. **This report has been created using voice recognition software. It may contain minor errors which are inherent in voice recognition technology. ** Final report electronically signed by Dr. Luna Lyman MD on 10/21/2021 2:16 PM    XR CHEST PORTABLE    Result Date: 10/21/2021  PROCEDURE: XR CHEST PORTABLE CLINICAL INFORMATION: Shortness of breath COMPARISON: None TECHNIQUE: AP portable chest radiograph performed. FINDINGS: There is a 4.9 x 3.3 cm masslike abnormality in the right paratracheal region. Increase in pulmonary markings are seen. There is vague nodular opacities in both lungs more on the right. Cardiac silhouette is not enlarged. No pleural effusion. No pneumothorax. No acute bony abnormality. Scoliosis. Degenerative changes of the thoracic spine. The bones appear demineralized. 1. A 4.9 x 3.3 cm right upper lobe mass is seen along the right paratracheal region. A CT of the chest, abdomen, and pelvis with contrast is recommended for further evaluation. 2. Other nodular opacities are noted in both lungs that felt to be related to the underlying lung mass. 3. Mild increase in pulmonary interstitial markings. **This report has been created using voice recognition software. It may contain minor errors which are inherent in voice recognition technology. ** Final report electronically signed by Dr Little Pugh on 10/21/2021 11:58 AM      Electronically signed by Maddie Kilgore PA-C on 11/2/2021 at 8:28 AM

## 2021-11-02 NOTE — PROCEDURES
ICU PROCEDURE - ENDOTRACHEAL INTUBATION    Cristina Goodman     MRN#: [id number]  21      Lovely [email or web address]     : 1940      INDICATION: Acute Hypoxic Respiratory Failure    TIME OUT: taken    Permission obtained, risks/benefits reviewed:    ANESTHESIA:   [x]Ketamine  []Ativan  [] Morphine  [x]Propofol  []Other medications:      ESTIMATED BLOOD LOSS:  None. COMPLICATIONS:  [x]N/A  [] Other:    LARYNGOSCOPIC AIRWAY GRADE (CORMACK-LEHANE):[]1  [x]2a  []2b []3  []4        INTUBATION EQUIPMENT USED:  [x] Direct laryngoscope only    OUTCOME: Successful placement of #  7.5  Taperguard Evac endotracheal via   [x]Oral route    INSERTION DEPTH:  23    cm from   [x]lip           CONFIRMATION OF TUBE POSITION:   []Capnography - Strong & repeatable exhaled CO2 detection   []Multiple point auscultation   []SpO2 response   [x]STAT X-ray   []Bronchoscopic assessment    UNUSUAL FINDINGS:    PROCEDURE:     Using direct laryngoscopy, the vocal cords were visualized and the endotracheal tube was placed through the cords under direct vision. Good breath sounds were auscultated bilaterally without sounds over abdomen. Appropriate strong & repeatable exhaled CO2 detection was confirmed. Electronically signed by Florentino Cummings DO on 2021 at 6:09 PM  I supervised and instructed resident on procedure at bedside. Electronically signed by Jae Welch MD.

## 2021-11-02 NOTE — CONSULTS
Patient:  [facility]    Unit/Bed:3A-04/004-A  MRN: [id number]   PCP: CESAR Steinberg CNP  Date of Admission: 10/21/2021    Assessment and Plan(All pulmonary edema, renal failure, PE, and respiratory failure diagnoses are acute in nature unless otherwise specified):        Acute hypoxic respiratory failure: Secondary to Covid-19. Patient progressed to BiPAP and failed BiPAP therapy, was intubated on 11/2. Goal to maintain peak pressures less than 35. Covid-19 Pneumonia: CXR demonstrating increased density throughout both lung fields. Was previously treated with empiric zith and meropenem for 7 days, D/Domingo on 10/28. Blood cultures on 10/24 demonstrated no growth. Will continue with baricitinib therapy, day 13. Respiratory culture and repeat blood cultures ordered. Pneumonia panel ordered. Sepsis: Tachypneic prior to intubation with leukocytosis and hypotension now requiring pressors. Suspect bacterial infectious source due to leukocytosis of 42.6, unlikely to be due to Covid-19 infection and steroid administration alone. Will obtain pneumonia panel, respiratory culture, repeat blood cultures, and UA with urine culture. Fluid bolus given, completed antibiotic therapy of zith and meropenem on 10/28. LINDSEY: Creatinine 1.5 on AM labs, was 1.0 yesterday. Suspect prerenal in etiology, BUN/Cr ratio >20. Fluid bolus given, will continue to monitor for clinical improvement. Consider bicarb drip if acidosis does not improve and anion gap fails to close. High Anion Gap Metabolic Acidosis: Acidosis noted with CO2 17 and Anion gap of 20. Possible etiologies include Uremia from LINDSEY with BUN of 92 vs sepsis. Fluid bolus given, consider bicarb drip if acidosis or anion gap continues to worsen. Hypotension: Acutely requiring pressor support to maintain blood pressure. Hold home antihypertensive medications, continue to titrate levophed as tolerated. Hx of HFpEF: Does not appear clinically in acute exacerbation. Time was discontiguous. Time does not include procedures. Time does include my direct assessment of the patient and coordination of care.   Electronically signed by Elizabet Phoenix MD on 11/3/2021 at 7:45 AM

## 2021-11-02 NOTE — FLOWSHEET NOTE
11/02/21 4917   Provider Notification   Reason for Communication Evaluate   Provider Name Magdalena Jovel   Provider Notification Advance Practice Clinician (CNS/NP/CNM/CRNA/PA)   Method of Communication Secure Message  (respiratory distress)   Response At bedside   Notification Time [id number] 15: Notified ERIC Demarco of worsening respiratory status. Remains on bipap with respiratory rate around 40 breaths/min. Jaylan Nolen PA spoke to Jaylan Nolen NP regarding need for intubation, order placed for ICU transfer. Spoke to patient's grandson, Sylvia Montes, stated he is okay with intubation.

## 2021-11-03 NOTE — PROGRESS NOTES
[place], NP made aware Pt Lactic 5.4  And having low urine output of 15mL/ hr.    Awaiting new orders at this time. Will continue to monitor Pt  0030 order entered. Loss of IV access. After bolus attempt. Only 1 IV at the moment has Levo and Propofol running. Multiple attempts by Primary RN. ICU team also attempted. Sarina Cheng, made aware unable to run bolus with limited access. Per JASON Alcantara, may need to wait until morning     Resource Nurse called, attempted IV as well.     JASON Alcantara, made aware    [address], NP made aware of Pt Potassium at 5.7 and lactic acid 4.9    No orders received at this time    This note was completed by Jordan Ashley

## 2021-11-03 NOTE — PROGRESS NOTES
Echo was attempted this morning but no images were able to be obtained due to significant lung interference. Dr Rhonda Reed informed, ok to cancel echo today.

## 2021-11-03 NOTE — PROCEDURES
Central Line Placement: Risks and benefits to the procedure were discussed. Alternatives and their risks were discussed as well. Patient was placed in the attention position. Patient was placed in Trendelenburg position. Patient was prepped using Chlorhexidene prep. Patient was draped utilizing sterile gloves, hair net, mask, sterile gown and sterile OR towels. Maximum barrier precautions were utilized. Utilizing the right subclavian approach, patient received 5 cc of 1% lidocaine. Utilizing an introducer needle, it was placed subcutaneously advanced under the clavicle and leveled flat. It was subsequently advanced toward the thoracic inlet, until venous return was obtained. A guide wire was placed through the introducer needle. The introducer needle was subsequently withdrawn leaving the guide wire in place. Utilizing a scalpel, a small incision was made in the skin. A punch dilator was placed over the guide wire and subsequently withdrawn leaving the guide wire in place. A 7 Fr 16\" catheter was subsequently placed over the guide wire. The guide wire was subsequently withdrawn leaving this catheter in place. All ports had good venous return and were subsequently flushed with saline. The catheter was secured using 2.0 silk. Secondary cleansing with chlorhexidine prep was subsequently placed. Tegaderm with bio- patch dressing was utilized for secondary securing and protection. There were no complications. EBL:   Less than 5 mL      Indication:  Sepsis requiring pressor support    Electronically signed by Lianna Ross.  Joyce Longoria MD

## 2021-11-03 NOTE — PROGRESS NOTES
CRITICAL CARE PROGRESS NOTE      Patient:  Belle Zeng    Unit/Bed:3A-04/004-A  YOB: 1940  MRN: [id number]   PCP: CESAR Wang CNP  Date of Admission: 10/21/2021  Chief Complaint:- shortness of breath    Assessment and Plan:    1. Acute hypoxic respiratory failure: Secondary to COVID-19. Patient was noted to have failed BiPAP therapy and was intubated on 11/2/2021. We will maintain lung protective strategies with a peak pressure of less than 35 cmH2O. Paralyzed for ventilator dyssynchrony on 11/3/21  2. COVID-19 pneumonia: Tested positive 10/21/21. Chest x-ray on admission demonstrates bilateral patchy opacities consistent with COVID-19  3. Sepsis:  Tachypnea, leukocytosis noted. Severe leukocytosis likely related to COVID-19 pneumonia and steroid use alone. PNA panel negative, blood, urine and respiratory cultures pending. 4. Hypotension: currently requiring pressor support w/ levophed  5. Acute on chronic heart failure: Ejection fraction unknown, baseline functional status unknown. Patient was on OMT for HFrEF prior to arriving in hospital.  TTE pending. Pulmonary edema noted on chest x-ray. Will continue gentle diuresis as renal function improves. 6. LINDSEY: Suspect prerenal etiology as BUNs/creatinine ratio> 20.    7. High Anion Gap Metabolic acidosis: Compensated w/ respiratory alkalosis. Suspect 2/2 lactic acidosis w/ sepsis and LINDSEY. Negative delta gap suggest concomitant non anion gap metabolic acidosis. No significant GI losses noted. Urine studies pending to evaluate for RTA. S/p bicarb drip  8. NIDDM: Patient is on Lantus 21 units and SSI. Noted to have worsening hyperglycemia 2/2 steroids   9. Hypothyroidism: continue synthroid  10. HTN:  Holding home meds   11. HLD:  Continue lipitor  12.  Pulmonary:  2cm nodule/mass lesion in the RUL on CTA       INITIAL H AND P AND ICU COURSE:  This is an 58-year-old female who presented to [facility] on 10/21/2021 with complaints of shortness of breath. Patient is a former smoker with a past medical history significant for CHF, NIDDM, gout, CAD, hypothyroidism, HTN, HLD. On the morning of admission patient placed a call to EMS for worsening shortness of breath and fever and was brought to ED on 2L NC. Patient reports a 2-week history of worsening fatigue and low energy. On admission to the hospitalist service patient was transitioned to [facility] for respiratory support. 11/2/21 patient was intubated, sedated and placed on mechanical ventilation    Past Medical History:  Per HPI. SHX:  She is a former smoker with a 0.25 pack year history, She denies any drug or alcohol use   FHX: Mother: T2DM, HTN Father: Stroke Sister: T2DM, liver and lung cancer   Allergies: Cephalexin, lisinopril, losartan, fosamax    ROS   A thorough review of systems could not be obtained as patient is sedated and on mechanical ventilation. Scheduled Meds:   sodium chloride  500 mL IntraVENous Once    aspirin  81 mg Per NG tube Daily    lidocaine 1 % injection  5 mL IntraDERmal Once    sodium chloride flush  5-40 mL IntraVENous 2 times per day    insulin glargine  21 Units SubCUTAneous Nightly    insulin lispro  0-18 Units SubCUTAneous TID WC    insulin lispro  0-9 Units SubCUTAneous Nightly    [Held by provider] amLODIPine  5 mg Oral Daily    levothyroxine  75 mcg Oral Daily    metoprolol succinate  50 mg Oral Daily    atorvastatin  10 mg Oral Daily    enoxaparin  30 mg SubCUTAneous Q12H    baricitinib  2 mg Oral Daily     Continuous Infusions:   sodium chloride      midazolam      propofol 40 mcg/kg/min (11/03/21 0706)    norepinephrine 20 mcg/min (11/02/21 2100)    sodium chloride      dextrose         PHYSICAL EXAMINATION:  T:  99.5.  P:  107. RR:  23. B/P:  100/58. FiO2:  60. O2 Sat:  95.  I/O:  0/570  Body mass index is 31.2 kg/m². GCS:   Not testable 2/2 sedation/intubation  PC: 12 PEEP 8: TV: 550: RRTotal: 23:  Ti:1 sec: General: Acute on chronically ill appearing elderly female  HEENT:  normocephalic and atraumatic. No scleral icterus. PERR  Neck: supple. No Thyromegaly. Lungs: Rales appreciated bilaterally. No retractions or paradoxical breathing appreciated  Cardiac: tachycardic. No JVD. Abdomen: soft. Nontender. Extremities:  No clubbing, cyanosis, or edema x 4. Vasculature: capillary refill < 3 seconds. Palpable dorsalis pedis pulses. Skin:  warm and dry. Psych: Unable to assess as patient is currently sedated and on mechanical ventilation  Lymph: Diffuse lymphedema noted. No supraclavicular adenopathy. Neurologic: Exam limited by sedation. No focal deficit. No seizures. Data: (All radiographs, tracings, PFTs, and imaging are personally viewed and interpreted unless otherwise noted).  Sodium 145 potassium 5.7 chloride 112 bicarb 16 BUN/creatinine 106/2.1 anion gap 17 GFR 23 lactic acid 4.9 glucose 144 calcium 9.2    procalcitonin 0.94 CRP 2.8   WBC 37.3 H&H 11.6/37.9 platelets 87    Telemetry shows Sinus tachycardia    CXR 11/3/2021 demonstrates worsening right lower lobe consolidation with overall pulmonary congestion.  TTE 11/3/2021 pending        Seen with multidisciplinary ICU team.  Meets Continued ICU Level Care Criteria:    [x] Yes   [] No - Transfer Planned to listed location:  [] HOSPITALIST CONTACTED-      Case and plan discussed with Dr. Joyce Longoria. Electronically signed by Bonilla Herman DO  [address]  Patient seen by me. Case discussed with resident physician. Remains mechanically ventilator dependent. Continue to recruit alveoli. Italicized font represents changes to the note made by me. CC time 35 minutes. Time was discontiguous. Time does not include procedures. Time does include my direct assessment of the patient and coordination of care.   Electronically signed by Sanju Taylro MD on 11/4/2021 at 6:23 AM

## 2021-11-04 NOTE — PROGRESS NOTES
Comprehensive Nutrition Assessment    Type and Reason for Visit:  Reassess (TF management)    Nutrition Recommendations/Plan:   Increase Vital 1.2 (low cho formula) 10ml every 4h as tolerated to goal of 40ml/hour. TF providing 1.4gms protein/kgm IBW or ~1/kgm actual wt. /24h  Monitoring renal status, GI tolerance, labs, diprivan for EN adjustments as appropriate. Nutrition Assessment:     Pt. Nutritionally compromised AEB NPO, intubated, need for nutrition support.  Remains at risk for further nutritional compromise r/t severe malnutrition, admitted with hyponatremia, +covid 10/21,  had been on bipap/high flow prior to intubation 11/2 with inability to consume much po first 11d of admit, advanced age, overweight, and underlying medical condition (hx: pulmonary mass not new/stable, former smoker, CAD, CHF, CKD, DM - A1c 6.3%, GERD, Hypercalcemia, Hyperlipidemia, HTN, Low vitamin D, Macrocytosis, Takotsubo cadiomyopathy). Malnutrition Assessment:  Malnutrition Status:  Severe malnutrition    Context:  Acute Illness     Findings of the 6 clinical characteristics of malnutrition:  Energy Intake:  7 - 50% or less of estimated energy requirements for 5 or more days (not able to tolerate being of bipap to consume meals)  Weight Loss:   (note 8.9% lossin the last 3 months)     Body Fat Loss:  No significant body fat loss     Muscle Mass Loss:  No significant muscle mass loss    Fluid Accumulation:  No significant fluid accumulation     Strength:  Not Performed    Estimated Daily Nutrient Needs:  Energy (kcal):  1993-1225 (30-32 kcals/kg) - late phase; Weight Used for Energy Requirements:  Ideal (50kg - ideal)     Protein (g):  ~100 (~2.0 grams/kg); Weight Used for Protein Requirements:  Ideal (50kg - ideal)   Monitoring renal status     Fluid (ml/day):  ~ 1925 kcals (25 kcals/kg/day) hx CHF;  Method Used for Fluid Requirements:   (77 kg)      Nutrition Related Findings:     Intubated; RN reports tolerating TF at 20ml/hour this am; now with BUN of 105, Creatinine 2.7 - will closely monitor protein intake; glucose 304 - lantus increased, K+ 5.4  Triglycerides 265  MAP 69  Increased WBC; BMx 1 11/2; medication includes lantus, humalog, levophed, norcuron, glycolax given 11/3, diprivan    Wounds:  Pressure Injury, Stage II (sacrum)       Current Nutrition Therapies:    Current Tube Feeding (TF) Orders:  · Feeding Route: Orogastric  · Formula: Peptide Based (Vital 1.2)  · Schedule: Continuous (11/4 changed goal to 40ml/hour)  · Water Flushes: as per Doctor  · Goal TF & Flush Orders Provides: .1131 kcals (9721 with diprivan lipids), 72gms protein, 106gms cho, 5gms fiber,1579mg K+, 964mg phosphorus,  779ml free H20 in 960ml total volume/24h. Additional Calorie Sources:   diprivan at 14ml/hour provides 370 lipid kcals    Anthropometric Measures:  · Height: 5' 2\" (157.5 cm)  · Current Body Weight: 170 lb 9.6 oz (77.4 kg) (10/27/21 no edema)   · Admission Body Weight: 190 lb (86.2 kg) (estimated on 10/21/21)    · Usual Body Weight:  (Pt unable to state. Per EMR: 12/10/20: 188#12.8oz, 7/19/21: 187#)     · Ideal Body Weight: 110 lbs;   · BMI: 31.2  · Adjusted Body Weight:  ; No Adjustment   · BMI Categories: Obese Class 1 (BMI 30.0-34. 9)       Nutrition Diagnosis:   · Inadequate oral intake related to impaired respiratory function as evidenced by NPO or clear liquid status due to medical condition, intubation, nutrition support - enteral nutrition      Nutrition Interventions:   Food and/or Nutrient Delivery:  Continue NPO, Modify Tube Feeding  Nutrition Education/Counseling:  No recommendation at this time   Coordination of Nutrition Care:  Continue to monitor while inpatient    Goals:  Patient will receive EN appropriate for COVID recovery process       Nutrition Monitoring and Evaluation:   Behavioral-Environmental Outcomes:  None Identified   Food/Nutrient Intake Outcomes:  Enteral Nutrition Intake/Tolerance  Physical Signs/Symptoms Outcomes:  Biochemical Data, Chewing or Swallowing, GI Status, Fluid Status or Edema, Hemodynamic Status, Meal Time Behavior, Skin, Weight, Nutrition Focused Physical Findings     Discharge Planning:     Too soon to determine     Electronically signed by Phyllis Shetty RD, LD on 11/4/21 at 1:31 PM EDT    Contact: [phone]

## 2021-11-04 NOTE — PROGRESS NOTES
Patient:  Jimenez Valenzuela    Unit/Bed:3A-04/004-A  MRN: [id number]   PCP: CESAR Martin CNP  Date of Admission: 10/21/2021    Assessment and Plan(All pulmonary edema, renal failure, PE, and respiratory failure diagnoses are acute in nature unless otherwise specified):        Acute hypoxic respiratory failure: Secondary to Covid-19. Patient was noted to have failed BiPAP therapy and was intubated on 11/2/2021. We will maintain lung protective strategies with a peak pressure of less than 35. Covid-19 Pneumonia: Tested positive for Covid-19 on 10/21/21. CXR demonstrating increased density throughout both lung fields. Was previously treated with empiric zith and meropenem for 7 days, D/Domingo on 10/28. Respiratory culture, blood cultures and pneumonia panel all negative. Completed decadron and baricitinib therapy. ARDS: P/F Ratio positive for severe ARDS. Secondary to Covid-19. Continue with lung protection strategies. Sepsis: Tachypnea, leukocytosis, tachycardia still noted. Significant leukocytosis, cultures remain negative so likely secondary to Covid-19 infection and steroid use. Blood cultures, respiratory culture, and pneumonia panel negative. LINDSEY: Creatinine 2.7, was 1 on admission. Suspect prerenal etiology as BUN/Cr ratio >20. Nephrology consulted, appreciate the recs. Hypotension: Currently requiring pressor support with levophed. Continue to wean as tolerated. Hx of HFpEF: EF unknown, echo attempted but no images were able to be obtained due to significant lung interference. Consider gentle diuresis as renal function improves. Continue to monitor. High Anion Gap Metabolic Acidosis, resolved: CO2 25 and anion gap 11 on AM labs, anion gap has closed. Likely secondary to lactic acidosis with sepsis and LINDSEY. S/p bicarb drip, if acidosis returns or anion gap returns consider resuming bicarb drip. Nephrology consulted.    HTN: Holding home meds  HLD: Continue Lipitor  T2DM: Increased lantus to 25 units nightly, continue with high dose sliding scale. Increased hyperglycemia likely secondary to recent steroid use. Hypothyroidism:  Continue synthroid  Pulmonary Nodule: 2 cm nodule/mass lesion in the right upper lobe anteriorly noted on CTA. . Work up outpatient, recommended repeat CT in 3-6 months for reevaluation. CC:  SOB, Covid-19  HPI: Patient presented to [facility] on 10/21 with complaints of shortness of breath and was found to be Covid-19 positive and hypoxic. She was empirically started on meropenem and zith and was started on IV decadron and baricitinib. Antibiotic therapy was D/Domingo on 10/28 after 7 days of therapy. Her oxygen requirements increased on 10/24 from 5L NC to high flow nasal cannula and eventually BiPAP later that evening due to worsening hypoxia. Patient was intubated on 11/2 with consent from [address] [place] [address]. She was transferred to the ICU for further care. 11/4: LINDSEY continued to worsen, will consult nephrology due to LINDSEY and hypernatremia and hyperkalemia. TOF 0/4 with minimal dose of vecuronium, will D/C paralytic. Continues to have significant leukocytosis, but all cultures up to this point have remained negative. Continue with ventilation.      ROS: Unable to obtain due to intubation and sedation  PMH:  Per HPI  SHX:  She is a former smoker with a 0.25 pack year history, She denies any drug or alcohol use  FHX: Mother: T2DM, HTN Father: Stroke Sister: T2DM, liver and lung cancer  Allergies: Cephalexin, lisinopril, losartan, fosamax  Medications:     sodium chloride 60 mL/hr at 11/03/21 2358    vecuronium (NORCURON) infusion Stopped (11/04/21 1430)    norepinephrine (LEVOPHED) infusion 18 mcg/min (11/04/21 1142)    midazolam 3 mg/hr (11/04/21 1440)    propofol 30 mcg/kg/min (11/04/21 1200)    sodium chloride      dextrose        insulin glargine  25 Units SubCUTAneous Nightly    [START ON 11/5/2021] enoxaparin  30 mg SubCUTAneous Q24H    sodium chloride  500 mL IntraVENous Once tiotropium-olodaterol  2 puff Inhalation Daily    aspirin  81 mg Per NG tube Daily    lidocaine 1 % injection  5 mL IntraDERmal Once    sodium chloride flush  5-40 mL IntraVENous 2 times per day    insulin lispro  0-18 Units SubCUTAneous TID WC    insulin lispro  0-9 Units SubCUTAneous Nightly    [Held by provider] amLODIPine  5 mg Oral Daily    levothyroxine  75 mcg Oral Daily    metoprolol succinate  50 mg Oral Daily    atorvastatin  10 mg Oral Daily       Vital Signs:   T: 99.1: P: 105 RR: 22 B/P: 98/51: FiO2: 70: O2 Sat:96: I/O: 4069/820   Body mass index is 31.2 kg/m². Natan Carpio PC: 18/8: TV: 310: RRTotal: 22: Ti:1 sec:   General:   Acute on chronically ill appearing elderly female   HEENT:  normocephalic and atraumatic. No scleral icterus. PERR  Neck: supple. No Thyromegaly. Lungs: clear to auscultation. No retractions  Cardiac: RRR. No JVD. Abdomen: soft. Nontender. Extremities:  No clubbing, cyanosis, or edema x 4. Vasculature: capillary refill < 3 seconds. Palpable dorsalis pedis pulses. Skin:  warm and dry. Psych: Intubated and sedated on mechanical ventilation   Lymph:  No supraclavicular adenopathy. Neurologic:  No focal deficit. No seizures. Data: (All radiographs, tracings, PFTs, and imaging are personally viewed and interpreted unless otherwise noted). WBC 49, Hgb 10.2, Hct 30.5, Platelets 347  Na+ 544, K+ 5.4, Cl 111, CO2 25, , Cr 2.7, glucose 304  CXR 11/4 demonstrated slightly improving alveolar and reticular opacities in the bilateral lungs   CTA 10/25 demonstrated no PE, 2 cm nodule in the right upper lobe anteriorly, and relatively severe groundglass infiltrates throughout both lungs    Electronically signed by Vincenzo Carson DO on 11/4/2021 at 5:29 PM                                            Patient seen by me. Case discussed with resident physician. Persistently critically ill. Chronically ill. Debilitated. .  Italicized font represents changes to the note made by me.  CC time 35 minutes. Time was discontiguous. Time does not include procedures. Time does include my direct assessment of the patient and coordination of care.   Electronically signed by Glen Kim MD on 11/5/2021 at 6:42 AM

## 2021-11-04 NOTE — PROGRESS NOTES
Pharmacy Renal Adjustment    Lorena Sheikh is a 79 y.o. female. Pharmacy renally adjust the following medications per P&T approved policy: enoxaparin    Recent Labs     11/03/21  1130 11/04/21  0630   * 105*   CREATININE 2.9* 2.7*     Estimated Creatinine Clearance: 15 mL/min (A) (based on SCr of 2.7 mg/dL (H)).   Est GFR 17    Height:   Ht Readings from Last 1 Encounters:   10/21/21 5' 2\" (1.575 m)     Weight:  Wt Readings from Last 1 Encounters:   10/27/21 170 lb 9.6 oz (77.4 kg)     Baseline SCr: 0.8    Plan: Adjustments based on renal function:          Decrease enoxaparin 30mg BID to enoxaparin 30mg daily                 Alex Rose, PharmD, BCPS 11/4/2021 10:40 AM

## 2021-11-04 NOTE — PROGRESS NOTES
Spoke with primary RN regarding PICC line insertion order. States she would like to clarify order with physician. To contact IV Therapy as needed.

## 2021-11-04 NOTE — FLOWSHEET NOTE
Pt was unresponsive but was blessed.       11/04/21 1503   Encounter Summary   Services provided to: Patient   Referral/Consult From: Rounding   Continue Visiting Yes  (11/4 NR)   Complexity of Encounter Low   Length of Encounter 15 minutes   Routine   Type Follow up   Assessment Unable to respond   Intervention Prayer

## 2021-11-05 NOTE — CONSULTS
[facility]    [address], [address]  11/5/2021 2:26 PM    Pt Name:    Johanna Westbrook  MRN:     [id number]   [id number]  YOB: 1940  Admit Date:    10/21/2021 11:02 AM  Primary Care Physician:  CESAR Johnson - CNP    CSN Number:   [id number]    Reason for Consult:  Acute kidney injury and hypernatremia  Requesting provider:  Dr. Cirilo Busby    History:   Nephrology notified of this consult today    This is a 54-year-old elderly white female with history of hypertension, diabetes, dyslipidemia who was admitted on 21 October with shortness of breath and was found to be hypoxic secondary to COVID-19 pneumonia. She was initially started on empiric IV antibiotics along with IV steroids. She continued to have worsening respiratory status. She was placed on BiPAP but continued to decline and was subsequently intubated and admitted to ICU. Nephrology has been consulted today for management of acute kidney injury, hypernatremia. Patient is currently intubated. She is on 70% FiO2, PEEP of eight. She is on IV Levophed. She is on IV normal saline. All information obtained by reviewing her medical chart and speaking with the ICU nurse.     Past Medical History:  Past Medical History:   Diagnosis Date    CAD (coronary artery disease) 06/2017    [facility]    CHF (congestive heart failure) ([facility] [address] 2017    Echo 07/06/2017 EF 35-40% -- 08/22/2017 EF 55-60%    CKD (chronic kidney disease)     Diabetes mellitus ([facility] [address]     Elevated parathyroid hormone 08/21/2019    GERD (gastroesophageal reflux disease)     Hypercalcemia     Hyperlipidemia     Hypertension     Hypothyroidism     Low vitamin D level     Macrocytosis 07/27/2020    OA (osteoarthritis)     Osteoporosis 08/2019    DEXA scan FRAX 18.1% / 4.1%    Takotsubo cardiomyopathy 07/20/2017       Past Surgical History:  Past Surgical History:   Procedure Laterality Date    APPENDECTOMY      BREAST BIOPSY      x 3    CARDIAC CATHETERIZATION  06/2017    CHOLECYSTECTOMY      COLONOSCOPY      [place] - date not known    HYSTERECTOMY, TOTAL ABDOMINAL      KNEE ARTHROSCOPY Bilateral     SHOULDER SURGERY         Family History:  Family History   Problem Relation Age of Onset    Diabetes Mother     High Blood Pressure Mother     Stroke Father     Diabetes Sister     Cancer Sister         Liver cancer, lung cancer       Social History:  Social History     Socioeconomic History    Marital status:      Spouse name: Not on file    Number of children: Not on file    Years of education: Not on file    Highest education level: Not on file   Occupational History    Not on file   Tobacco Use    Smoking status: Former Smoker     Packs/day: 0.25     Years: 1.00     Pack years: 0.25    Smokeless tobacco: Never Used   Vaping Use    Vaping Use: Never assessed   Substance and Sexual Activity    Alcohol use: Never    Drug use: Never    Sexual activity: Not Currently   Other Topics Concern    Not on file   Social History Narrative    Not on file     Social Determinants of Health     Financial Resource Strain:     Difficulty of Paying Living Expenses:    Food Insecurity:     Worried About Running Out of Food in the Last Year:     [address] in the Last Year:    Transportation Needs:     Lack of Transportation (Medical):      Lack of Transportation (Non-Medical):    Physical Activity:     Days of Exercise per Week:     Minutes of Exercise per Session:    Stress:     Feeling of Stress :    Social Connections:     Frequency of Communication with Friends and Family:     Frequency of Social Gatherings with Friends and Family:     Attends [employer] Services:     Active Member of Clubs or Organizations:     Attends Club or Organization Meetings:     Marital Status:    Intimate Partner Violence:     Fear of Current or Ex-Partner:     Emotionally Abused:     Physically Abused:     Sexually Abused:        Home Meds:  Prior to Admission medications    Medication Sig Start Date End Date Taking? Authorizing Provider   isosorbide mononitrate (IMDUR) 30 MG extended release tablet Take 1 tablet by mouth once daily 8/16/21   CESAR Melendrez CNP   simvastatin (ZOCOR) 10 MG tablet Take 1 tablet by mouth nightly 7/19/21   CESAR Melendrez CNP   Cholecalciferol (VITAMIN D) 50 MCG (2000 UT) CAPS capsule Take 1 capsule by mouth daily    Historical Provider, MD   metFORMIN (GLUCOPHAGE) 500 MG tablet Take 1 tablet by mouth once daily 7/15/21   CESAR Melendrez CNP   cetirizine (ZYRTEC) 10 MG tablet TAKE 1 TABLET BY MOUTH ONCE DAILY AS NEEDED FOR ALLERGIES 6/1/21   CESAR Melendrez CNP   allopurinol (ZYLOPRIM) 100 MG tablet Take 1 tablet by mouth once daily 4/19/21   CESAR Melendrez CNP   metoprolol succinate (TOPROL XL) 50 MG extended release tablet TAKE 1 TABLET BY MOUTH ONCE DAILY (HAS MARCH APPOINTMENT) 4/19/21   CESAR Melendrez CNP   EUTHYROX 75 MCG tablet Take 1 tablet by mouth once daily 4/19/21   CESAR Melendrez CNP   metoprolol succinate (TOPROL XL) 25 MG extended release tablet TAKE 1 TABLET BY MOUTH ONCE DAILY (TAKE WITH  50  MG  FOR  A  TOTAL  OF  75  MG) 4/19/21   CESAR Melendrez CNP   amLODIPine (NORVASC) 5 MG tablet Take 1 tablet by mouth once daily 4/19/21   CESAR Melendrez CNP   aspirin 81 MG EC tablet Take 81 mg by mouth daily    Historical Provider, MD   Cetirizine HCl (ZYRTEC ALLERGY) 10 MG CAPS TAKE 1 TABLET BY MOUTH ONCE DAILY AS NEEDED FOR ALLERGIES 8/17/20   Historical Provider, MD   loperamide (IMODIUM) 2 MG capsule Take 2 mg by mouth 4 times daily as needed for Diarrhea    Historical Provider, MD       Review of Systems:  Cannot be obtained at this time.     Current Meds:  Infusion:    dextrose      vecuronium (NORCURON) infusion 0.5 mcg/kg/min (11/05/21 0849)    norepinephrine (LEVOPHED) infusion 15 mcg/min (11/05/21 0319)    midazolam 4 mg/hr (11/05/21 0309)    propofol 50 mcg/kg/min (11/05/21 0853)    sodium chloride      dextrose       Meds:    pantoprazole  40 mg IntraVENous BID    senna  2 tablet Oral Nightly    docusate sodium  100 mg Oral BID    [START ON 11/6/2021] heparin (porcine)  5,000 Units SubCUTAneous 3 times per day    insulin glargine  25 Units SubCUTAneous Nightly    sodium chloride  500 mL IntraVENous Once    tiotropium-olodaterol  2 puff Inhalation Daily    aspirin  81 mg Per NG tube Daily    lidocaine 1 % injection  5 mL IntraDERmal Once    sodium chloride flush  5-40 mL IntraVENous 2 times per day    insulin lispro  0-18 Units SubCUTAneous TID WC    insulin lispro  0-9 Units SubCUTAneous Nightly    [Held by provider] amLODIPine  5 mg Oral Daily    levothyroxine  75 mcg Oral Daily    metoprolol succinate  50 mg Oral Daily    atorvastatin  10 mg Oral Daily     Meds prn: sodium chloride flush, sodium chloride, LORazepam, albuterol sulfate HFA, glucose, dextrose, glucagon (rDNA), dextrose, ondansetron **OR** ondansetron, polyethylene glycol, [DISCONTINUED] acetaminophen **OR** acetaminophen     Allergies/Intolerances: ALLERGIES: Cephalexin, Lisinopril, Losartan, Other, and Fosamax [alendronate sodium]    24HR INTAKE/OUTPUT:      Intake/Output Summary (Last 24 hours) at 11/5/2021 1426  Last data filed at 11/5/2021 0552  Gross per 24 hour   Intake 3160 ml   Output 1115 ml   Net 2045 ml     I/O last 3 completed shifts: In: 3160 [I.V.:2216; NG/GT:944]  Out: 1115 [Urine:1115]  No intake/output data recorded. Admission weight: 190 lb (86.2 kg)  Wt Readings from Last 3 Encounters:   10/27/21 170 lb 9.6 oz (77.4 kg)   07/19/21 187 lb (84.8 kg)   03/11/21 187 lb (84.8 kg)     Body mass index is 31.2 kg/m².     Physical Examination:  VITALS:  Blood pressure 118/65, heart rate 102, temperature 99.1  Vitals:    11/05/21 0510 11/05/21 0553 11/05/21 0852 11/05/21 1221   BP:       Pulse: Resp:       Temp:  98.2 °F (36.8 °C)     TempSrc:       SpO2: 92%  90% 90%   Weight:       Height:         Weight:   Wt Readings from Last 3 Encounters:   10/27/21 170 lb 9.6 oz (77.4 kg)   07/19/21 187 lb (84.8 kg)   03/11/21 187 lb (84.8 kg)     Limited examination secondary to COVID-19 positivity and isolation status    Constitutional and General Appearance: Intubated, sedated, paralyzed  Eyes: Mild pallor conjunctiva  Oral: ET tube present  Neck: Multiple lines  Lungs: Diminished at the bases  Heart: S1, S2  Extremities: B/L LE edema, no tenderness  Musculo: No obvious joint deformities noted    Lab Data  CBC:   Recent Labs     11/03/21  0109 11/04/21  0630 11/05/21  0450   WBC 37.3* 49.0* 49.4*   HGB 11.6* 10.2* 8.7*   HCT 37.9 30.5* 26.0*   PLT 87* 186 124*     BMP:  Recent Labs     11/04/21  0630 11/05/21  0145 11/05/21  0449   * 146* 148*   K 5.4* 4.7 4.5    112* 114*   CO2 25 21* 21*   * 102* 99*   CREATININE 2.7* 2.8* 2.7*   GLUCOSE 257* 242* 229*   CALCIUM 8.9 8.5 8.6     PTH: @PTH@  TSH:   Recent Labs     11/05/21  0825   TSH 0.073*     HgBa1c: No results for input(s): LABA1C in the last 72 hours. Hepatic:   Recent Labs     11/04/21  0630   LABALBU 2.4*   AST 40   ALT 66   BILITOT 0.5   ALKPHOS 82     Diagnostics: Chest x-ray shows improving bilateral atelectasis and infiltrate. Chest x-ray personally reviewed by me      Impression and Plan:  1. LINDSEY likely secondary to ischemic/septic ATN in setting of COVID-19 diffuse inflammatory syndrome. Patient is nonoliguric. Urine output 1100 mL in last 24 hours. Continue with supportive management. Avoid hypotension. Keep MAP greater than 65. No immediate need for renal replacement therapy. Check urine electrolytes. Will obtain kidney ultrasound. Serial BMPs  2. Azotemia in setting of LINDSEY and likely due to steroids. Will monitor  3. Mild hypernatremia. Will stop normal saline. Start patient on D5W.   Increase free water 250 mL every 6 hours. Repeat BMP  4. Acute hypoxic respiratory failure secondary to COVID-19 pneumonia  5. ARDS  6. Septic shock with marked leukocytosis. Currently on Levophed  7. IDDM  8. Meds reviewed  9. Discussed with RN    Thank you for the consult. Please feel free to call me if you have any questions. We will make further recommendations depending on evolving clinical course.     Tosin Wright MD  [facility]

## 2021-11-05 NOTE — PROGRESS NOTES
Spoke with primary RN regarding PICC line insertion order. States she discussed with physician and order is to be discontinued.

## 2021-11-05 NOTE — PROGRESS NOTES
Perfect serve    Julito Mcintosh MD   Patient: Waldemar Faulkner"   11/5/21 8:46 AM   [phone] 3A CCU From: Corinda [facility] RE: Elizabeth uriarte RM: [id number] consult please & thank you

## 2021-11-05 NOTE — PROGRESS NOTES
Patient:  Sherrell Villa    Unit/Bed:3A-04/004-A  MRN: [id number]   PCP: CESAR Flores CNP  Date of Admission: 10/21/2021    Assessment and Plan(All pulmonary edema, renal failure, PE, and respiratory failure diagnoses are acute in nature unless otherwise specified):        1. Acute hypoxic respiratory failure: Secondary to Covid-19. Patient was noted to have failed BiPAP therapy and was intubated on 11/2/2021. We will maintain lung protective strategies with a peak pressure of less than 35.   2. Covid-19 Pneumonia: Tested positive for Covid-19 on 10/21/21. CXR demonstrating increased density throughout both lung fields. Was previously treated with empiric zith and meropenem for 7 days, D/Domingo on 10/28. Respiratory culture, blood cultures and pneumonia panel all negative. Completed decadron and baricitinib therapy. 3. ARDS: P/F Ratio positive for severe ARDS. Secondary to Covid-19. Continue with lung protection strategies. 4. Sepsis: Tachypnea, leukocytosis, tachycardia still noted. Significant leukocytosis, cultures remain negative so likely secondary to Covid-19 infection and steroid use vs macrophage activation syndrome. Blood cultures, respiratory culture, and pneumonia panel negative. Continue gentle IVF. Repeat inflammatory markers blood work. 5. LINDSEY: Creatinine 2.8, was 1 on admission. Suspect prerenal etiology as BUN/Cr ratio >20. Continue gentle IVF. Nephrology consulted, appreciate the recs. 6. Hypotension: Currently requiring pressor support with levophed. Continue to wean as tolerated. 7. Macrocytic Anemia: Hgb 8.7 and .2. Order folate, B12, thyroid studies, blood smear. Social history indicates no history of alcohol abuse. Begin protonix 40mg BID IV for stress ulcer prophylaxis. No episodes of hematemesis or hematochezia or dark, tarry stools. No signs of active bleeding. Repeat H&H Q8H, continue to monitor. Transfuse to maintain Hgb >7.   8.  Hx of HFpEF: EF unknown, echo attempted but no images were able to be obtained due to significant lung interference. Continue to monitor. 9. High Anion Gap Metabolic Acidosis, resolved: CO2 21 anion gap remains closed. Likely secondary to lactic acidosis with sepsis and LINDSEY. S/p bicarb drip, if acidosis worsens or anion gap opens consider resuming bicarb drip. Continue gentle IVF. Nephrology consulted. 10. HTN: Holding home meds  11. HLD: Continue Lipitor  12. T2DM: Increased lantus to 25 units nightly, continue with high dose sliding scale. Increased hyperglycemia likely secondary to recent steroid use. 13. Hypothyroidism:  Continue synthroid  14. Pulmonary Nodule: 2 cm nodule/mass lesion in the right upper lobe anteriorly noted on CTA. . Work up outpatient, recommended repeat CT in 3-6 months for reevaluation. CC:  SOB, Covid-19  HPI: Patient presented to [address] [place] [address] on 10/21 with complaints of shortness of breath and was found to be Covid-19 positive and hypoxic. She was empirically started on meropenem and zith and was started on IV decadron and baricitinib. Antibiotic therapy was D/Domingo on 10/28 after 7 days of therapy. Her oxygen requirements increased on 10/24 from 5L NC to high flow nasal cannula and eventually BiPAP later that evening due to worsening hypoxia. Patient was intubated on 11/2 with consent from [address] [place] [address]. She was transferred to the ICU for further care. 11/4: LINDSEY continued to worsen, will consult nephrology due to LINDSEY and hypernatremia and hyperkalemia. TOF 0/4 with minimal dose of vecuronium, will D/C paralytic. Continues to have significant leukocytosis, but all cultures up to this point have remained negative. Continue with ventilation. 11/5: No improvement of LINDSEY, nephrology consulted. Hyperkalemia has resolved and hypernatremia has improved. Hgb continuing to drop, 8.7 this AM and noted to be macrocytic. Will order thyroid studies, B12, folate, blood smear, begin stress ulcer prophylaxis with protonix 40mg BID. Continuing to have significant leukocytosis, will repeat inflammatory markers for possible macrophage activation syndrome. Continue gentle IVF. ROS: Unable to obtain due to intubation and sedation  PMH:  Per HPI  SHX:  She is a former smoker with a 0.25 pack year history, She denies any drug or alcohol use  FHX: Mother: T2DM, HTN Father: Stroke Sister: T2DM, liver and lung cancer  Allergies: Cephalexin, lisinopril, losartan, fosamax  Medications:     sodium chloride 60 mL/hr at 11/05/21 0353    vecuronium (NORCURON) infusion Stopped (11/04/21 1430)    norepinephrine (LEVOPHED) infusion 15 mcg/min (11/05/21 0319)    midazolam 4 mg/hr (11/05/21 0309)    propofol 50 mcg/kg/min (11/05/21 0353)    sodium chloride      dextrose        pantoprazole  40 mg IntraVENous BID    senna  2 tablet Oral Nightly    docusate sodium  100 mg Oral BID    insulin glargine  25 Units SubCUTAneous Nightly    enoxaparin  30 mg SubCUTAneous Q24H    sodium chloride  500 mL IntraVENous Once    tiotropium-olodaterol  2 puff Inhalation Daily    aspirin  81 mg Per NG tube Daily    lidocaine 1 % injection  5 mL IntraDERmal Once    sodium chloride flush  5-40 mL IntraVENous 2 times per day    insulin lispro  0-18 Units SubCUTAneous TID WC    insulin lispro  0-9 Units SubCUTAneous Nightly    [Held by provider] amLODIPine  5 mg Oral Daily    levothyroxine  75 mcg Oral Daily    metoprolol succinate  50 mg Oral Daily    atorvastatin  10 mg Oral Daily       Vital Signs:   T: 100: P: 105 RR: 30 B/P: 98/62: FiO2: 70: O2 Sat:96: I/O: 3160/1115   Body mass index is 31.2 kg/m². Katty Carpio PC: 18/8: TV: 310: RRTotal: 22: Ti:1 sec:   General:   Acute on chronically ill appearing elderly female   HEENT:  normocephalic and atraumatic. No scleral icterus. PERR  Neck: supple. No Thyromegaly. Lungs: clear to auscultation. No retractions  Cardiac: RRR. No JVD. Abdomen: soft. Nontender.   Extremities:  No clubbing, cyanosis, bilateral 1+ pedal

## 2021-11-06 NOTE — PROGRESS NOTES
Patient:  [facility]    Unit/Bed:3A-04/004-A  MRN: [id number]   PCP: CESAR Steinberg CNP  Date of Admission: 10/21/2021    Assessment and Plan(All pulmonary edema, renal failure, PE, and respiratory failure diagnoses are acute in nature unless otherwise specified):        1. Acute hypoxic respiratory failure: Secondary to COVID-19 pneumonia. Patient required intubation 11/2/21 after failure of BiPAP support. Maintain PCV ventilation. Maintain lung protection strategies. Patient with audible cuff leak on exam. No registered cuff leak on the ventilator however. Maintaining adequate tidal volumes. SPO2 maintained at 91%. Will have respiratory therapy assess the cuff pressures. Obtain chest x-ray now to verify ET tube placement. 2. ARDS: Secondary to COVID-19 pneumonia. Undergoing lung protection strategies with VTE goal 4-6 cc/kg and maintenance of peak pressures less than 35 and plateau less than 30. PEEP is down to 8 and FiO2 is 70%. Consider resuming pronation therapy. However, will address cuff leak as above first. Will repeat ABG later today. 3. COVID-19 pneumonia: Positive on admission. Status post Decadron and baricitinib therapy. Inflammatory markers are downtrending. 4. Undifferentiated shock: Appears septic. SIRS was met during her admission. Patient continues to have uptrending leukocytosis. Pro-Jadiel mildly elevated. Blood, urine, respiratory cultures have been no growth to date (last cultures done 11/2/21. Will repeat BAL respiratory culture and pneumonia panel today. Will repeat CXR today to evaluate for any new or worsening consolidations. Will repeat procal in AM. CVC is only 1days old and urine catheter is only 3days old. Obtain echo given hx HFpEF. Cortisol yesterday was WNL. Continue levo to maintain MAP >65.   5. Acute kidney injury: secondary to hypotension. Renal ultrasound negative for acute pathology. Non-oliguric. Cr 2.2, trending down. Neph managing.  No CRRT needed at this time.   6. Acute on chronic macrocytic anemia: Hemoglobin has remained stable at 8.5. Will space out H&H checks to every 12 hours. Folic acid is low, will start supplementation. No active signs of bleeding. Keep hemoglobin >7.0.  7. Mild hypernatremia: Na 149. Free water flushes increased today. D5% increased to 75 mL/h per Neph. Monitor BMP daily. 8. Diabetes mellitus type 2: Uncontrolled. Serum glucose still >300. Recent steroid use, critical illness. Continue Lantus with hold parameters. Increase sliding scale insulin to high-dose every 4 hours. Patient may require insulin drip. 9. Chronic HFpEF: EF unknown, echo attempted but no images were able to be obtained due to significant lung interference. Continue to monitor. Will order echo again today to re-attempt. Net +7L since admission, but intravascularly volume depleted based upon hypernatremia, elevated BUN:Cr ratio, concentrated urine. Continue D5% as above. Strict I/O. Start daily weights (no weight documented since admit). 10. CAD, history of: continue aspirin daily. 11. GERD, history of: Continue Protonix. 12. Hyperlipidemia, history of: Continue statin. 13. Hypertension, history of: Presently hypotensive on pressors. 14. Hypothyroidism, history of: Continue Synthroid. 15. Pulmonary Nodule: 2 cm nodule/mass lesion in the right upper lobe anteriorly noted on CTA. . Work up outpatient, recommended repeat CT in 3-6 months for reevaluation.        CC: COVID-19 pneumonia  HPI: Richard Macias is an 50-year-old female former smoker with past medical history of CAD, chronic HFpEF, CKD, diabetes mellitus type 2, GERD, hyperlipidemia, hypertension, hypothyroidism, osteoarthritis, osteoporosis. Patient presented to [facility] ED on 10/21/2021 with complaints of dyspnea. She was found to be positive for COVID-19. She was hypoxic upon arrival requiring supplemental O2. She was empirically started on meropenem and azithromycin.   She started IV Decadron and baricitinib for COVID-19. She is admitted to the hospitalist service. She completed 7 days of antibiotic therapy on 10/21/2021. There has been no bacterial organisms identified on infectious work-up. On 10/24/2021, patient was requiring increased FiO2 requirements. She was escalated to high flow nasal cannula. She was later started on BiPAP. She eventually required intubation on 11/2/2021 after failure of high flow nasal cannula and BiPAP therapies. Patient was transferred to ICU for further care. She required paralytic therapy for vent synchrony. She developed hypotension requiring vasopressor support. On 11/5, she developed worsening acute kidney injury requiring nephrology consultation. For further details, please see assessment and plan. ROS: Unable to obtain as patient is intubated and mechanically ventilated. PMH:  Per HPI  SHX: Former smoker. Denies alcohol or substance use. FHX: Liver cancer, lung cancer, diabetes, stroke, hypertension. Allergies: Cephalexin, lisinopril, losartan, Fosamax.   Medications:     dextrose 40 mL/hr at 11/06/21 0310    vecuronium (NORCURON) infusion 0.5 mcg/kg/min (11/05/21 2032)    norepinephrine (LEVOPHED) infusion 17 mcg/min (11/06/21 0630)    midazolam 4 mg/hr (11/05/21 2239)    propofol 30 mcg/kg/min (11/06/21 9429)    sodium chloride      dextrose        pantoprazole  40 mg IntraVENous BID    senna  2 tablet Oral Nightly    docusate sodium  100 mg Oral BID    heparin (porcine)  5,000 Units SubCUTAneous 3 times per day    insulin glargine  25 Units SubCUTAneous Nightly    sodium chloride  500 mL IntraVENous Once    tiotropium-olodaterol  2 puff Inhalation Daily    aspirin  81 mg Per NG tube Daily    lidocaine 1 % injection  5 mL IntraDERmal Once    sodium chloride flush  5-40 mL IntraVENous 2 times per day    insulin lispro  0-18 Units SubCUTAneous TID WC    insulin lispro  0-9 Units SubCUTAneous Nightly    [Held by provider] amLODIPine  5 mg Oral Daily    levothyroxine  75 mcg Oral Daily    metoprolol succinate  50 mg Oral Daily    atorvastatin  10 mg Oral Daily       Vital Signs:   T: 97.5: P: 97 RR: 20 B/P: 90/47: FiO2: 70%: O2 Sat: 92%: I/O: 2658/1750 (+908) GCS: 3 (paralyzed, vent, sedated)  Body mass index is 31.2 kg/m². Mehreen Cantujaswant PC: 18/8: TV: 302: RRTotal: 20: Ti:1 sec:     General:   Acute on chronically ill adult female. Appears stated age. HEENT:  normocephalic and atraumatic. No scleral icterus. PERR  Neck: supple. No Thyromegaly. Lungs: clear to auscultation. No retractions  Cardiac: RRR. No JVD. Abdomen: soft. Nontender. Extremities:  No clubbing, cyanosis, or edema x 4. Vasculature: capillary refill < 3 seconds. Palpable dorsalis pedis pulses. Skin:  warm and dry. Psych:  Alert and oriented x3. Affect appropriate  Lymph:  No supraclavicular adenopathy. Neurologic:  No focal deficit. No seizures. Data: (All radiographs, tracings, PFTs, and imaging are personally viewed and interpreted unless otherwise noted).  Telemetry: NSR. Rate 97. No ectopy.  Sodium 149 potassium 5.1 chloride 116 CO2 23 BUN 90 creatinine 2.2 glucose 358 calcium 8.6   CRP 1.61  ferritin 1649   Hemoglobin 8.5 hematocrit 26.9   pH 7.29 PCO2 52 PO2 47 HCO3 25   Urine culture 11/2/21: Preliminary no growth   Respiratory culture 11/2: Normal candie   Molecular pneumonia panel 11/2: Negative   Blood culture 1 11/2: preliminary no growth   Blood culture 2 11/2: Preliminary no growth   Chest x-ray 11/4/2021 report: Improving bilateral atelectasis/infiltrate.  KUB 11/6/2021 report: Nonobstructive bowel gas pattern.  Renal ultrasound 11/5/2021 report: Probable right renal cyst but otherwise unremarkable renal ultrasound. Case discussed with Dr. Monika Rodriguez. Electronically signed by MESFIN Benito              Patient seen by me. Case discussed with NP    CC time 35 minutes.   Time was discontiguous. Time does not include procedures. Time does include my direct assessment of the patient and coordination of care. Respiratory samples resent today for persistently rising leukocytosis. PF ratio less than 150 will initiate proning today.

## 2021-11-06 NOTE — PROGRESS NOTES
[facility]   Nephrology progress note  11/6/2021, 11:42 AM      Pt Name:    Meng Epstein  MRN:     [id number]     [place]:    1940  Admit Date:    10/21/2021 11:02 AM  Primary Care Physician:  CESAR Medrano CNP   Room number  3A-04/004-A    Chief Complaint: Nephrology following for LINDSEY    Subjective:  Patient seen and examined  Intubated and sedated  On 70% FiO2  On Levophed  On D5W  Late entry seen earlier today during rounds    Objective:  24HR INTAKE/OUTPUT:    Intake/Output Summary (Last 24 hours) at 11/6/2021 1142  Last data filed at 11/6/2021 0800  Gross per 24 hour   Intake 2708 ml   Output 1750 ml   Net 958 ml     I/O last 3 completed shifts: In: 2658 [I.V.:2003; NG/GT:655]  Out: 1750 [EOAFU:8407]  I/O this shift: In: 50 [NG/GT:50]  Out: -   Admission weight: 190 lb (86.2 kg)  Wt Readings from Last 3 Encounters:   10/27/21 170 lb 9.6 oz (77.4 kg)   07/19/21 187 lb (84.8 kg)   03/11/21 187 lb (84.8 kg)     Body mass index is 31.2 kg/m². Physical examination  VITALS:     Vitals:    11/06/21 0818 11/06/21 0900 11/06/21 1000 11/06/21 1100   BP:  (!) 103/45 (!) 103/48 (!) 91/48   Pulse:  96 97 96   Resp:  30 (!) 39 26   Temp:       TempSrc:       SpO2: 93% 92% 92% 91%   Weight:       Height:         Limited examination secondary to COVID-19 positivity in isolation status  General Appearance: Ill-appearing, intubated  Mouth/Throat: ET tube in place  Neck: No JVD  Lungs: Diminished  Heart:  S1, S2 heard  GI: soft  Extremities: Bilateral 1+ edema      Lab Data  CBC:   Recent Labs     11/04/21  0630 11/04/21  0630 11/05/21  0450 11/05/21  1800 11/06/21  0058   WBC 49.0*  --  49.4*  --   --    HGB 10.2*   < > 8.7* 8.8* 8.5*   HCT 30.5*   < > 26.0* 26.8* 26.9*     --  124*  --   --     < > = values in this interval not displayed.      BMP:  Recent Labs     11/05/21  0145 11/05/21  0449 11/06/21  0353   * 148* 149*   K 4.7 4.5 5.1   * 114* 116*   CO2 21* 21* 23   * 99* 90*   CREATININE 2.8* 2.7* 2.2*   GLUCOSE 242* 229* 358*   CALCIUM 8.5 8.6 8.6     Hepatic:   Recent Labs     11/04/21 0630   LABALBU 2.4*   AST 40   ALT 66   BILITOT 0.5   ALKPHOS 82         Meds:  Infusion:    dextrose 40 mL/hr at 11/06/21 0310    vecuronium (NORCURON) infusion 0.5 mcg/kg/min (11/05/21 2032)    norepinephrine (LEVOPHED) infusion 17 mcg/min (11/06/21 0630)    midazolam 4 mg/hr (11/05/21 2239)    propofol 30 mcg/kg/min (11/06/21 2395)    sodium chloride      dextrose       Meds:    insulin lispro  0-18 Units SubCUTAneous Q4H    pantoprazole  40 mg IntraVENous BID    senna  2 tablet Oral Nightly    docusate sodium  100 mg Oral BID    heparin (porcine)  5,000 Units SubCUTAneous 3 times per day    insulin glargine  25 Units SubCUTAneous Nightly    sodium chloride  500 mL IntraVENous Once    tiotropium-olodaterol  2 puff Inhalation Daily    aspirin  81 mg Per NG tube Daily    lidocaine 1 % injection  5 mL IntraDERmal Once    sodium chloride flush  5-40 mL IntraVENous 2 times per day    [Held by provider] amLODIPine  5 mg Oral Daily    levothyroxine  75 mcg Oral Daily    metoprolol succinate  50 mg Oral Daily    atorvastatin  10 mg Oral Daily     Meds prn: sodium chloride flush, sodium chloride, LORazepam, albuterol sulfate HFA, glucose, dextrose, glucagon (rDNA), dextrose, ondansetron **OR** ondansetron, polyethylene glycol, [DISCONTINUED] acetaminophen **OR** acetaminophen       Impression and Plan:  1. LINDSEY secondary to ischemic ATN in setting of COVID-19 induced diffuse inflammatory syndrome and septic shock. Patient is nonoliguric-- 1.7 L last 24 hours  Creatinine slowly improving  Continue with current management  Ultrasound nonsignificant  Urine electrolytes reviewed  2. Hypernatremia. We will continue with D5W, increase free water flushes  3. Azotemia: Multifactorial including catabolic state, recent steroids and LINDSEY  4.   Mild metabolic acidosis  5. Acute hypoxic respiratory failure secondary to COVID-19 pneumonia  6. ARDS  7. Septic shock with marked leukocytosis  8.   IDDM        Darvin Hyde MD  [facility]

## 2021-11-07 NOTE — PLAN OF CARE
Problem: OXYGENATION/RESPIRATORY FUNCTION  Goal: Patient will maintain patent airway  Outcome: Ongoing     Problem: OXYGENATION/RESPIRATORY FUNCTION  Goal: Patient will achieve/maintain normal respiratory rate/effort  Description: Respiratory rate and effort will be within normal limits for the patient  Outcome: Ongoing     Problem: MECHANICAL VENTILATION  Goal: Patient will maintain patent airway  Outcome: Ongoing     Problem: MECHANICAL VENTILATION  Goal: Oral health is maintained or improved  Outcome: Ongoing     Problem: MECHANICAL VENTILATION  Goal: ET tube will be managed safely  Outcome: Ongoing

## 2021-11-07 NOTE — PROGRESS NOTES
Patient:  Elvira Baldwin    Unit/Bed:3A-04/004-A  MRN: [id number]   PCP: CESAR Gomez CNP  Date of Admission: 10/21/2021    Assessment and Plan(All pulmonary edema, renal failure, PE, and respiratory failure diagnoses are acute in nature unless otherwise specified):        1. Acute hypoxic respiratory failure: Secondary to COVID-19 pneumonia. Patient required intubation 11/2/21 after failure of BiPAP support. Maintain PCV ventilation. Maintain lung protection strategies. Cuff leak resolved. Maintaining adequate tidal volumes. SPO2 maintained at 91%. 2. ARDS: Secondary to COVID-19 pneumonia. Undergoing lung protection strategies with VTE goal 4-6 cc/kg and maintenance of peak pressures less than 35 and plateau less than 30. PEEP is down to 8 and FiO2 is 70%. Will onsider resuming pronation therapy. Daily ABG. Daily CXR. 3. COVID-19 pneumonia: Tested positive 10/21/21. Status post Decadron and baricitinib therapy. Inflammatory markers are downtrending. 4. Undifferentiated shock: Appears septic. SIRS was met during her admission. Patient continues to have uptrending leukocytosis. Pro-Jadiel mildly elevated. Blood, urine, respiratory cultures have been no growth to date (last cultures done 11/2/21. Will repeat BAL respiratory culture and pneumonia panel today. Will repeat CXR today to evaluate for any new or worsening consolidations. Will repeat procal in AM. CVC is only 1days old and urine catheter is only 3days old. Obtain echo given hx HFpEF. Cortisol yesterday was WNL. Continue levo to maintain MAP >65.   5. Acute kidney injury: secondary to hypotension. Renal ultrasound negative for acute pathology. Non-oliguric. Cr 2.2, trending down. Neph managing with gentle fluids. No CRRT needed at this time. 6. Acute on chronic macrocytic anemia: Hemoglobin has remained stable at 8.5. Will space out H&H checks to every 12 hours. Folic acid is low, will start supplementation. No active signs of bleeding.  Keep hemoglobin >7.0.  7. Mild hypernatremia: Free water flushes increased today. D5% increased to 75 mL/h per Neph. Monitor BMP daily. 8. Diabetes mellitus type 2: Uncontrolled. Serum glucose still >300. Recent steroid use, critical illness. Continue Lantus with hold parameters. Increase sliding scale insulin to high-dose every 4 hours. Patient may require insulin drip. 9. Chronic HFpEF: EF unknown, echo attempted but no images were able to be obtained due to significant lung interference. Continue to monitor. Will order echo again today to re-attempt. Net +7L since admission, but intravascularly volume depleted based upon hypernatremia, elevated BUN:Cr ratio, concentrated urine. Continue D5% as above. Strict I/O. Start daily weights (no weight documented since admit). 10. CAD, history of: continue aspirin daily. 11. GERD, history of: Continue Protonix. 12. Hyperlipidemia, history of: Continue statin. 13. Hypertension, history of: Presently hypotensive on pressors. 14. Hypothyroidism, history of: Continue Synthroid. 15. Pulmonary Nodule: 2 cm nodule/mass lesion in the right upper lobe anteriorly noted on CTA. . Work up outpatient, recommended repeat CT in 3-6 months for reevaluation.        CC: COVID-19 pneumonia  HPI: Shiloh Casillas is an 41-year-old female former smoker with past medical history of CAD, chronic HFpEF, CKD, diabetes mellitus type 2, GERD, hyperlipidemia, hypertension, hypothyroidism, osteoarthritis, osteoporosis. Patient presented to [facility] ED on 10/21/2021 with complaints of dyspnea. She was found to be positive for COVID-19. She was hypoxic upon arrival requiring supplemental O2. She was empirically started on meropenem and azithromycin. She started IV Decadron and baricitinib for COVID-19. She is admitted to the hospitalist service. She completed 7 days of antibiotic therapy on 10/21/2021.   There has been no bacterial organisms identified on infectious work-up. On 10/24/2021, patient was requiring increased FiO2 requirements. She was escalated to high flow nasal cannula. She was later started on BiPAP. She eventually required intubation on 11/2/2021 after failure of high flow nasal cannula and BiPAP therapies. Patient was transferred to ICU for further care. She required paralytic therapy for vent synchrony. She developed hypotension requiring vasopressor support. On 11/5, she developed worsening acute kidney injury requiring nephrology consultation. For further details, please see assessment and plan. ROS: Unable to obtain as patient is intubated and mechanically ventilated. PMH:  Per HPI  SHX: Former smoker. Denies alcohol or substance use. FHX: Liver cancer, lung cancer, diabetes, stroke, hypertension. Allergies: Cephalexin, lisinopril, losartan, Fosamax.   Medications:     vasopressin (Septic Shock) infusion 0.03 Units/min (11/07/21 1322)    dextrose 75 mL/hr at 11/07/21 0315    vecuronium (NORCURON) infusion Stopped (11/06/21 1238)    norepinephrine (LEVOPHED) infusion 22 mcg/min (11/07/21 1320)    midazolam 5 mg/hr (11/07/21 0624)    propofol 30 mcg/kg/min (11/07/21 1145)    sodium chloride      dextrose        insulin lispro  0-18 Units SubCUTAneous P8A    folic acid  1 mg Oral Daily    pantoprazole  40 mg IntraVENous BID    senna  2 tablet Oral Nightly    docusate sodium  100 mg Oral BID    heparin (porcine)  5,000 Units SubCUTAneous 3 times per day    insulin glargine  25 Units SubCUTAneous Nightly    sodium chloride  500 mL IntraVENous Once    tiotropium-olodaterol  2 puff Inhalation Daily    aspirin  81 mg Per NG tube Daily    lidocaine 1 % injection  5 mL IntraDERmal Once    sodium chloride flush  5-40 mL IntraVENous 2 times per day    [Held by provider] amLODIPine  5 mg Oral Daily    levothyroxine  75 mcg Oral Daily    metoprolol succinate  50 mg Oral Daily    atorvastatin  10 mg Oral Daily       Vital Signs:   T: 98.4: P: 100 RR: 22 B/P: 101/48: FiO2: 70%: O2 Sat: 100%: I/O: 3649/1475 (+2174)   GCS: 3 (paralyzed, vent, sedated)  Body mass index is 31.2 kg/m². Elvira Burdick PC: 22/8: TV: 350: RRTotal: 20: Ti:1 sec:   General:   Acute on chronically ill adult female. Appears stated age. HEENT:  normocephalic and atraumatic. No scleral icterus. PERR  Neck: supple. No Thyromegaly. Lungs: clear to auscultation. No retractions  Cardiac: Tachycardic. No JVD. Abdomen: soft. Nontender. Extremities:  No clubbing, cyanosis, or edema x 4. Vasculature: capillary refill < 3 seconds. Palpable dorsalis pedis pulses. Skin:  warm and dry. Psych:  Unable to assess due to sedation  Lymph:  No supraclavicular adenopathy. Neurologic:  No focal deficit. No seizures. Data: (All radiographs, tracings, PFTs, and imaging are personally viewed and interpreted unless otherwise noted).  Telemetry: NSR. Rate 97. No ectopy.  BMP sodium 146 potassium 5.4 chloride 114 bicarb 23 BUN/creatinine 72/1.9 anion gap 9 GFR 25 glucose 198 calcium 8.8    triglycerides 285 CRP 1.61  ferritin 1649   Hemoglobin 8.5 hematocrit 26.9   Urine culture 11/2/21: Preliminary no growth   Respiratory culture 11/2: Normal candie   Molecular pneumonia panel 11/2: Negative   Blood culture 1 11/2: preliminary no growth   Blood culture 2 11/2: Preliminary no growth   Chest x-ray 11/4/2021 report: Improving bilateral atelectasis/infiltrate.  KUB 11/6/2021 report: Nonobstructive bowel gas pattern.  Renal ultrasound 11/5/2021 report: Probable right renal cyst but otherwise unremarkable renal ultrasound.  ABG 11/7/2021 pH 7.2 PCO2 63 PO2 59 bicarb 24      Case discussed with Dr. Danny Garcia. Signed Frederick Saul DO                Patient seen and evaluated by me, discussed with the resident. Criticla care time 35 minutes, this was discontiguous , does not include procedures.   Undifferentiated shock, cultures pending, cortisol normal. Continue work-up.     Electronically signed by Sanjuana Klinefelter, MD on 11/9/2021 at 10:16 AM

## 2021-11-07 NOTE — FLOWSHEET NOTE
11/07/21 0211   Provider Notification   Reason for Communication Evaluate   Provider Name Dr. Richard Medina   Provider Notification Physician   Method of Communication Face to face   Response See orders   Entered pt room to find tube feed had been vomited. Tube feeding turned off immediatly. In-line suctioned, for no tube feed colored contents. Orally suctioned for tube feed contents. O2 sats appropriate, and oxygenations appropriate at this time. Spoke with Dr. Harshal Morales regarding pt status. Okay to put tube feeds on hold and place OG to LIWS at this time.

## 2021-11-07 NOTE — PROGRESS NOTES
[facility]   Nephrology progress note  11/7/2021, 11:29 AM      Pt Name:    Vijay Donovan  MRN:     [id number]     [place]:    1940  Admit Date:    10/21/2021 11:02 AM  Primary Care Physician:  CESAR Avila CNP   Room number  3A-04/004-A    Chief Complaint: Nephrology following for LINDSEY    Subjective:  Patient seen and examined  Intubated and sedated  Seen earlier today during rounds  This is late entry  Discussed with ICU RN  Currently in prone position  100% FiO2  On Levophed      Objective:  24HR INTAKE/OUTPUT:      Intake/Output Summary (Last 24 hours) at 11/7/2021 1129  Last data filed at 11/7/2021 0315  Gross per 24 hour   Intake 3599 ml   Output 1475 ml   Net 2124 ml     I/O last 3 completed shifts: In: 7333 [I.V.:2401; NG/GT:1248]  Out: 2356 [Urine:1475]  No intake/output data recorded. Admission weight: 190 lb (86.2 kg)  Wt Readings from Last 3 Encounters:   10/27/21 170 lb 9.6 oz (77.4 kg)   07/19/21 187 lb (84.8 kg)   03/11/21 187 lb (84.8 kg)     Body mass index is 31.2 kg/m². Physical examination  VITALS:     Vitals:    11/07/21 0817 11/07/21 0900 11/07/21 1000 11/07/21 1100   BP:  (!) 83/47 (!) 88/43 (!) 93/42   Pulse:  98 105 97   Resp:  11 22 19   Temp:       TempSrc:       SpO2: 95% 90% 100% 100%   Weight:       Height:         Limited examination secondary to COVID-19 positivity in isolation status  Currently in prone position  General Appearance: Ill-appearing, intubated  Mouth/Throat: ET tube in place  Extremities: Bilateral 1+ edema      Lab Data  CBC:   Recent Labs     11/05/21  0450 11/05/21  1800 11/06/21  0058 11/06/21  1927 11/07/21  0911   WBC 49.4*  --  52.3*  --   --    HGB 8.7*   < > 8.6*  8.5* 8.1* 7.9*   HCT 26.0*   < > 27.2*  26.9* 25.8* 25.4*   *  --  109*  --   --     < > = values in this interval not displayed.      BMP:  Recent Labs     11/05/21  0449 11/05/21  0449 11/06/21  0353 11/06/21  1240 11/07/21  0327   *   < > 149* 149* 146*   K 4.5  --  5.1  --  5.4*   *  --  116*  --  114*   CO2 21*  --  23  --  23   BUN 99*  --  90*  --  72*   CREATININE 2.7*  --  2.2*  --  1.9*   GLUCOSE 229*  --  358*  --  198*   CALCIUM 8.6  --  8.6  --  8.8    < > = values in this interval not displayed. Hepatic:   No results for input(s): LABALBU, AST, ALT, ALB, BILITOT, ALKPHOS in the last 72 hours. Meds:  Infusion:    dextrose 75 mL/hr at 11/07/21 0315    vecuronium (NORCURON) infusion Stopped (11/06/21 1238)    norepinephrine (LEVOPHED) infusion 26 mcg/min (11/07/21 1000)    midazolam 5 mg/hr (11/07/21 0624)    propofol 30 mcg/kg/min (11/07/21 0844)    sodium chloride      dextrose       Meds:    insulin lispro  0-18 Units SubCUTAneous P1D    folic acid  1 mg Oral Daily    pantoprazole  40 mg IntraVENous BID    senna  2 tablet Oral Nightly    docusate sodium  100 mg Oral BID    heparin (porcine)  5,000 Units SubCUTAneous 3 times per day    insulin glargine  25 Units SubCUTAneous Nightly    sodium chloride  500 mL IntraVENous Once    tiotropium-olodaterol  2 puff Inhalation Daily    aspirin  81 mg Per NG tube Daily    lidocaine 1 % injection  5 mL IntraDERmal Once    sodium chloride flush  5-40 mL IntraVENous 2 times per day    [Held by provider] amLODIPine  5 mg Oral Daily    levothyroxine  75 mcg Oral Daily    metoprolol succinate  50 mg Oral Daily    atorvastatin  10 mg Oral Daily     Meds prn: sodium chloride flush, sodium chloride, LORazepam, albuterol sulfate HFA, glucose, dextrose, glucagon (rDNA), dextrose, ondansetron **OR** ondansetron, polyethylene glycol, [DISCONTINUED] acetaminophen **OR** acetaminophen       Impression and Plan:  1. LINDSEY secondary to ischemic ATN in setting of COVID-19 induced diffuse inflammatory syndrome and septic shock.   Patient is nonoliguric  Creatinine slowly improving  Continue with current management  We will give Kayexalate for hyperkalemia  Ultrasound nonsignificant  Urine electrolytes reviewed    2. Hypernatremia. We will continue with D5W, free water flushes were increased yesterday. Sodium improving  3. Azotemia: Multifactorial including catabolic state, recent steroids and LINDSEY  4. Mild metabolic acidosis. Improved  5. Acute hypoxic respiratory failure secondary to COVID-19 pneumonia  6. ARDS  7. Septic shock with marked leukocytosis  8. IDDM  9. Hyperkalemia secondary to acute respiratory acidosis as well as steroids.  Will give Kayexalate and recheck        Niya Vasques MD  [facility]

## 2021-11-08 NOTE — PROGRESS NOTES
Blood consent obtained from [employer] over the phone.  x2 verification with ADDISON Jordan.

## 2021-11-08 NOTE — PROGRESS NOTES
Renal Adjustment Follow-up    Recent Labs     11/07/21  0327 11/08/21  0216   BUN 72* 72*   CREATININE 1.9* 2.2*     Estimated Creatinine Clearance: 19 mL/min (A) (based on SCr of 2.2 mg/dL (H)).     Plan: Change merrem to 500mg q12h

## 2021-11-08 NOTE — PLAN OF CARE
Problem: Airway Clearance - Ineffective  Goal: Achieve or maintain patent airway  Outcome: Ongoing     Problem: Gas Exchange - Impaired  Goal: Absence of hypoxia  Outcome: Ongoing  Goal: Promote optimal lung function  Outcome: Ongoing     Problem: Breathing Pattern - Ineffective  Goal: Ability to achieve and maintain a regular respiratory rate  Outcome: Ongoing     Problem:  Body Temperature -  Risk of, Imbalanced  Goal: Ability to maintain a body temperature within defined limits  Outcome: Ongoing  Goal: Will regain or maintain usual level of consciousness  Outcome: Ongoing  Goal: Complications related to the disease process, condition or treatment will be avoided or minimized  Outcome: Ongoing     Problem: Isolation Precautions - Risk of Spread of Infection  Goal: Prevent transmission of infection  Outcome: Ongoing     Problem: Nutrition Deficits  Goal: Optimize nutritional status  Outcome: Ongoing     Problem: Risk for Fluid Volume Deficit  Goal: Maintain normal heart rhythm  Outcome: Ongoing  Goal: Maintain absence of muscle cramping  Outcome: Ongoing  Goal: Maintain normal serum potassium, sodium, calcium, phosphorus, and pH  Outcome: Ongoing     Problem: Loneliness or Risk for Loneliness  Goal: Demonstrate positive use of time alone when socialization is not possible  Outcome: Ongoing     Problem: Fatigue  Goal: Verbalize increase energy and improved vitality  Outcome: Ongoing     Problem: Patient Education: Go to Patient Education Activity  Goal: Patient/Family Education  Outcome: Ongoing     Problem: Pain:  Goal: Pain level will decrease  Description: Pain level will decrease  Outcome: Ongoing  Goal: Control of acute pain  Description: Control of acute pain  Outcome: Ongoing  Goal: Control of chronic pain  Description: Control of chronic pain  Outcome: Ongoing     Problem: Skin Integrity:  Goal: Will show no infection signs and symptoms  Description: Will show no infection signs and symptoms  Outcome: Ongoing  Goal: Absence of new skin breakdown  Description: Absence of new skin breakdown  Outcome: Ongoing     Problem: Falls - Risk of:  Goal: Will remain free from falls  Description: Will remain free from falls  Outcome: Ongoing  Goal: Absence of physical injury  Description: Absence of physical injury  Outcome: Ongoing     Problem: OXYGENATION/RESPIRATORY FUNCTION  Goal: Patient will maintain patent airway  11/8/2021 0919 by Martha Toro RN  Outcome: Ongoing  11/7/2021 2150 by Mary Paez RCP  Outcome: Ongoing  Goal: Patient will achieve/maintain normal respiratory rate/effort  Description: Respiratory rate and effort will be within normal limits for the patient  11/8/2021 0919 by Martha Toro RN  Outcome: Ongoing  11/7/2021 2150 by Mary Paez RCP  Outcome: Ongoing     Problem: MECHANICAL VENTILATION  Goal: Patient will maintain patent airway  11/8/2021 0919 by Martha Toro RN  Outcome: Ongoing  11/7/2021 2150 by Mary Paez RCP  Outcome: Ongoing  Goal: Oral health is maintained or improved  11/8/2021 0919 by Martha Toro RN  Outcome: Ongoing  11/7/2021 2150 by Mary Paez RCP  Outcome: Ongoing  Goal: ET tube will be managed safely  11/8/2021 0919 by Martah Toro RN  Outcome: Ongoing  11/7/2021 2150 by Mary Paez RCP  Outcome: Ongoing

## 2021-11-08 NOTE — PROGRESS NOTES
Patient:  Renny [facility]    Unit/Bed:3A-04/004-A  MRN: [id number]   PCP: CESAR Roberson CNP  Date of Admission: 10/21/2021    Assessment and Plan(All pulmonary edema, renal failure, PE, and respiratory failure diagnoses are acute in nature unless otherwise specified):        Acute hypoxic respiratory failure: Secondary to Covid-19. Patient was noted to have failed BiPAP therapy and was intubated on 11/2/2021. We will maintain lung protective strategies with a peak pressure of less than 35. Covid-19 Pneumonia: Tested positive for Covid-19 on 10/21/21. Was previously treated with empiric zith and meropenem for 7 days, D/Domingo on 10/28. Completed decadron and baricitinib therapy. Respiratory culture from 11/6 demonstrated moderate yeast and moderate gram positive cocci in pairs and clusters, will trial ampicillin, trial due to past history of allergies to cephalexin but opting for ampicillin due to suspected anaerobes with gram positive growth and negative pneumonia panel. If no reaction from trial high dose ampicillin, will begin zosyn. If does not tolerate, will consider doxycycline, clindamycin or ciprofloxacin. ARDS: P/F Ratio positive for severe ARDS. Secondary to Covid-19. Continue with lung protection strategies. Continue with pronation therapy, change ratio to 18 hours prone and 6 hours supine   Sepsis: Clinically appears septic. Significant leukocytosis, respiratory culture now demonstrated moderate gram positive cocci in clusters and pairs. Blood cultures and pneumonia panel negative. Continue gentle IVF with D5W. Trial ampicillin and if tolerated, will begin zosyn. LINDSEY: Creatinine 2.2, was 1 on admission. Suspect prerenal etiology as BUN/Cr ratio >20. Continue gentle IVF with D5W. Nephrology consulted, appreciate the recs. Hypotension: Currently requiring pressor support with levophed and vasopressin, requirements worsening. Continue to wean as tolerated.    Macrocytic Anemia: Hgb 7.3 and MCV 109.6. No clinical signs of active bleeding, no hemoptysis or hematochezia. Social history indicates no history of alcohol abuse. Continue folic acid supplementation, continue GI prophylaxis with protonix. Repeat H&H Q8H, continue to monitor. Will transfuse 2 units PRBC to increase Hgb between 9-10 and increase oxygen delivery. Transfuse to maintain Hgb >7. Thrombocytopenia: Significant decrease in platelets acutely, count 29 on AM labs. No signs of active bleeding. Continue to monitor. Fibrinogen, PT, PTT, all WNL. Fibrin split products slightly elevated. TEG ordered, transfuse pending TEG results. Repeat CBC this afternoon. Hx of HFpEF: Echo on 11/6 demonstrated grade 1 diastolic dysfunction, increased peak left ventricular outflow tract velocity, EF 65-70%. Recommend better hydration and control of the HR to help reduce the LVOT gradient. High Anion Gap Metabolic Acidosis, resolved: CO2 23 anion gap remains closed. Likely secondary to lactic acidosis with sepsis and LINDSEY. S/p bicarb drip, if acidosis worsens or anion gap opens consider resuming bicarb drip. Continue gentle IVF with D5W. Nephrology consulted. HTN: Holding home meds  HLD: Continue Lipitor  T2DM: Continue lantus and high dose sliding scale   Hypothyroidism:  Continue synthroid  Pulmonary Nodule: 2 cm nodule/mass lesion in the right upper lobe anteriorly noted on CTA. . Work up outpatient, recommended repeat CT in 3-6 months for reevaluation. CC:  SOB, Covid-19  HPI: Patient presented to [facility] on 10/21 with complaints of shortness of breath and was found to be Covid-19 positive and hypoxic. She was empirically started on meropenem and zith and was started on IV decadron and baricitinib. Antibiotic therapy was D/Domingo on 10/28 after 7 days of therapy. Her oxygen requirements increased on 10/24 from 5L NC to high flow nasal cannula and eventually BiPAP later that evening due to worsening hypoxia.  Patient was intubated on 11/2 with consent from 1119)    propofol 30 mcg/kg/min (11/08/21 1119)    sodium chloride      dextrose        docusate  100 mg Per NG tube BID    fluconazole  400 mg IntraVENous Q24H    insulin lispro  0-18 Units SubCUTAneous T0H    folic acid  1 mg Oral Daily    pantoprazole  40 mg IntraVENous BID    senna  2 tablet Oral Nightly    [Held by provider] heparin (porcine)  5,000 Units SubCUTAneous 3 times per day    insulin glargine  25 Units SubCUTAneous Nightly    sodium chloride  500 mL IntraVENous Once    tiotropium-olodaterol  2 puff Inhalation Daily    [Held by provider] aspirin  81 mg Per NG tube Daily    lidocaine 1 % injection  5 mL IntraDERmal Once    sodium chloride flush  5-40 mL IntraVENous 2 times per day    [Held by provider] amLODIPine  5 mg Oral Daily    levothyroxine  75 mcg Oral Daily    metoprolol succinate  50 mg Oral Daily    atorvastatin  10 mg Oral Daily       Vital Signs:   T: 97.9: P: 89 RR: 15 B/P: 116/48: FiO2: 75: O2 Sat:94: I/O: 3011/975  Body mass index is 31.2 kg/m². Rossy Sanders PC: 16/10: TV: 330: RRTotal: 20: Ti:1 sec:   General:   Acute on chronically ill appearing elderly female   HEENT:  normocephalic and atraumatic. No scleral icterus. PERR  Neck: supple. No Thyromegaly. Lungs: clear to auscultation. No retractions  Cardiac: RRR. No JVD. Abdomen: soft. Nontender. Extremities:  No clubbing, cyanosis, bilateral 1+ pedal edema. Vasculature: capillary refill < 3 seconds. Palpable dorsalis pedis pulses. Skin:  warm and dry. Psych: Intubated and sedated on mechanical ventilation   Lymph:  No supraclavicular adenopathy. Neurologic:  No focal deficit. No seizures. Data: (All radiographs, tracings, PFTs, and imaging are personally viewed and interpreted unless otherwise noted).    WBC 40.5, Hgb 7.3, Hct 23.8, Platelets 29  Na+ 510, K+ 5.2, Cl 104, CO2 23, BUN 72, Cr 2.2, glucose 179  CXR 11/4 demonstrated slightly improving alveolar and reticular opacities in the bilateral lungs   CXR 11/7 demonstrated diffuse bilateral interstitial opacities through the left lung as well as the right lung   Echo 11/6 demonstrated grade 1 diastolic dysfunction and increased LVOT velocity   CTA 10/25 demonstrated no PE, 2 cm nodule in the right upper lobe anteriorly, and relatively severe groundglass infiltrates throughout both lungs    Electronically signed by Rex Gavin DO on 11/8/2021 at 12:42 PM      Patient seen by me. Case discussed with resident physician. Patient continues to worsen. Need anaerobic coverage. Ciprofloxacin does not cover anaerobes. Instead of ciprofloxacin it would be Flagyl. .  Italicized font represents changes to the note made by me. CC time 35 minutes. Time was discontiguous. Time does not include procedures. Time does include my direct assessment of the patient and coordination of care.   Electronically signed by Allan Mcgill MD on 11/8/2021 at 3:42 PM

## 2021-11-08 NOTE — PROGRESS NOTES
[facility]   Nephrology progress note  11/8/2021, 10:47 AM      Pt Name:    Ailyn Calvin  MRN:     [id number]     YOB: 1940  Admit Date:    10/21/2021 11:02 AM  Primary Care Physician:  CESAR Bingham CNP   Room number  3A-04/004-A    Chief Complaint: Nephrology following for LINDSEY    Subjective:  Patient seen and examined  Intubated and sedated  This is late entry  Seen and examined earlier today during rounds  On 75% FiO2  On PEEP of 10  On Levophed  On vasopressin  On D5W  currently in prone position        Objective:  24HR INTAKE/OUTPUT:      Intake/Output Summary (Last 24 hours) at 11/8/2021 1047  Last data filed at 11/8/2021 0438  Gross per 24 hour   Intake 3011 ml   Output 975 ml   Net 2036 ml     I/O last 3 completed shifts: In: 4961 [I.V.:3011]  Out: 975 [Urine:975]  No intake/output data recorded. Admission weight: 190 lb (86.2 kg)  Wt Readings from Last 3 Encounters:   10/27/21 170 lb 9.6 oz (77.4 kg)   07/19/21 187 lb (84.8 kg)   03/11/21 187 lb (84.8 kg)     Body mass index is 31.2 kg/m². Physical examination  VITALS:     Vitals:    11/08/21 0700 11/08/21 0800 11/08/21 0900 11/08/21 0922   BP: (!) 111/48 (!) 115/51 (!) 116/48    Pulse: 90 90 89    Resp: 18 19 15    Temp:  97.9 °F (36.6 °C)     TempSrc:  Bladder     SpO2: 93% 94% 94% 94%   Weight:       Height:         Limited examination secondary to COVID-19 positivity in isolation status  Currently in prone position  General Appearance: Ill-appearing, intubated  Mouth/Throat: ET tube in place  Extremities: Bilateral 1+ edema      Lab Data  CBC:   Recent Labs     11/06/21  0058 11/06/21  1927 11/07/21  0911 11/08/21  0216 11/08/21  0800   WBC 52.3*  --   --  40.5*  --    HGB 8.6*  8.5*   < > 7.9* 7.8* 7.3*   HCT 27.2*  26.9*   < > 25.4* 22.9* 23.8*   *  --   --  29*  --     < > = values in this interval not displayed.      BMP:  Recent Labs     11/06/21  0353 11/06/21  0353 11/06/21  1240 11/07/21  0327 11/07/21  1717 11/08/21 0216   *   < > 149* 146*  --  136   K 5.1   < >  --  5.4* 5.7* 5.2   *  --   --  114*  --  104   CO2 23  --   --  23  --  23   BUN 90*  --   --  72*  --  72*   CREATININE 2.2*  --   --  1.9*  --  2.2*   GLUCOSE 358*  --   --  198*  --  221*   CALCIUM 8.6  --   --  8.8  --  8.7   MG  --   --   --   --   --  2.1   PHOS  --   --   --   --   --  5.6*    < > = values in this interval not displayed.      Hepatic:   Recent Labs     11/08/21 0216   LABALBU 1.9*   AST 40   ALT 31   BILITOT 0.3   ALKPHOS 116         Meds:  Infusion:    vasopressin (Septic Shock) infusion 0.03 Units/min (11/07/21 2150)    dextrose 75 mL/hr at 11/08/21 0751    vecuronium (NORCURON) infusion Stopped (11/06/21 1238)    norepinephrine (LEVOPHED) infusion 32 mcg/min (11/08/21 5120)    midazolam 4 mg/hr (11/07/21 2026)    propofol 30 mcg/kg/min (11/08/21 0438)    sodium chloride      dextrose       Meds:    meropenem  500 mg IntraVENous Q12H    insulin lispro  0-18 Units SubCUTAneous H4F    folic acid  1 mg Oral Daily    pantoprazole  40 mg IntraVENous BID    senna  2 tablet Oral Nightly    docusate sodium  100 mg Oral BID    [Held by provider] heparin (porcine)  5,000 Units SubCUTAneous 3 times per day    insulin glargine  25 Units SubCUTAneous Nightly    sodium chloride  500 mL IntraVENous Once    tiotropium-olodaterol  2 puff Inhalation Daily    [Held by provider] aspirin  81 mg Per NG tube Daily    lidocaine 1 % injection  5 mL IntraDERmal Once    sodium chloride flush  5-40 mL IntraVENous 2 times per day    [Held by provider] amLODIPine  5 mg Oral Daily    levothyroxine  75 mcg Oral Daily    metoprolol succinate  50 mg Oral Daily    atorvastatin  10 mg Oral Daily     Meds prn: sodium chloride flush, sodium chloride, LORazepam, albuterol sulfate HFA, glucose, dextrose, glucagon (rDNA), dextrose, ondansetron **OR** ondansetron, polyethylene glycol, [DISCONTINUED] acetaminophen **OR** acetaminophen       Impression and Plan:  1. LINDSEY secondary to ischemic ATN in setting of COVID-19 induced diffuse inflammatory syndrome and septic shock. LINDSEY further exacerbated by acute vasomotor constriction from acute hypercarbia  Creatinine was improving but has worsened in last 24 hours  Urine output noted  Still no acute need for renal replacement therapy  We will continue to monitor closely    2. Acute hypoxic hypercarbic respiratory failure with severe respiratory acidemia. Vent management per pulmonary. ABG 7.15/70/68  3. Hyperkalemia due to acute respiratory acidemia. Medically managed but still has low-grade potassium imbalance. Will monitor closely. Recheck labs this afternoon. 4.  Hypernatremia. Stop D5W. Sodium improved  5. Azotemia: Multifactorial including catabolic state, recent steroids and LINDSEY  6. Acute hypoxic respiratory failure secondary to COVID-19 pneumonia  7. ARDS  8. Septic shock with marked leukocytosis  9.   IDDM  Discussed with ICU RN      Yudith Santizo MD  [facility]

## 2021-11-08 NOTE — PROGRESS NOTES
nonobstructive bowel pattern. Labs reviewed: BUN: 72, Creatinine: 2.2, POC: 223, MAP: 67. Meds reviewed: colace, folic acid, lantus, humalog, senna. Wounds:  Pressure Injury, Stage II (sacrum)       Current Nutrition Therapies:    ADULT TUBE FEEDING; Orogastric; Peptide Based; Continuous; 20; Yes; 10; Q 6 hours; 40; 300; Q 6 hours  Current Tube Feeding (TF) Orders:  · Feeding Route: Orogastric  · Formula: Peptide Based (Vital 1.2)  · Schedule: Continuous (TF hold started 11/7, when able TF goal is 40ml/hr with current diprivan rate)  · Water Flushes: as per Doctor  · Goal TF & Flush Orders Provides: Vital 1.2 at 40ml/hr = 1152 kcals (3059 with diprivan lipids), 72gms protein, 106gms cho, 5gms fiber,1579mg K+, 964mg phosphorus,  779ml free H20 in 960ml total volume/24h. Additional Calorie Sources:   diprivan at 13.9ml/hr provides 367 lipid kcals per 24 hours    Anthropometric Measures:  · Height: 5' 2\" (157.5 cm)  · Current Body Weight: 170 lb 9.6 oz (77.4 kg) (10/27/21 no edema)   · Admission Body Weight: 190 lb (86.2 kg) (estimated on 10/21/21)    · Usual Body Weight:  (Pt unable to state. Per EMR: 12/10/20: 188#12.8oz, 7/19/21: 187#)     · Ideal Body Weight: 110 lbs;   · BMI: 31.2  · Adjusted Body Weight:  ; No Adjustment     · BMI Categories: Obese Class 1 (BMI 30.0-34. 9)       Nutrition Diagnosis:   · Inadequate oral intake related to impaired respiratory function as evidenced by NPO or clear liquid status due to medical condition, intubation, nutrition support - enteral nutrition    Nutrition Interventions:   Food and/or Nutrient Delivery:  Continue NPO (restart EN when able; vital 1.2 at 10ml/hr)  Nutrition Education/Counseling:  No recommendation at this time   Coordination of Nutrition Care:  Continue to monitor while inpatient    Goals:  Patient will receive EN appropriate for COVID recovery process       Nutrition Monitoring and Evaluation:   Behavioral-Environmental Outcomes:  None Identified

## 2021-11-09 NOTE — PROGRESS NOTES
Patient:  Chuy Quiroz    Unit/Bed:3A-04/004-A  MRN: [id number]   PCP: CESAR Morgan CNP  Date of Admission: 10/21/2021    Assessment and Plan(All pulmonary edema, renal failure, PE, and respiratory failure diagnoses are acute in nature unless otherwise specified):        1. Acute hypoxic respiratory failure: Secondary to Covid-19. Patient was noted to have failed BiPAP therapy and was intubated on 11/2/2021. We will maintain lung protective strategies with a peak pressure of less than 35.   2. Covid-19 Pneumonia: Tested positive for Covid-19 on 10/21/21. Was previously treated with empiric zith and meropenem for 7 days, D/Domingo on 10/28. Completed decadron and baricitinib therapy. Respiratory culture from 11/6 demonstrated moderate yeast and moderate gram positive cocci in pairs and clusters. Trial of ampicillin given yesterday, patient tolerated with no reaction. Will begin zosyn for broad coverage of anaerobes, if unable to tolerate zosyn can consider doxycycline, clindamycin or flagyl for anaerobe coverage. Zosyn lower strength due to renally dosing, GFR 24.  3. ARDS: P/F Ratio positive for severe ARDS. Secondary to Covid-19. Continue with lung protection strategies. Continue with pronation therapy, continue with ratio of 18 hours prone and 6 hours supine. 4. Sepsis: Clinically appears septic. Respiratory culture now demonstrating moderate gram positive cocci in clusters and pairs. Blood cultures and pneumonia panel negative. Ampicillin trial yesterday, patient tolerated. Will begin zosyn for broad coverage of anaerobes, if unable to tolerate zosyn can consider clindamycin, doxycycline, or flagyl for anaerobe coverage. Day 1 of zosyn therapy. Zosyn lower strength due to renally dosing, GFR 24.   5. LINDSEY: Creatinine 2.0, was 1 on admission. Suspect prerenal etiology as BUN/Cr ratio >20. Nephrology consulted, appreciate the recs.      6. Hypotension: Currently requiring pressor support with levophed and vasopressin, requirements worsening. Continue to wean as tolerated. 7. Macrocytic Anemia: Hgb 10.4 and MCV 96.7. S/P transfusion 2 units PRBCs 11/8. No clinical signs of active bleeding, no hemoptysis or hematochezia. Continue folic acid supplementation, continue GI prophylaxis with protonix. Transfuse to maintain Hgb >7.  8. Thrombocytopenia: Platelets continue to stay low, 36 on AM labs. Likely secondary to sepsis and Covid-19 infection. No signs of active bleeding. Continue to monitor. Repeat platelet count this afternoon. 9. Candidiasis: Respiratory culture demonstrated moderate budding yeast and pseudohyphae, positive growth for candida albicans. Will continue with diflucan, day 2 of therapy. 10. Hx of HFpEF: Echo on 11/6 demonstrated grade 1 diastolic dysfunction, increased peak left ventricular outflow tract velocity, EF 65-70%. Recommend better hydration and control of the HR to help reduce the LVOT gradient. 11. High Anion Gap Metabolic Acidosis, resolved: CO2 24 anion gap remains closed. Likely secondary to lactic acidosis with sepsis and LINDSEY. S/p bicarb drip, if acidosis worsens or anion gap opens consider resuming bicarb drip. Nephrology consulted. 12. HTN: Holding home meds  13. HLD: Continue Lipitor  14. T2DM: Continue lantus and high dose sliding scale   15. Hypothyroidism:  Continue synthroid  16. Pulmonary Nodule: 2 cm nodule/mass lesion in the right upper lobe anteriorly noted on CTA. . Work up outpatient, recommended repeat CT in 3-6 months for reevaluation. CC:  SOB, Covid-19  HPI: Patient presented to [facility] on 10/21 with complaints of shortness of breath and was found to be Covid-19 positive and hypoxic. She was empirically started on meropenem and zith and was started on IV decadron and baricitinib. Antibiotic therapy was D/Domigno on 10/28 after 7 days of therapy.  Her oxygen requirements increased on 10/24 from 5L NC to high flow nasal cannula and eventually BiPAP later that evening due to worsening hypoxia. Patient was intubated on 11/2 with consent from [address] [place] [address]. She was transferred to the ICU for further care. 11/4: LINDSEY continued to worsen, will consult nephrology due to LINDSEY and hypernatremia and hyperkalemia. TOF 0/4 with minimal dose of vecuronium, will D/C paralytic. Continues to have significant leukocytosis, but all cultures up to this point have remained negative. Continue with ventilation. 11/5: No improvement of LINDSEY, nephrology consulted. Hyperkalemia has resolved and hypernatremia has improved. Hgb continuing to drop, 8.7 this AM and noted to be macrocytic. Will order thyroid studies, B12, folate, blood smear, begin stress ulcer prophylaxis with protonix 40mg BID. Continuing to have significant leukocytosis, will repeat inflammatory markers for possible macrophage activation syndrome. Continue gentle IVF. 11/8: LINDSEY continuing to improve, nephrology switched fluids from NS to D5W. Hgb continuing to drop, 7.3 on most recent labs with macrocytosis. Continue with folic acid, will transfuse 2 units PRBC today. Respiratory culture now demonstrating gram positive cocci in clusters and pairs, will trial ampicillin high dose and if tolerated, will begin zosyn. Opting for ampicillin due to suspected anaerobes due to gram positive cocci growth on respiratory culture and negative pneumonia panel, optimal coverage with ampicillin. Patient continuing to deteriorate with worsening oxygen requirements, poor prognosis. 11/9: Patient tolerated the ampicillin trial yesterday, will begin zosyn for anaerobe coverage. Leukocytosis improving, 40.5-->27.2. S/P 2 units PRBC transfusion yesterday and platelet transfusion, Hgb currently stable and platelets 36, will recheck platelet count in the afternoon. Patient continuing to require high ventilator settings.      ROS: Unable to obtain due to intubation and sedation  PMH:  Per HPI  SHX:  She is a former smoker with a 0.25 pack year history, She pedis pulses. Skin:  warm and dry. Psych: Intubated and sedated on mechanical ventilation   Lymph:  No supraclavicular adenopathy. Neurologic:  No focal deficit. No seizures. Data: (All radiographs, tracings, PFTs, and imaging are personally viewed and interpreted unless otherwise noted).  WBC 27.2, Hgb 10.4, Hct 32, Platelets 36   Na+ 814, K+ 5, Cl 105, CO2 24, BUN 68, Cr 2.0, glucose 93   Telemetry shows NSR  · CXR 11/4 demonstrated slightly improving alveolar and reticular opacities in the bilateral lungs   · CXR 11/7 demonstrated diffuse bilateral interstitial opacities through the left lung as well as the right lung   · Echo 11/6 demonstrated grade 1 diastolic dysfunction and increased LVOT velocity   · CTA 10/25 demonstrated no PE, 2 cm nodule in the right upper lobe anteriorly, and relatively severe groundglass infiltrates throughout both lungs    Electronically signed by Preet Blue DO on 11/9/2021 at 9:12 AM     Patient seen by me. Case discussed with resident physician. Patient with thrombocytopenia. TTP and DIC excluded. Awaiting HIT scan. No evidence of active bleed. Creatinine improved. To improve oncotic pressure and to assist with renal perfusion. Italicized font represents changes to the note made by me. CC time 35 minutes. Time was discontiguous. Time does not include procedures. Time does include my direct assessment of the patient and coordination of care.   Electronically signed by Keturah Moritz, MD on 11/9/2021 at 4:16 PM

## 2021-11-09 NOTE — FLOWSHEET NOTE
11/08/21 2200   Mobility   Activity In bed   Level of Assistance Dependent, patient does less than 25%   Repositioned Prone   Patient Turned Prone   Head of Bed Elevated  Reverse Trendelenburg   Heels/Feet Heel(s) elevated off bed   Range of Motion All extremities; Passive   Anti-Embolism Intervention Medication   patient proned with RT and 3 other RN's in room.  All line and tube integrity maintained

## 2021-11-09 NOTE — PROGRESS NOTES
[facility]   Nephrology progress note  11/9/2021, 11:34 AM      Pt Name:    Tanya Rodas  MRN:     [id number]     [place]:    1940  Admit Date:    10/21/2021 11:02 AM  Primary Care Physician:  CESAR Mendez CNP   Room number  3A-04/004-A    Chief Complaint: Nephrology following for LINDSEY    Subjective:  Patient seen and examined  This is late entry  Seen and examined earlier today during rounds  Patient is currently in prone position  On Levophed  On vasopressin  On 80% FiO2        Objective:  24HR INTAKE/OUTPUT:      Intake/Output Summary (Last 24 hours) at 11/9/2021 1134  Last data filed at 11/9/2021 0600  Gross per 24 hour   Intake 2637.93 ml   Output 1520 ml   Net 1117.93 ml     I/O last 3 completed shifts: In: 4450 [I.V.:2122.2; Blood:1070.8; IV Piggyback:301]  Out: 1520 [Urine:1220; Emesis/NG output:300]  No intake/output data recorded. Admission weight: 190 lb (86.2 kg)  Wt Readings from Last 3 Encounters:   10/27/21 170 lb 9.6 oz (77.4 kg)   07/19/21 187 lb (84.8 kg)   03/11/21 187 lb (84.8 kg)     Body mass index is 31.2 kg/m². Physical examination  VITALS:     Vitals:    11/09/21 0800 11/09/21 0900 11/09/21 1000 11/09/21 1040   BP: (!) 121/48 (!) 112/45 (!) 103/46    Pulse: 84 85 84 89   Resp: 22 22 23 23   Temp: 98.2 °F (36.8 °C)      TempSrc: Bladder      SpO2: 98% 97% 97% 96%   Weight:       Height:         Limited examination secondary to COVID-19 positivity in isolation status  Currently in prone position  General Appearance: Ill-appearing, intubated  Mouth/Throat: ET tube in place  Extremities: Bilateral 2+ edema      Lab Data  CBC:   Recent Labs     11/08/21  0216 11/08/21  0800 11/08/21  1300 11/08/21  1952 11/09/21  0311 11/09/21  0800   WBC 40.5*  --   --   --  27.2*  --    HGB 7.8*   < >  --  10.9* 10.4* 10.4*   HCT 22.9*   < >  --  33.8* 32.0* 32.2*   PLT 29*  --  25* 16* 36*  --     < > = values in this interval not displayed.      BMP:  Recent Labs 11/07/21  0327 11/07/21  1717 11/08/21  0216 11/08/21  1300 11/09/21  0311   *  --  136  --  140   K 5.4*   < > 5.2 5.1 5.0   *  --  104  --  105   CO2 23  --  23  --  24   BUN 72*  --  72*  --  68*   CREATININE 1.9*  --  2.2*  --  2.0*   GLUCOSE 198*  --  221*  --  93   CALCIUM 8.8  --  8.7  --  8.8   MG  --   --  2.1  --   --    PHOS  --   --  5.6*  --   --     < > = values in this interval not displayed.      Hepatic:   Recent Labs     11/08/21 0216   LABALBU 1.9*   AST 40   ALT 31   BILITOT 0.3   ALKPHOS 116         Meds:  Infusion:    sodium chloride      sodium chloride      vasopressin (Septic Shock) infusion 0.03 Units/min (11/09/21 0753)    vecuronium (NORCURON) infusion Stopped (11/06/21 1238)    norepinephrine (LEVOPHED) infusion 18 mcg/min (11/09/21 0748)    midazolam 5 mg/hr (11/09/21 0300)    propofol 30 mcg/kg/min (11/09/21 0746)    sodium chloride      dextrose       Meds:    piperacillin-tazobactam  3,375 mg IntraVENous Q12H    docusate  100 mg Per NG tube BID    fluconazole  400 mg IntraVENous Q24H    insulin lispro  0-18 Units SubCUTAneous T1H    folic acid  1 mg Oral Daily    pantoprazole  40 mg IntraVENous BID    senna  2 tablet Oral Nightly    [Held by provider] heparin (porcine)  5,000 Units SubCUTAneous 3 times per day    insulin glargine  25 Units SubCUTAneous Nightly    sodium chloride  500 mL IntraVENous Once    tiotropium-olodaterol  2 puff Inhalation Daily    [Held by provider] aspirin  81 mg Per NG tube Daily    lidocaine 1 % injection  5 mL IntraDERmal Once    sodium chloride flush  5-40 mL IntraVENous 2 times per day    [Held by provider] amLODIPine  5 mg Oral Daily    levothyroxine  75 mcg Oral Daily    metoprolol succinate  50 mg Oral Daily    atorvastatin  10 mg Oral Daily     Meds prn: sodium chloride, sodium chloride, sodium chloride flush, sodium chloride, LORazepam, albuterol sulfate HFA, glucose, dextrose, glucagon (rDNA), dextrose, ondansetron **OR** ondansetron, polyethylene glycol, [DISCONTINUED] acetaminophen **OR** acetaminophen       Impression and Plan:  1. LINDSEY secondary to ischemic ATN in setting of COVID-19 induced diffuse inflammatory syndrome and septic shock. LINDSEY further exacerbated by acute vasomotor constriction from acute hypercarbia  Serum creatinine is slightly better in last 24 hours  Urine output 1.2 L  Some improvement in acute hypercarbia. As a result urine output has also improved  Continue with current management  No acute need for renal replacement therapy at this time  Overall patient is in positive fluid balance. Would like to give diuretics but patient is requiring 2 pressors at this time    2. Acute hypoxic hypercarbic respiratory failure with severe respiratory acidemia. Vent management per pulmonary. ABG slightly improved. pH 7.2 9/51/66  3. Hyperkalemia due to acute respiratory acidemia. Improving with improvement in respiratory acidosis  4. Hypernatremia. Resolved. Will monitor  5. Azotemia: Multifactorial including catabolic state, recent steroids and LINDSEY  6. Acute hypoxic respiratory failure secondary to COVID-19 pneumonia  7. ARDS  8. Septic shock with marked leukocytosis  9.   IDDM    Reyes Chacon MD  [facility]

## 2021-11-09 NOTE — PLAN OF CARE
Problem: Airway Clearance - Ineffective  Goal: Achieve or maintain patent airway  Outcome: Ongoing     Problem: Gas Exchange - Impaired  Goal: Absence of hypoxia  Outcome: Ongoing  Goal: Promote optimal lung function  Outcome: Ongoing     Problem: Breathing Pattern - Ineffective  Goal: Ability to achieve and maintain a regular respiratory rate  Outcome: Ongoing     Problem:  Body Temperature -  Risk of, Imbalanced  Goal: Ability to maintain a body temperature within defined limits  Outcome: Ongoing  Goal: Will regain or maintain usual level of consciousness  Outcome: Ongoing  Goal: Complications related to the disease process, condition or treatment will be avoided or minimized  Outcome: Ongoing     Problem: Isolation Precautions - Risk of Spread of Infection  Goal: Prevent transmission of infection  Outcome: Ongoing     Problem: Nutrition Deficits  Goal: Optimize nutritional status  Outcome: Ongoing     Problem: Risk for Fluid Volume Deficit  Goal: Maintain normal heart rhythm  Outcome: Ongoing  Goal: Maintain absence of muscle cramping  Outcome: Ongoing  Goal: Maintain normal serum potassium, sodium, calcium, phosphorus, and pH  Outcome: Ongoing     Problem: Loneliness or Risk for Loneliness  Goal: Demonstrate positive use of time alone when socialization is not possible  Outcome: Ongoing     Problem: Fatigue  Goal: Verbalize increase energy and improved vitality  Outcome: Ongoing     Problem: Patient Education: Go to Patient Education Activity  Goal: Patient/Family Education  Outcome: Ongoing     Problem: Pain:  Goal: Pain level will decrease  Description: Pain level will decrease  Outcome: Ongoing  Goal: Control of acute pain  Description: Control of acute pain  Outcome: Ongoing  Goal: Control of chronic pain  Description: Control of chronic pain  Outcome: Ongoing     Problem: Skin Integrity:  Goal: Will show no infection signs and symptoms  Description: Will show no infection signs and symptoms  Outcome: Ongoing  Goal: Absence of new skin breakdown  Description: Absence of new skin breakdown  Outcome: Ongoing     Problem: Falls - Risk of:  Goal: Will remain free from falls  Description: Will remain free from falls  Outcome: Ongoing  Goal: Absence of physical injury  Description: Absence of physical injury  Outcome: Ongoing     Problem: OXYGENATION/RESPIRATORY FUNCTION  Goal: Patient will maintain patent airway  Outcome: Ongoing  Goal: Patient will achieve/maintain normal respiratory rate/effort  Description: Respiratory rate and effort will be within normal limits for the patient  Outcome: Ongoing     Problem: MECHANICAL VENTILATION  Goal: Patient will maintain patent airway  Outcome: Ongoing  Goal: Oral health is maintained or improved  Outcome: Ongoing  Goal: ET tube will be managed safely  Outcome: Ongoing

## 2021-11-09 NOTE — PROGRESS NOTES
Pharmacy Renal Adjustment    Belle Zeng is a 79 y.o. female. Pharmacy renally adjust the following medications per P&T approved policy: Zosyn    Recent Labs     11/08/21  0216 11/09/21  0311   BUN 72* 68*   CREATININE 2.2* 2.0*     Estimated Creatinine Clearance: 21 mL/min (A) (based on SCr of 2 mg/dL (H)). Calculated CrCl:    Height:   Ht Readings from Last 1 Encounters:   10/21/21 5' 2\" (1.575 m)     Weight:  Wt Readings from Last 1 Encounters:   10/27/21 170 lb 9.6 oz (77.4 kg)     Baseline SCr: 0.9    Plan: Adjustments based on renal function:           Change  Zosyn to 3.375g IV q12h extended infusion due to renal function and no current need for renal replacement. Will continue to monitor.      Alma Wong PharmD  9:12 AM  11/9/2021

## 2021-11-10 NOTE — PROGRESS NOTES
Comprehensive Nutrition Assessment    Type and Reason for Visit:  Reassess    Nutrition Recommendations/Plan:   Increase TF (Vital 1.2) to 40 ml/hr (goal). Free water flush per MD (current order 300 ml every 6 hours). Monitor renal function vs. Need to adjust TF (formula, rate, etc). Nutrition Assessment:    Pt. Improving from a nutritional standpoint AEB tolerating TF; RN to increase to goal rate. Remains at risk for further nutritional compromise r/t severe malnutrition, +covid 10/21; had been on bipap/high flow prior to intubation 11/2 with inability to consume much po first 11d of admit, advanced age, overweight, LOS day 20, and underlying medical condition (hx: pulmonary mass not new/stable, former smoker, CAD, CHF, CKD, DM - A1c 6.3%, GERD, Hypercalcemia, Hyperlipidemia, HTN, Low vitamin D, Macrocytosis, Takotsubo cadiomyopathy).     Malnutrition Assessment:  Malnutrition Status:  Severe malnutrition    Context:  Acute Illness     Findings of the 6 clinical characteristics of malnutrition:  Energy Intake:  7 - 50% or less of estimated energy requirements for 5 or more days (not able to tolerate being of bipap to consume meals)  Weight Loss:   (note 8.9% lossin the last 3 months)     Body Fat Loss:  No significant body fat loss     Muscle Mass Loss:  No significant muscle mass loss    Fluid Accumulation:  No significant fluid accumulation     Strength:  Not Performed    Estimated Daily Nutrient Needs:  Energy (kcal):  4035-2143 (30-32 kcals/kg) - late phase; Weight Used for Energy Requirements:  Ideal (50kg - ideal)     Protein (g):  ~100 (~2.0 grams/kg); Weight Used for Protein Requirements:  Ideal (50kg - ideal)        Fluid (ml/day):  ~ 1925 kcals (25 kcals/kg/day) hx CHF; Method Used for Fluid Requirements:   (77 kg)      Nutrition Related Findings:   Remains intubated; Diprovan infusing; TF at 30 ml/hr; RN reports pt. Tolerating; states will increase TF to goal of 40 ml/hr; reports pt.  Having small bowel movements; Nephrology following - LINDSEY - no need for RRT at this time; 11/10: Glucose 162, BUN 72, Cr 1.6, Potassium 4.0; Rx includes Levophed, Vasopressin, Vecuranium, ATB, Colace, Folvite, Insulin, Glycolax, Senokot, Folvite     Wounds:  Pressure Injury, Stage II (sacrum)       Current Nutrition Therapies:    ADULT TUBE FEEDING; Orogastric; Peptide Based; Continuous; 20; Yes; 10; Q 6 hours; 40; 300; Q 6 hours  Current Tube Feeding (TF) Orders:  · Feeding Route: Orogastric  · Formula: Peptide Based (Vital 1.2)  · Schedule: Continuous (30 ml/hr; goal 40 ml/hr)  · Additives/Modulars:  (none)  · Water Flushes: 300 ml every 6 hours (per MD)  · Goal TF & Flush Orders Provides: Vital 1.2 at 40ml/hr = 1152 kcals (1397 with diprivan kcals), 72gms protein, 106 gms cho, 5 gms fiber,1579 mg K+, 964 mg phosphorus,  779ml free H20 (1979 ml with flushes) in 960 ml total volume (2160 ml with flushes)/24h. Additional Calorie Sources:   Diprovan at 9.3 ml/hr providing pt. with 245 kcals from IV lipid emulsion/24 hours    Anthropometric Measures:  · Height: 5' 2\" (157.5 cm)  · Current Body Weight: 170 lb 9.6 oz (77.4 kg) (10/27/21 no edema; 11/10 - no new weights but currently +3, +4 edema)   · Admission Body Weight: 190 lb (86.2 kg) (estimated on 10/21/21)    · Usual Body Weight:  (Pt unable to state. Per EMR: 12/10/20: 188#12.8oz, 7/19/21: 187#)   · Ideal Body Weight: 110 lbs; % Ideal Body Weight     · BMI: 31.2  · BMI Categories: Obese Class 1 (BMI 30.0-34. 9)       Nutrition Diagnosis:   · Inadequate oral intake related to impaired respiratory function as evidenced by NPO or clear liquid status due to medical condition, intubation, nutrition support - enteral nutrition      Nutrition Interventions:   Food and/or Nutrient Delivery:  Continue Current Tube Feeding  Nutrition Education/Counseling:  No recommendation at this time   Coordination of Nutrition Care:  Continue to monitor while inpatient    Goals:  Patient will receive EN appropriate for COVID recovery process       Nutrition Monitoring and Evaluation:   Behavioral-Environmental Outcomes:  None Identified   Food/Nutrient Intake Outcomes:  Enteral Nutrition Intake/Tolerance  Physical Signs/Symptoms Outcomes:  Biochemical Data, Chewing or Swallowing, GI Status, Fluid Status or Edema, Hemodynamic Status, Meal Time Behavior, Skin, Weight, Nutrition Focused Physical Findings     Discharge Planning:     Too soon to determine     Electronically signed by Lew Jefferson RD, HARDY on 11/10/21 at 2:56 PM EST    Contact: [phone]

## 2021-11-10 NOTE — PROGRESS NOTES
[facility]   Nephrology progress note  11/10/2021, 8:41 AM      Pt Name:    Amy Vu  MRN:     [id number]     [place]ygfurt:    1940  Admit Date:    10/21/2021 11:02 AM  Primary Care Physician:  CESAR Osorio CNP   Room number  3A-04/004-A    Chief Complaint: Nephrology following for LINDSEY    Subjective:  Patient seen and examined  Seen and examined earlier today  Currently on 70% FiO2  PEEP of 12  On Levophed  On vasopressin  Supine        Objective:  24HR INTAKE/OUTPUT:      Intake/Output Summary (Last 24 hours) at 11/10/2021 0841  Last data filed at 11/10/2021 0618  Gross per 24 hour   Intake 2966.55 ml   Output 1497 ml   Net 1469.55 ml     I/O last 3 completed shifts: In: 2966.6 [I.V.:1130.3; Blood:477; NG/GT:574; IV Piggyback:785.3]  Out: 1543 [Urine:1497]  No intake/output data recorded. Admission weight: 190 lb (86.2 kg)  Wt Readings from Last 3 Encounters:   10/27/21 170 lb 9.6 oz (77.4 kg)   07/19/21 187 lb (84.8 kg)   03/11/21 187 lb (84.8 kg)     Body mass index is 31.2 kg/m². Physical examination  VITALS:     Vitals:    11/10/21 0540 11/10/21 0550 11/10/21 0600 11/10/21 0815   BP: (!) 127/47 (!) 124/47 (!) 120/48    Pulse: 88 84 91    Resp: 21 23 23    Temp:       TempSrc:       SpO2: 92% 92% 92% 92%   Weight:       Height:         Limited examination secondary to COVID-19 positivity in isolation status  Currently in prone position  General Appearance: Ill-appearing, intubated  Mouth/Throat: ET tube in place  Extremities: Bilateral 2+ edema, stable      Lab Data  CBC:   Recent Labs     11/08/21  0216 11/08/21  0800 11/09/21  0311 11/09/21  0311 11/09/21  0800 11/09/21  1245 11/10/21  0000 11/10/21  0451   WBC 40.5*  --  27.2*  --   --   --   --  18.4*   HGB 7.8*   < > 10.4*  --  10.4*  --   --  7.7*   HCT 22.9*   < > 32.0*  --  32.2*  --   --  23.7*   PLT 29*   < > 36*   < >  --  28* 20* 64*    < > = values in this interval not displayed.      BMP:  Recent Labs 11/08/21 0216 11/08/21 0216 11/08/21  1300 11/09/21  0311 11/10/21  0451     --   --  140 141   K 5.2   < > 5.1 5.0 4.0     --   --  105 104   CO2 23  --   --  24 23   BUN 72*  --   --  68* 72*   CREATININE 2.2*  --   --  2.0* 1.6*   GLUCOSE 221*  --   --  93 162*   CALCIUM 8.7  --   --  8.8 8.6   MG 2.1  --   --   --   --    PHOS 5.6*  --   --   --   --     < > = values in this interval not displayed.      Hepatic:   Recent Labs     11/08/21  0216 11/10/21  0451   LABALBU 1.9* 2.6*   AST 40 37   ALT 31 22   BILITOT 0.3 0.9   ALKPHOS 116 93         Meds:  Infusion:    sodium chloride      sodium chloride      vasopressin (Septic Shock) infusion 0.03 Units/min (11/10/21 0618)    vecuronium (NORCURON) infusion Stopped (11/06/21 1238)    norepinephrine (LEVOPHED) infusion 7 mcg/min (11/10/21 0618)    midazolam 6 mg/hr (11/10/21 0618)    propofol 20 mcg/kg/min (11/10/21 0618)    sodium chloride Stopped (11/10/21 0353)    dextrose       Meds:    piperacillin-tazobactam  3,375 mg IntraVENous Q12H    albumin human  25 g IntraVENous Q6H    docusate  100 mg Per NG tube BID    fluconazole  400 mg IntraVENous Q24H    insulin lispro  0-18 Units SubCUTAneous H8W    folic acid  1 mg Oral Daily    pantoprazole  40 mg IntraVENous BID    senna  2 tablet Oral Nightly    [Held by provider] heparin (porcine)  5,000 Units SubCUTAneous 3 times per day    insulin glargine  25 Units SubCUTAneous Nightly    sodium chloride  500 mL IntraVENous Once    tiotropium-olodaterol  2 puff Inhalation Daily    [Held by provider] aspirin  81 mg Per NG tube Daily    lidocaine 1 % injection  5 mL IntraDERmal Once    sodium chloride flush  5-40 mL IntraVENous 2 times per day    [Held by provider] amLODIPine  5 mg Oral Daily    levothyroxine  75 mcg Oral Daily    metoprolol succinate  50 mg Oral Daily    atorvastatin  10 mg Oral Daily     Meds prn: sodium chloride, sodium chloride, sodium chloride flush, sodium chloride, LORazepam, albuterol sulfate HFA, glucose, dextrose, glucagon (rDNA), dextrose, ondansetron **OR** ondansetron, polyethylene glycol, [DISCONTINUED] acetaminophen **OR** acetaminophen       Impression and Plan:  1. LINDSEY secondary to ischemic ATN in setting of COVID-19 induced diffuse inflammatory syndrome and septic shock. LINDSEY further exacerbated by acute vasomotor constriction from acute hypercarbia  Urine output has improved with reversal of acute hypercarbia  Still requiring 2 pressors   creatinine improved to 1.6 today  No need for renal replacement therapy at this time  Continue to monitor closely  Overall still positive fluid balance but unable to give diuretics due to hypotension requiring 2 pressors    2. Acute hypoxic hypercarbic respiratory failure with severe respiratory acidemia. Vent management per pulmonary. 3.  Hyperkalemia due to acute respiratory acidemia. Improved with improvement in respiratory acidosis  4. Hypernatremia. Resolved. Will monitor  5. Azotemia: Multifactorial including catabolic state, recent steroids and LINDSEY  6. Acute hypoxic respiratory failure secondary to COVID-19 pneumonia  7. ARDS  8. Septic shock with marked leukocytosis: WBC improving  9.   IDDM    Timbo Brush MD  [facility]

## 2021-11-10 NOTE — PROGRESS NOTES
tolerated. Macrocytic Anemia: Hgb 7.7 and MCV 96.7. Not currently macrocytic, but s/p transfusion with anisocytosis present, suspect MCV appears normal secondary to transfusion. S/P transfusion 2 units PRBCs 11/8. No clinical signs of active bleeding, no hemoptysis or hematochezia. Continue folic acid supplementation, continue GI prophylaxis with protonix. Transfuse to maintain Hgb >7 with goal between 9 and 10 to maximize oxygen delivery. Thrombocytopenia: Platelets 64 on AM labs, s/p transfusion 11/9. DIC and ITP ruled out through lab work, HIT panel pending. Likely secondary to sepsis and active Covid-19 infection. No signs of active bleeding. Continue to monitor, daily CBC. Candidiasis: Respiratory culture demonstrated moderate budding yeast and pseudohyphae, positive growth for candida albicans. Will continue with diflucan, day 3 of therapy. Hx of HFpEF: Echo on 11/6 demonstrated grade 1 diastolic dysfunction, increased peak left ventricular outflow tract velocity, EF 65-70%. Recommend better hydration and control of the HR to help reduce the LVOT gradient. High Anion Gap Metabolic Acidosis, resolved: Resolved, continue to monitor. Consider bicarb drip if anion gap opens. HTN: Holding home meds  HLD: Continue Lipitor  T2DM: Continue lantus and high dose sliding scale   Hypothyroidism:  Continue synthroid  Pulmonary Nodule: 2 cm nodule/mass lesion in the right upper lobe anteriorly noted on CTA. . Work up outpatient, recommended repeat CT in 3-6 months for reevaluation. CC:  SOB, Covid-19  HPI: Patient presented to [facility] on 10/21 with complaints of shortness of breath and was found to be Covid-19 positive and hypoxic. She was empirically started on meropenem and zith and was started on IV decadron and baricitinib. Antibiotic therapy was D/Domingo on 10/28 after 7 days of therapy.  Her oxygen requirements increased on 10/24 from 5L NC to high flow nasal cannula and eventually BiPAP later that evening due to worsening hypoxia. Patient was intubated on 11/2 with consent from [address] [place] [address]. She was transferred to the ICU for further care. 11/4: LINDSEY continued to worsen, will consult nephrology due to LINDSEY and hypernatremia and hyperkalemia. TOF 0/4 with minimal dose of vecuronium, will D/C paralytic. Continues to have significant leukocytosis, but all cultures up to this point have remained negative. Continue with ventilation. 11/5: No improvement of LINDSEY, nephrology consulted. Hyperkalemia has resolved and hypernatremia has improved. Hgb continuing to drop, 8.7 this AM and noted to be macrocytic. Will order thyroid studies, B12, folate, blood smear, begin stress ulcer prophylaxis with protonix 40mg BID. Continuing to have significant leukocytosis, will repeat inflammatory markers for possible macrophage activation syndrome. Continue gentle IVF. 11/8: LINDSEY continuing to improve, nephrology switched fluids from NS to D5W. Hgb continuing to drop, 7.3 on most recent labs with macrocytosis. Continue with folic acid, will transfuse 2 units PRBC today. Respiratory culture now demonstrating gram positive cocci in clusters and pairs, will trial ampicillin high dose and if tolerated, will begin zosyn. Opting for ampicillin due to suspected anaerobes due to gram positive cocci growth on respiratory culture and negative pneumonia panel, optimal coverage with ampicillin. Patient continuing to deteriorate with worsening oxygen requirements, poor prognosis. 11/9: Patient tolerated the ampicillin trial yesterday, will begin zosyn for anaerobe coverage. Leukocytosis improving, 40.5-->27.2. S/P 2 units PRBC transfusion yesterday and platelet transfusion, Hgb currently stable and platelets 36, will recheck platelet count in the afternoon. Patient continuing to require high ventilator settings. 11/10: Continuing with zosyn therapy, patient tolerating well. Leukocytosis continuing to improve, 27.2-->18.4.  Platelets currently 64, s/p transfusion 1 unit of platelets. Hgb continuing to drop, 7.7 on this AM labs. Will resume H&H checks Q6H, transfuse to maintain Hgb >7 with goal between 9 and 10 to maintain adequate oxygen delivery. Started albumin 25g Q6H for 4 doses to increase oncotic pressure. Continuing to require high ventilator settings, poor prognosis. ROS: Unable to obtain due to intubation and sedation  PMH:  Per HPI  SHX:  She is a former smoker with a 0.25 pack year history, She denies any drug or alcohol use  FHX: Mother: T2DM, HTN Father: Stroke Sister: T2DM, liver and lung cancer  Allergies: Cephalexin, lisinopril, losartan, fosamax  Medications:     sodium chloride      sodium chloride      vasopressin (Septic Shock) infusion 0.03 Units/min (11/10/21 0618)    vecuronium (NORCURON) infusion Stopped (11/06/21 1238)    norepinephrine (LEVOPHED) infusion 5 mcg/min (11/10/21 0903)    midazolam 6 mg/hr (11/10/21 0618)    propofol 20 mcg/kg/min (11/10/21 0618)    sodium chloride Stopped (11/10/21 0353)    dextrose        piperacillin-tazobactam  3,375 mg IntraVENous Q12H    albumin human  25 g IntraVENous Q6H    docusate  100 mg Per NG tube BID    fluconazole  400 mg IntraVENous Q24H    insulin lispro  0-18 Units SubCUTAneous G0O    folic acid  1 mg Oral Daily    pantoprazole  40 mg IntraVENous BID    senna  2 tablet Oral Nightly    [Held by provider] heparin (porcine)  5,000 Units SubCUTAneous 3 times per day    insulin glargine  25 Units SubCUTAneous Nightly    sodium chloride  500 mL IntraVENous Once    tiotropium-olodaterol  2 puff Inhalation Daily    [Held by provider] aspirin  81 mg Per NG tube Daily    lidocaine 1 % injection  5 mL IntraDERmal Once    sodium chloride flush  5-40 mL IntraVENous 2 times per day    [Held by provider] amLODIPine  5 mg Oral Daily    levothyroxine  75 mcg Oral Daily    metoprolol succinate  50 mg Oral Daily    atorvastatin  10 mg Oral Daily       Vital Signs:   T: 98.6: P: 91 RR: 23 B/P: 120/48:  FiO2: 70: O2 Sat:92: I/O: 2966/1497  Body mass index is 31.2 kg/m². Rossy Sanders PC: 20/12: TV: 380: RRTotal: 22: Ti:1 sec:   General:   Acute on chronically ill appearing elderly female   HEENT:  normocephalic and atraumatic. No scleral icterus. PERR  Neck: supple. No Thyromegaly. Lungs: clear to auscultation. No retractions  Cardiac: RRR. No JVD. Abdomen: soft. Nontender. Extremities:  No clubbing, cyanosis, bilateral 1+ pedal edema. Vasculature: capillary refill < 3 seconds. Palpable dorsalis pedis pulses. Skin:  warm and dry. Psych: Intubated and sedated on mechanical ventilation   Lymph:  No supraclavicular adenopathy. Neurologic:  No focal deficit. No seizures. Data: (All radiographs, tracings, PFTs, and imaging are personally viewed and interpreted unless otherwise noted). WBC 18.4, Hgb 7.7, Hct 23.7, Platelets 64  Na+ 294, K+ 4, Cl 104, CO2 23, BUN 72, Cr  Telemetry shows NSR  CXR 11/10 demonstrated patchy atelectasis at the right mid lung, worsening groundglass opacification across the left lung, and mild groundglass opacification at the right medial base   CXR 11/7 demonstrated diffuse bilateral interstitial opacities through the left lung as well as the right lung   Echo 11/6 demonstrated grade 1 diastolic dysfunction and increased LVOT velocity   CTA 10/25 demonstrated no PE, 2 cm nodule in the right upper lobe anteriorly, and relatively severe groundglass infiltrates throughout both lungs    Electronically signed by Rex Gavin DO on 11/10/2021 at 9:17 AM     Patient seen by me. Case discussed with resident physician. Infectious process responding to Zosyn. Still no progress on ventilator. Continue sedation. Italicized font represents changes to the note made by me. CC time 35 minutes. Time was discontiguous. Time does not include procedures. Time does include my direct assessment of the patient and coordination of care.   Electronically signed by Allan Mcgill MD on 11/10/2021 at 1:15 PM

## 2021-11-10 NOTE — SIGNIFICANT EVENT
Patient was transfused platelets over night when platelet count dropped to 20. This decision was made due to drops in patient's hemoglobin and concerns of bleeding. Felt the benefits of transfusion with dropping platelet count and trending down hemoglobin levels outweighed the risks of platelet transfusion, including risk of thrombotic event with pending workup for HIT. Will continue to trend platelet counts and Hgb and transfuse as necessary, patient continues to be critically ill.

## 2021-11-10 NOTE — PROGRESS NOTES
Pharmacy Renal Adjustment    Meng Epstein is a 79 y.o. female. Pharmacy renally adjust the following medications per P&T approved policy: fluconazole    Recent Labs     11/09/21  0311 11/10/21  0451   BUN 68* 72*   CREATININE 2.0* 1.6*     Estimated Creatinine Clearance: 26 mL/min (A) (based on SCr of 1.6 mg/dL (H)). Calculated CrCl:    Height:   Ht Readings from Last 1 Encounters:   10/21/21 5' 2\" (1.575 m)     Weight:  Wt Readings from Last 1 Encounters:   10/27/21 170 lb 9.6 oz (77.4 kg)     Baseline SCr: 0.9    Plan: Adjustments based on renal function:          Decrease fluconazole to 200mg IV daily.     Denver Smith, PharmD  2:19 PM  11/10/2021

## 2021-11-11 NOTE — PROGRESS NOTES
Patient:  Margie Simons    Unit/Bed:3A-04/004-A  MRN: [id number]   PCP: CESAR Ch CNP  Date of Admission: 10/21/2021    Assessment and Plan(All pulmonary edema, renal failure, PE, and respiratory failure diagnoses are acute in nature unless otherwise specified):        1. Acute hypoxic respiratory failure: Secondary to Covid-19. Patient was noted to have failed BiPAP therapy and was intubated on 11/2/2021. Goal to maintain lung protective strategies and keep peak pressure >35 and plateau pressure >17. Current vent settings exceed our safety range out of necessity to maintain patient oxygenation with worsening vent requirements. Will try to decrease vent settings as tolerated to get peak pressure less than 35 and plateau pressure less than 30.  2. Covid-19 Pneumonia: Tested positive for Covid-19 on 10/21/21. Was previously treated with empiric zith and meropenem for 7 days, D/Domingo on 10/28. Completed decadron and baricitinib therapy. Respiratory culture from 11/6 demonstrated moderate yeast and moderate gram positive cocci in pairs and clusters, suspect anaerobes due to Staph and Strep negative on pneumonia panel. Continue zosyn for anaerobic coverage. Zosyn lower strength due to renally dosing, GFR 31. Day 3 of zosyn therapy. 3. ARDS: P/F Ratio positive for severe ARDS. Secondary to Covid-19. Continue with lung protection strategies. Discontinued proning therapy secondary to development of pressure sores. 4. Sepsis: Sepsis appears to be improving, leukocytosis improving. Blood cultures and pneumonia panel negative. Respiratory culture demonstrated moderate gram positive cocci in clusters and pairs, suspect anaerobes due to negative staph and streph on PNA panel. Continue zosyn, Day 3 of antibiotic therapy. Zosyn lower strength due to renally dosing, GFR 31.   5. LINDSEY: Creatinine 1.6, was 1 on admission. Suspect prerenal etiology as BUN/Cr ratio >20.  FENa of 0.7%, suggests pre-renal cause of kidney injury. Completed 4 doses of 25g Albumin to increase oncotic pressure, creatinine currently stable. Nephrology consulted, appreciate the recs. 6. Hypotension: Currently requiring pressor support with levophed and vasopressin, requirements worsening. Continue to wean as tolerated. 7. Macrocytic Anemia: Hgb 9.9 and MCV 96.9. Not currently macrocytic, but s/p transfusion with anisocytosis present, suspect MCV appears normal secondary to transfusion. S/P transfusion 2 units PRBCs 11/8. No clinical signs of active bleeding, no hemoptysis or hematochezia. Continue folic acid supplementation, continue GI prophylaxis with protonix. Transfuse to maintain Hgb >7 with goal between 9 and 10 to maximize oxygen delivery. 8. Thrombocytopenia: Platelets 32 on AM labs, s/p transfusion 11/9. DIC and ITP ruled out through lab work, HIT panel pending. Likely secondary to sepsis and active Covid-19 infection. No signs of active bleeding. Goal to transfuse only if platelets decrease <08, as workup for HIT still pending and do want to transfuse and increase risk for thrombotic event. Threshold for platelet transfusion can increase to <20 if Hgb acutely drops due to risks of occult bleeding. 9. Candidiasis: Respiratory culture demonstrated moderate budding yeast and pseudohyphae, positive growth for candida albicans. Will continue with diflucan, day 4 of therapy. 10. Hx of HFpEF: Echo on 11/6 demonstrated grade 1 diastolic dysfunction, increased peak left ventricular outflow tract velocity, EF 65-70%. Recommend better hydration and control of the HR to help reduce the LVOT gradient. 11. High Anion Gap Metabolic Acidosis, resolved: Resolved, continue to monitor. Consider bicarb drip if anion gap opens. 12. HTN: Holding home meds  13. HLD: Continue Lipitor  14. T2DM: Continue lantus and high dose sliding scale   15.  Hypothyroidism:  Continue synthroid  16. Pulmonary Nodule: 2 cm nodule/mass lesion in the right upper lobe anteriorly noted on CTA. . Work up outpatient, recommended repeat CT in 3-6 months for reevaluation. CC:  SOB, Covid-19  HPI: Patient presented to [facility] on 10/21 with complaints of shortness of breath and was found to be Covid-19 positive and hypoxic. She was empirically started on meropenem and zith and was started on IV decadron and baricitinib. Antibiotic therapy was D/Domingo on 10/28 after 7 days of therapy. Her oxygen requirements increased on 10/24 from 5L NC to high flow nasal cannula and eventually BiPAP later that evening due to worsening hypoxia. Patient was intubated on 11/2 with consent from [address] [place] [address]. She was transferred to the ICU for further care. 11/4: LINDSEY continued to worsen, will consult nephrology due to LINDSEY and hypernatremia and hyperkalemia. TOF 0/4 with minimal dose of vecuronium, will D/C paralytic. Continues to have significant leukocytosis, but all cultures up to this point have remained negative. Continue with ventilation. 11/5: No improvement of LINDSEY, nephrology consulted. Hyperkalemia has resolved and hypernatremia has improved. Hgb continuing to drop, 8.7 this AM and noted to be macrocytic. Will order thyroid studies, B12, folate, blood smear, begin stress ulcer prophylaxis with protonix 40mg BID. Continuing to have significant leukocytosis, will repeat inflammatory markers for possible macrophage activation syndrome. Continue gentle IVF. 11/8: LINDSEY continuing to improve, nephrology switched fluids from NS to D5W. Hgb continuing to drop, 7.3 on most recent labs with macrocytosis. Continue with folic acid, will transfuse 2 units PRBC today. Respiratory culture now demonstrating gram positive cocci in clusters and pairs, will trial ampicillin high dose and if tolerated, will begin zosyn. Opting for ampicillin due to suspected anaerobes due to gram positive cocci growth on respiratory culture and negative pneumonia panel, optimal coverage with ampicillin.  Patient continuing to deteriorate with worsening oxygen requirements, poor prognosis. 11/9: Patient tolerated the ampicillin trial yesterday, will begin zosyn for anaerobe coverage. Leukocytosis improving, 40.5-->27.2. S/P 2 units PRBC transfusion yesterday and platelet transfusion, Hgb currently stable and platelets 36, will recheck platelet count in the afternoon. Patient continuing to require high ventilator settings. 11/10: Continuing with zosyn therapy, patient tolerating well. Leukocytosis continuing to improve, 27.2-->18.4. Platelets currently 64, s/p transfusion 1 unit of platelets. Hgb continuing to drop, 7.7 on this AM labs. Will resume H&H checks Q6H, transfuse to maintain Hgb >7 with goal between 9 and 10 to maintain adequate oxygen delivery. Started albumin 25g Q6H for 4 doses to increase oncotic pressure. Continuing to require high ventilator settings, poor prognosis. 11/11: Leukocytosis continuing to improve, 18.4-->14.3 Platelets decreased to 32 from 64. Hgb currently stable at 9.9. Recheck H&H and platelets this afternoon, desire to only transfuse platelets if platelet count drops to <10. Finished 4 doses of albumin 25g to increase oncotic pressure. Cr currently stable at 1.6. Vent requirements continuing to worsen, peak pressures now exceeding 35 out of safety zone out of necessity to maintain patient oxygenation. Discussed prognosis and patient condition with Jamel Jeffries yesterday, desire to give patient a few more days and then consider withdrawing care if continuing to not improve.      ROS: Unable to obtain due to intubation and sedation  PMH:  Per HPI  SHX:  She is a former smoker with a 0.25 pack year history, She denies any drug or alcohol use  FHX: Mother: T2DM, HTN Father: Stroke Sister: T2DM, liver and lung cancer  Allergies: Cephalexin, lisinopril, losartan, fosamax  Medications:     sodium chloride      sodium chloride      sodium chloride      vasopressin (Septic Shock) infusion 0.0167 Units/min (11/11/21 0630)    norepinephrine (LEVOPHED) infusion 3 mcg/min (11/11/21 0630)    midazolam 6 mg/hr (11/11/21 0630)    propofol 20 mcg/kg/min (11/11/21 0630)    sodium chloride Stopped (11/10/21 0353)    dextrose        chlorhexidine  15 mL Mouth/Throat BID    fluconazole  200 mg IntraVENous Q24H    piperacillin-tazobactam  3,375 mg IntraVENous Q12H    docusate  100 mg Per NG tube BID    insulin lispro  0-18 Units SubCUTAneous H2E    folic acid  1 mg Oral Daily    pantoprazole  40 mg IntraVENous BID    senna  2 tablet Oral Nightly    [Held by provider] heparin (porcine)  5,000 Units SubCUTAneous 3 times per day    insulin glargine  25 Units SubCUTAneous Nightly    sodium chloride  500 mL IntraVENous Once    tiotropium-olodaterol  2 puff Inhalation Daily    [Held by provider] aspirin  81 mg Per NG tube Daily    lidocaine 1 % injection  5 mL IntraDERmal Once    sodium chloride flush  5-40 mL IntraVENous 2 times per day    [Held by provider] amLODIPine  5 mg Oral Daily    levothyroxine  75 mcg Oral Daily    metoprolol succinate  50 mg Oral Daily    atorvastatin  10 mg Oral Daily       Vital Signs:   T: 99.5: P: 89 RR: 17 B/P: 113/48: FiO2: 75: O2 Sat:90: I/O: 2943/1350  Body mass index is 31.2 kg/m². Vivian Stauffer PC: 22/12: TV: 389: RRTotal: 16: Ti:1 sec:   General:   Acute on chronically ill appearing elderly female   HEENT:  normocephalic and atraumatic. No scleral icterus. PERR  Neck: supple. No Thyromegaly. Lungs: clear to auscultation. No retractions  Cardiac: RRR. No JVD. Abdomen: soft. Nontender. Extremities:  No clubbing, cyanosis, bilateral 1+ pedal edema. Vasculature: capillary refill < 3 seconds. Palpable dorsalis pedis pulses. Skin:  warm and dry. Psych: Intubated and sedated on mechanical ventilation   Lymph:  No supraclavicular adenopathy. Neurologic:  No focal deficit. No seizures.     Data: (All radiographs, tracings, PFTs, and imaging are personally viewed and interpreted unless otherwise noted).  WBC 14.3, Hgb 9.9, Hct 31.6, Platelets 32   Na+ 114, K+ 3.3, Cl 108, CO2 25, BUN 69, Cr 1.6   Telemetry shows NSR  · CXR 11/10 demonstrated patchy atelectasis at the right mid lung, worsening groundglass opacification across the left lung, and mild groundglass opacification at the right medial base   · CXR 11/11 demonstrated interstitial and alveolar opacities in both longs, greater on the left side and worsened since previous imaging   · Echo 11/6 demonstrated grade 1 diastolic dysfunction and increased LVOT velocity   · CTA 10/25 demonstrated no PE, 2 cm nodule in the right upper lobe anteriorly, and relatively severe groundglass infiltrates throughout both lungs    Electronically signed by Manuel Butterfield DO on 11/11/2021 at 7:20 AM                                            Patient seen by me. Case discussed with resident physician. Patient making little to no progress on mechanical ventilator. Persistently critically ill. Unable to wean. Continue with antibiotics. HIT panel still pending. Italicized font represents changes to the note made by me. CC time 35 minutes. Time was discontiguous. Time does not include procedures. Time does include my direct assessment of the patient and coordination of care.   Electronically signed by Markus Alcala MD on 11/11/2021 at 6:11 PM

## 2021-11-11 NOTE — PROGRESS NOTES
[facility]   Nephrology progress note  11/11/2021, 8:47 AM      Pt Name:    Sofía Arce  MRN:     [id number]     [place]:    1940  Admit Date:    10/21/2021 11:02 AM  Primary Care Physician:  CESAR Calero CNP   Room number  3A-04/004-A    Chief Complaint: Nephrology following for LINDSEY    Subjective:  Patient seen and examined  Seen during rounds  Patient is on 75% FiO2  On Levophed  On vasopressin  Also on free water flushes 300 mL every 6 hours  Urine output 1.3 L  Overall positive fluid balance      Objective:  24HR INTAKE/OUTPUT:      Intake/Output Summary (Last 24 hours) at 11/11/2021 0847  Last data filed at 11/11/2021 0630  Gross per 24 hour   Intake 2943.72 ml   Output 1000 ml   Net 1943.72 ml     I/O last 3 completed shifts: In: 2943.7 [I.V.:615.2; Blood:1000; NG/GT:1037; IV Piggyback:291.5]  Out: 3618 [Urine:1350]  No intake/output data recorded. Admission weight: 190 lb (86.2 kg)  Wt Readings from Last 3 Encounters:   10/27/21 170 lb 9.6 oz (77.4 kg)   07/19/21 187 lb (84.8 kg)   03/11/21 187 lb (84.8 kg)     Body mass index is 31.2 kg/m².     Physical examination  VITALS:     Vitals:    11/11/21 0530 11/11/21 0545 11/11/21 0600 11/11/21 0754   BP: (!) 118/48 (!) 116/48 (!) 113/48    Pulse: 76 76 89    Resp: 18 18 17    Temp:       TempSrc:       SpO2: 90% (!) 89% 90% 91%   Weight:       Height:         Limited examination secondary to COVID-19 positivity in isolation status  Currently in prone position  General Appearance: Ill-appearing, intubated  Mouth/Throat: ET tube in place  Extremities: Bilateral 2+ edema, stable      Lab Data  CBC:   Recent Labs     11/09/21  0311 11/09/21  0800 11/10/21  0000 11/10/21  0451 11/10/21  1450 11/10/21  2118 11/11/21 0229 11/11/21  0407   WBC 27.2*  --   --  18.4*  --   --   --  14.3*   HGB 10.4*   < >  --  7.7*   < > 8.6* 10.0* 9.9*   HCT 32.0*   < >  --  23.7*   < > 27.2* 31.0* 31.6*   PLT 36*   < > 20* 64*  --   --   -- 32*    < > = values in this interval not displayed.      BMP:  Recent Labs     11/09/21  0311 11/10/21  0451 11/11/21  0407    141 144   K 5.0 4.0 3.3*    104 108   CO2 24 23 25   BUN 68* 72* 69*   CREATININE 2.0* 1.6* 1.6*   GLUCOSE 93 162* 146*   CALCIUM 8.8 8.6 8.8     Hepatic:   Recent Labs     11/10/21  0451   LABALBU 2.6*   AST 37   ALT 22   BILITOT 0.9   ALKPHOS 93         Meds:  Infusion:    sodium chloride      sodium chloride      sodium chloride      vasopressin (Septic Shock) infusion 0.0167 Units/min (11/11/21 0630)    norepinephrine (LEVOPHED) infusion 3 mcg/min (11/11/21 0630)    midazolam 6 mg/hr (11/11/21 0630)    propofol 20 mcg/kg/min (11/11/21 0630)    sodium chloride Stopped (11/10/21 0353)    dextrose       Meds:    chlorhexidine  15 mL Mouth/Throat BID    fluconazole  200 mg IntraVENous Q24H    piperacillin-tazobactam  3,375 mg IntraVENous Q12H    docusate  100 mg Per NG tube BID    insulin lispro  0-18 Units SubCUTAneous F2Z    folic acid  1 mg Oral Daily    pantoprazole  40 mg IntraVENous BID    senna  2 tablet Oral Nightly    [Held by provider] heparin (porcine)  5,000 Units SubCUTAneous 3 times per day    insulin glargine  25 Units SubCUTAneous Nightly    sodium chloride  500 mL IntraVENous Once    tiotropium-olodaterol  2 puff Inhalation Daily    [Held by provider] aspirin  81 mg Per NG tube Daily    lidocaine 1 % injection  5 mL IntraDERmal Once    sodium chloride flush  5-40 mL IntraVENous 2 times per day    [Held by provider] amLODIPine  5 mg Oral Daily    levothyroxine  75 mcg Oral Daily    metoprolol succinate  50 mg Oral Daily    atorvastatin  10 mg Oral Daily     Meds prn: potassium chloride **OR** potassium alternative oral replacement **OR** potassium chloride, potassium chloride, sodium chloride, sodium chloride, sodium chloride, sodium chloride flush, sodium chloride, LORazepam, albuterol sulfate HFA, glucose, dextrose, glucagon (rDNA), dextrose, ondansetron **OR** ondansetron, polyethylene glycol, [DISCONTINUED] acetaminophen **OR** acetaminophen       Impression and Plan:  1. LINDSEY secondary to ischemic ATN in setting of COVID-19 induced diffuse inflammatory syndrome and septic shock. LINDSEY further exacerbated by acute vasomotor constriction from acute hypercarbia  Remains nonoliguric but overall positive fluid balance  We will give low-dose Bumex and monitor response--0.5 mg IV every 12 hours x3 doses  Chest x-ray shows worsening infiltrates  Albumin is 2.6  We will supplement hemodynamics with IV albumin-12.5 g every 12 hours x 3 doses    2. Acute hypoxic hypercarbic respiratory failure with severe respiratory acidemia. Vent management per pulmonary. 3.  Hypokalemia. Replace and recheck. KCl 20 mEq via central line x 3 doses  4. Hypernatremia. Resolved. Will monitor, reduce free water--see orders  5. Azotemia: Multifactorial including catabolic state, recent steroids and LINDSEY  6. Acute hypoxic respiratory failure secondary to COVID-19 pneumonia  7. ARDS  8. Septic shock with marked leukocytosis  9. IDDM  10.   Thrombocytopenia    Discussed with ICU RN    Misa Bauer MD  [facility]

## 2021-11-12 NOTE — SIGNIFICANT EVENT
Discussion at bedside with patient's grandson Ted Price who is designated POA. Discussed patient's current status on maximum FiO2 and high ventilator settings with failure to oxygenate appropriately. Discussed patient's lack of cough or gag with suctioning despite minimal sedation and the implications for permanent neurologic injury even if patient makes recovery from COVID-19. Ted Price discussed his feelings and feelings of his family who live outside of the state that continue treatment will not enhance patient's quality or duration of life and will likely prolong suffering. The decision was made to make patient a limited code with no shocks, resuscitative medications or CPR. Ted Price and his wife have requested to have arranged withdrawal of care to take place at 2 PM on 11/13/2021 with withdrawal of all life supportive devices.

## 2021-11-12 NOTE — PROGRESS NOTES
Comprehensive Nutrition Assessment    Type and Reason for Visit:  Reassess    Nutrition Recommendations/Plan:   Continue current TF (Vital 1.2 at 40 ml/hr). Free water flush per MD (currently 250 ml every 6 hours). Will monitor need to increase TF goal rate if Diprovan remains at current rate. Monitor renal function vs. Need to adjust TF (formula, rate, etc). Nutrition Assessment:    Pt. Improving from a nutritional standpoint AEB tolerating TF at current goal rate. Remains at risk for further nutritional compromise r/t severe malnutrition, +covid 10/21; had been on bipap/high flow prior to intubation 11/2 with inability to consume much po first 11d of admit, advanced age, overweight, LOS day 22, and underlying medical condition (hx: pulmonary mass not new/stable, former smoker, CAD, CHF, CKD, DM - A1c 6.3%, GERD, Hypercalcemia, Hyperlipidemia, HTN, Low vitamin D, Macrocytosis, Takotsubo cadiomyopathy).     Malnutrition Assessment:  Malnutrition Status:  Severe malnutrition    Context:  Acute Illness     Findings of the 6 clinical characteristics of malnutrition:  Energy Intake:  7 - 50% or less of estimated energy requirements for 5 or more days (not able to tolerate being of bipap to consume meals)  Weight Loss:   (note 8.9% lossin the last 3 months)     Body Fat Loss:  No significant body fat loss     Muscle Mass Loss:  No significant muscle mass loss    Fluid Accumulation:  No significant fluid accumulation     Strength:  Not Performed    Estimated Daily Nutrient Needs:  Energy (kcal):  0762-6224 (30-32 kcals/kg) - late phase; Weight Used for Energy Requirements:  Ideal (50kg - ideal)     Protein (g):  ~100 (~2.0 grams/kg) - if renal function allows; Weight Used for Protein Requirements:  Ideal (50kg - ideal)        Fluid (ml/day):  per MD; Method Used for Fluid Requirements:  Other (Comment)      Nutrition Related Findings:   Pt.  Remains intubated; Diprovan infusing; +BMs today per RN;  RN Coordination of Nutrition Care:  Continue to monitor while inpatient    Goals:  Patient will receive EN appropriate for COVID recovery process       Nutrition Monitoring and Evaluation:   Behavioral-Environmental Outcomes:  None Identified   Food/Nutrient Intake Outcomes:  Enteral Nutrition Intake/Tolerance  Physical Signs/Symptoms Outcomes:  Biochemical Data, GI Status, Fluid Status or Edema, Hemodynamic Status, Nutrition Focused Physical Findings, Skin, Weight     Discharge Planning:     Too soon to determine     Electronically signed by Naa Stacy RD, LD on 11/12/21 at 11:44 AM EST    Contact: [phone]

## 2021-11-12 NOTE — PROGRESS NOTES
Patient:  Gerard Erickson    Unit/Bed:3A-04/004-A  MRN: [id number]   PCP: CESAR Roa CNP  Date of Admission: 10/21/2021    Assessment and Plan(All pulmonary edema, renal failure, PE, and respiratory failure diagnoses are acute in nature unless otherwise specified):        Acute hypoxic respiratory failure: Secondary to COVID-19 pneumonia. Patient required intubation 11/2/21 after failure of BiPAP support. Maintain PCV ventilation. Maintain lung protection strategies. Maintaining adequate tidal volumes. Now on maximum ventilator settings w/ FiO2 of 100%. SPO2 maintained at 90%. ARDS: Secondary to COVID-19 pneumonia. Undergoing lung protection strategies with VTE goal 4-6 cc/kg and maintenance of peak pressures less than 35 and plateau less than 30. D/c proning 2/2 pressure ulcers   COVID-19 pneumonia: Tested positive 10/21/21. Status post Decadron and baricitinib therapy. Septic shock (resolved): POA 2/2 COVID19 PNA. PNA panel neg. Respiratory cutlure consistent w/ oral candidiasis. Not on Zosyn for empiric coverage of suspected bacterial superinfection given elevated procal and purulent sputum. Acute kidney injury: Prerenal, non oliguric. Secondary to hypotension. Renal ultrasound negative for acute pathology. Non-oliguric. Cr 1.4 trending down. Neph managing with gentle fluids. No CRRT needed at this time. Acute on chronic macrocytic anemia: Hemoglobin has remained stable at 9.1. Will space out H&H checks to every 12 hours. Folic acid is low, will start supplementation. No active signs of bleeding. Keep hemoglobin >7.0. Mild hypernatremia: Free water flushes increased today. D5% increased to 75 mL/h per Neph. Monitor BMP daily. Diabetes mellitus type 2:  Continue Lantus with hold parameters. SSI Q4H. Hypoglycemia protocol + accuchecks  Chronic HFpEF: EF unknown, echo attempted but no images were able to be obtained due to significant lung interference. Continue to monitor.  Will order echo again today to re-attempt. Net +7L since admission, but intravascularly volume depleted based upon hypernatremia, elevated BUN:Cr ratio, concentrated urine. Continue D5% as above. Strict I/O. Start daily weights (no weight documented since admit). CAD, history of: continue aspirin daily. GERD, history of: Continue Protonix. Hyperlipidemia, history of: Continue statin. Hypertension, history of: Presently hypotensive on pressors. Hypothyroidism, history of: Continue Synthroid. Pulmonary Nodule: 2 cm nodule/mass lesion in the right upper lobe anteriorly noted on CTA. . Work up outpatient, recommended repeat CT in 3-6 months for reevaluation. CC: COVID-19 pneumonia  HPI: Jennifer Fuller is an 48-year-old female former smoker with past medical history of CAD, chronic HFpEF, CKD, diabetes mellitus type 2, GERD, hyperlipidemia, hypertension, hypothyroidism, osteoarthritis, osteoporosis. Patient presented to [facility] ED on 10/21/2021 with complaints of dyspnea. She was found to be positive for COVID-19. She was hypoxic upon arrival requiring supplemental O2. She was empirically started on meropenem and azithromycin. She started IV Decadron and baricitinib for COVID-19. She is admitted to the hospitalist service. She completed 7 days of antibiotic therapy on 10/21/2021. There has been no bacterial organisms identified on infectious work-up. On 10/24/2021, patient was requiring increased FiO2 requirements. She was escalated to high flow nasal cannula. She was later started on BiPAP. She eventually required intubation on 11/2/2021 after failure of high flow nasal cannula and BiPAP therapies. Patient was transferred to ICU for further care. She required paralytic therapy for vent synchrony. She developed hypotension requiring vasopressor support. On 11/5, she developed worsening acute kidney injury requiring nephrology consultation.     For further details, please see assessment and plan.    Update 11/12/2021 discussion with patient's grandson at bedside. Discussed Mrs. Kevin Laurent is overall worsening condition with increased ventilator requirements, skin breakdown, malnutrition, and lack of cough or gag despite minimal sedation with only 10 mg of propofol. Discussed goals of care. Patient's grandson Eliceo Graves understands that patient is now on maximal ventilator settings and she is receiving the greatest care that we can provide without causing further suffering. Eliceo Graves has requested all life support be withdrawn when he and his family can come visit at 2 PM tomorrow. Arrangements have been made with the nursing team and palliative care. Per my discussion with Eliceo Graves who is the patient's designated power of  the patient is to be made a limited code x4. ROS: Unable to obtain as patient is intubated and mechanically ventilated. PMH:  Per HPI  SHX: Former smoker. Denies alcohol or substance use. FHX: Liver cancer, lung cancer, diabetes, stroke, hypertension. Allergies: Cephalexin, lisinopril, losartan, Fosamax.   Medications:     sodium chloride Stopped (11/11/21 1900)    sodium chloride Stopped (11/11/21 1900)    sodium chloride Stopped (11/11/21 1900)    vasopressin (Septic Shock) infusion 0.03 Units/min (11/12/21 0815)    norepinephrine (LEVOPHED) infusion Stopped (11/12/21 0320)    midazolam 5 mg/hr (11/11/21 2330)    propofol 9.905 mcg/kg/min (11/12/21 1459)    sodium chloride 25 mL/hr at 11/11/21 2000    dextrose        bumetanide  0.5 mg IntraVENous Q12H    albumin human  12.5 g IntraVENous Q12H    chlorhexidine  15 mL Mouth/Throat BID    fluconazole  200 mg IntraVENous Q24H    piperacillin-tazobactam  3,375 mg IntraVENous Q12H    docusate  100 mg Per NG tube BID    insulin lispro  0-18 Units SubCUTAneous L2A    folic acid  1 mg Oral Daily    pantoprazole  40 mg IntraVENous BID    senna  2 tablet Oral Nightly    [Held by provider] heparin (porcine)  5,000 Units SubCUTAneous 3 times per day    insulin glargine  25 Units SubCUTAneous Nightly    sodium chloride  500 mL IntraVENous Once    tiotropium-olodaterol  2 puff Inhalation Daily    [Held by provider] aspirin  81 mg Per NG tube Daily    lidocaine 1 % injection  5 mL IntraDERmal Once    sodium chloride flush  5-40 mL IntraVENous 2 times per day    [Held by provider] amLODIPine  5 mg Oral Daily    levothyroxine  75 mcg Oral Daily    metoprolol succinate  50 mg Oral Daily    atorvastatin  10 mg Oral Daily       Vital Signs:   T: 98.4: P: 100 RR: 22 B/P: 101/48: FiO2: 70%: O2 Sat: 100%: I/O: 3649/1475 (+2174)   GCS: 3 (paralyzed, vent, sedated)  Body mass index is 31.2 kg/m². Rossy Sanders PC: 22/8: TV: 350: RRTotal: 20: Ti:1 sec:   General:   Acute on chronically ill adult female. Appears stated age. HEENT:  normocephalic and atraumatic. No scleral icterus. PERR  Neck: supple. No Thyromegaly. Lungs: clear to auscultation. No retractions  Cardiac: Tachycardic. No JVD. Abdomen: soft. Nontender. Extremities:  No clubbing, cyanosis, or edema x 4. Vasculature: capillary refill < 3 seconds. Palpable dorsalis pedis pulses. Skin:  warm and dry. Psych:  Unable to assess due to sedation  Lymph:  No supraclavicular adenopathy. Neurologic:  No focal deficit. No seizures. Data: (All radiographs, tracings, PFTs, and imaging are personally viewed and interpreted unless otherwise noted). Telemetry: NSR. Rate 97. No ectopy. BMP sodium 144 potassium 3.6 chloride 108 bicarb 24 BUN/creatinine 64/1.4 anion gap 12 GFR 36 magnesium 1.7 glucose 222 calcium 9.4  CBC WBC 8.3 H&H 9.1/28.5 platelets 18  Urine culture 11/2/21: Preliminary no growth  Respiratory culture 11/2: Normal candie  Molecular pneumonia panel 11/2: Negative  Blood culture 1 11/2: preliminary no growth  Blood culture 2 11/2: Preliminary no growth  Chest x-ray 11/4/2021 report: Improving bilateral atelectasis/infiltrate. KUB 11/6/2021 report: Nonobstructive bowel gas pattern.   Renal ultrasound 11/5/2021 report: Probable right renal cyst but otherwise unremarkable renal ultrasound. ABG 11/7/2021 pH 7.2 PCO2 63 PO2 59 bicarb 24      Case discussed with Dr. Cheo Patel                  Electronically signed by Benny Munson DO on 11/12/2021 at 3:01 PM  Patient seen by me. Case discussed with resident physician. Critically ill. Very poor prognosis. Italicized font represents changes to the note made by me. CC time 35 minutes. Time was discontiguous. Time does not include procedures. Time does include my direct assessment of the patient and coordination of care.   Electronically signed by Ary Kim MD on 11/15/2021 at 5:52 PM

## 2021-11-12 NOTE — CARE COORDINATION
10/27/21, 12:31 PM EDT    DISCHARGE ON GOING [address] day: 6  Location: [facility]22/022-A Reason for admit: Hyponatremia [E87.1]  Generalized weakness [R53.1]  Acute respiratory failure with hypoxia ([facility] [address] [J96.01]  Acute disease due to severe acute respiratory syndrome coronavirus 2 (SARS-CoV-2) [U07.1]  COVID-19 [U07.1]   Procedure: none  Barriers to Discharge: Remains bipap dependent, 18/14, with FIO2 100%, sats 94%. Afebrile. NSR. Ox4. Palliative Care following. PT/OT. IS, Acapella. Telemetry. D5/45, norvasc, asa, lipitor, IV zithromax, baricitinib, IV decadron, lovenox, lantus, SSI, synthroid, IV ativan nightly, IV merrem, toprol xl, prn zofran. Received 20 mg IV lasix x1, 0.5 mg IV ativan x1, and 2 mg IV morphine x2 today. PCP: CESAR Bernstein CNP  Readmission Risk Score: 13.7%  Patient Goals/Plan/Treatment Preferences: From home alone. Plan pending clinical course. Wants [facility] if needs home O2.
11/10/21, 3:26 PM EST    DISCHARGE ON GOING EVALUATION    Fanjerry Barboza day: 20  Location: -04/004-A Reason for admit: Hyponatremia [E87.1]  Generalized weakness [R53.1]  Acute respiratory failure with hypoxia ([facility] [address] [J96.01]  Acute disease due to severe acute respiratory syndrome coronavirus 2 (SARS-CoV-2) [U07.1]  COVID-19 [U07.1]   Procedure:   11/2 Intubated  11/3 CVC right subclavian  11/5 Renal US: Probable right renal cyst but otherwise unremarkable renal ultrasound  11/6 KUB: Nonobstructive bowel gas pattern  11/10 CXR: Slight worsening of pulmonary findings, see above for discussion    Barriers to Discharge: Grayce Cool stopped d/t development of pressure sores. Received 1 pk platelets overnight. TF restarted. Remains on vent w/ETT on PCMV, peep 12, FIO2 70%, sats 91%. Afebrile. NSR. Unable to follow commands. Intensivist and Nephrology following. Palliative Care and Dietitian following. Respiratory culture +Candida albicans. Telemetry, CVC, OG w/TF, wound care, hogan. Versed @ 6 mg/hr, levo @ 5 mcg/min, diprivan @ 20 mcg/kg/min, vasopressin @ 0.03 units/min, lipitor, IV diflucan, folic acid, lantus, SSI, synthroid, protonix, IV zosyn, inhaler. BUN 72, Creat 1.6, alb 2.6, direct bili 0.7, wbc 18.4, hgb 7.7- now 7.1, plt 28 - now 64, ferritin 1271. PCP: CESAR Morgan CNP  Readmission Risk Score: 21.3 ( )%  Patient Goals/Plan/Treatment Preferences: From home alone. Plan pending clinical course.  SW on case. 
11/12/21, 3:17 PM EST    DISCHARGE ON GOING EVALUATION    Wm Cisneros day: 22  Location: 3A-04/004-A Reason for admit: Hyponatremia [E87.1]  Generalized weakness [R53.1]  Acute respiratory failure with hypoxia ([address] [J96.01]  Acute disease due to severe acute respiratory syndrome coronavirus 2 (SARS-CoV-2) [U07.1]  COVID-19 [U07.1]   Procedure:   11/2 Intubated  11/3 CVC right subclavian  11/5 Renal US: Probable right renal cyst but otherwise unremarkable renal ultrasound  11/6 KUB: Nonobstructive bowel gas pattern  11/12 CXR:   Bilateral pulmonary alveolar and interstitial opacities are unchanged   Mild bilateral basilar atelectasis     Barriers to Discharge:  Remains on vent w/ETT on AC/PC, peep 12, FIO2 100%, sats 90%. Tmax 99.5. NSR. Unable to follow commands. Intensivist and Nephrology following. Palliative Care and Dietitian following. Respiratory culture +Candida albicans. Telemetry, CVC, OG w/TF, free water flushes, wound care, hogan. Versed @ 5 mg/hr, diprivan @ 10 mcg/kg/min, vasopressin @ 0.03 units/min, IV albumin Q12H, lipitor, IV bumex 0.5 mg Q12H, IV diflucan, folic acid, lantus, SSI, synthroid, toprol xl, protonix, IV zosyn, inhaler, Electrolyte replacement protocols. PCP: CESAR Leavitt CNP  Readmission Risk Score: 19.9 ( )%  Patient Goals/Plan/Treatment Preferences: From home alone. Plan pending clinical course.  SW on case. 
11/4/21, 3:00 PM EDT    DISCHARGE ON GOING [address] day: 14  Location: -04/004-A Reason for admit: Hyponatremia [E87.1]  Generalized weakness [R53.1]  Acute respiratory failure with hypoxia ([facility] [address] [J96.01]  Acute disease due to severe acute respiratory syndrome coronavirus 2 (SARS-CoV-2) [U07.1]  COVID-19 [U07.1]   Procedure:   11/2 Intubated  11/3 CVC right subclavian  11/4 CXR: Improving bilateral atelectasis/infiltrate    Barriers to Discharge: Paralytic drip stopped this afternoon. Remains on vent w/ETT on AC/PC, peep 8, FIO2 55%, sats 92%. Tmax 99.9. 's. Unable to follow commands. Palliative Care following. Telemetry, CVC, OG w/TF, hogan. IVF, versed @ 3 mg/hr, levo @ 18 mcg/min, diprivan @ 30 mcg/kg/min, asa, lipitor, lovenox, lantus, SSI, synthroid, prn IV ativan, toprol xl, inhaler. PCP: CESAR Bingham CNP  Readmission Risk Score: 22%  Patient Goals/Plan/Treatment Preferences: From home alone. Plan pending clinical course. SW on case.
11/5/21, 2:12 PM EDT    DISCHARGE ON GOING [address] day: 15  Location: -04/004-A Reason for admit: Hyponatremia [E87.1]  Generalized weakness [R53.1]  Acute respiratory failure with hypoxia ([facility] [address] [J96.01]  Acute disease due to severe acute respiratory syndrome coronavirus 2 (SARS-CoV-2) [U07.1]  COVID-19 [U07.1]   Procedure:   11/2 Intubated  11/3 CVC right subclavian    Barriers to Discharge: No improvement in LINDSEY; Nephrology consulted. Paralytic drip restarted d/t vent dyssynchrony. Remains paralyzed and sedated on vent w/ETT on AC/PC, peep 8, FIO2 70%, sats 90%. Tmax 100. 's. Palliative Care following. Telemetry, CVC, OG w/TF, hogan. IVF, versed @ 4 mg/hr, levo @ 15 mcg/min, diprivan @ 50 mcg/kg/min, vecuronium @ 0.5 mcg/kg/min, asa, lipitor, sq heparin, lantus, SSI, synthroid, prn IV ativan, toprol xl, protonix, inhaler. Na+ 148, BUN 99, Creat 2.7, procal 0.35, crp 1.61, , tsh 0.073, free T4 0.57, ferritin 1649, folate 4.5, vit B12 1544. PCP: CESAR Marlow CNP  Readmission Risk Score: 22.8%  Patient Goals/Plan/Treatment Preferences: From home alone. Plan pending clinical course.  SW on case.    
11/9/21, 1:13 PM EST    DISCHARGE ON GOING EVALUATION    Milton Leonard day: 19  Location: -04/004-A Reason for admit: Hyponatremia [E87.1]  Generalized weakness [R53.1]  Acute respiratory failure with hypoxia ([address] [J96.01]  Acute disease due to severe acute respiratory syndrome coronavirus 2 (SARS-CoV-2) [U07.1]  COVID-19 [U07.1]   Procedure:   11/2 Intubated  11/3 CVC right subclavian  11/5 Renal US: Probable right renal cyst but otherwise unremarkable renal ultrasound  11/6 KUB: Nonobstructive bowel gas pattern    Barriers to Discharge: Received 1 Pk plts today. Pronation therapy continues. Remains on vent w/ETT on AC/PC, peep 12, FIO2 70%, sats 92%. Tmax 99.5. NSR. Unable to follow commands. Intensivist and Nephrology following. Palliative Care and Dietitian following. Respiratory culture +Candida albicans. Telemetry, CVC, OG to samira RENNER. Versed @ 5 mg/hr, levo @ 18 mcg/min, diprivan @ 30 mcg/kg/min, vasopressin @ 0.03 units/min, lipitor, IV diflucan, folic acid, lantus, SSI, synthroid, protonix, IV zosyn, inhaler. BUN 68, Creat 2, wbc 27.2, hgb 10.4, plt 28, fibrinogen 688. PCP: CESAR Fernandez CNP  Readmission Risk Score: 20.1 ( )%  Patient Goals/Plan/Treatment Preferences: From home alone. Plan pending clinical course.  SW on case. 
DISCHARGE/PLANNING EVALUATION  11/1/21, 3:25 PM EDT    Reason for Referral: discharge planning  Mental Status: currently on Bipap  Decision Making: self, holland Diggs for support  Family/Social/Home Environment: lives at home alone. Holland stops by every weekend and during week as needed  Current Services including food security, transportation and housekeeping: ritu garcía pt has been independent in her cares at home, does her own cooking, cleaning, transportation. Current Equipment:  Payment Source:Medicare  Concerns or Barriers to Discharge: COVID +  Post acute provider list with quality measures, geographic area and applicable managed care information provided. Questions regarding selection process answered:provided list for holland    Teach Back Method used with holland Diggs regarding care plan and needs  Joshua Moura verbalize understanding of the plan of care and contribute to goal setting. Patient goals, treatment preferences and discharge plan: Return to home if possible. Holland reviewing ECF list to determine preferences.      Electronically signed by Cesilia Moore on 11/1/2021 at 3:25 PM
Discharge Planning Update Note:   96% O2 sat on PAP. Confused. Telesitter. High dose Decadron with 20 mg on 10/24-10/28, Decadron 10 mg 10/29-11/02. Baricitinib 10/21-current  Incentive spirometer and Acapella valve as able  Maintenance IV fluids with D5W/half-normal saline as patient has been unable to come off the BiPAP over the previous 72 hours. Dietitian consult for nutrition concern. Dietitian consult for tube feeds versus TPN. Concern for NGT given desaturationss. Continue with empiric azithromycin and Merrem given severe allergic reaction to cephalosporins. Will treat for 7 days  Diabetic management. Jhoana Louisf is from home alone. Able to safely return home? [facility] services? SW has been consulted for possible need for ECF placement.
Discharge Planning Update Note:   I got report from primary nurse. Nursing has been updating holland Garcia [employer]. Full code. Currently on Bi-pap. Will try Hi flow oxygen today. She is 79, has been here 8 days. Lives alone. Dietitian consulted for: Poor Intake/Appetite 5 or more days. Deconditioned. Baricitinib, ISS for chemstick. WBC 19.9. IV antibiotics stopped. PT/OT needed, when able to participate. IS and Acapella when able. May need ECF. SW consulted to follow for discharge planning. If goes home with oxygen, she prefers [facility].
Discharge Planning Update Note:   Transferred from 8B to 3A for intubation.  Handoff report given to Yoan Anaya, [id number]
Discharge Planning Update:    WBC 22.5. Sat is 92% on bipap, FiO2 100. D5.45 infusion, IV zithromax, IV Merrem, inhalers, baricitinib, IV decadron, Lovenox. PT/OT. Palliative care following, remains full code and wanting intubation, if needed.  Electronically signed by Khadijah Aj RN on 10/26/2021 at 11:05 AM
or in-home services and has transportation to home. Transportation/Food Security/Housekeeping Addressed:  No issues identified.

## 2021-11-12 NOTE — PROGRESS NOTES
[facility]   Nephrology progress note  11/12/2021, 8:15 AM      Pt Name:    Belle Zeng  MRN:     [id number]     Cindygfurt:    1940  Admit Date:    10/21/2021 11:02 AM  Primary Care Physician:  CESAR Wang CNP   Room number  3A-04/004-A    Chief Complaint: Nephrology following for LINDSEY    Subjective:  Patient seen and examined  Patient is on  100% FiO2  On vasopressin  Input and output reviewed      Objective:  24HR INTAKE/OUTPUT:      Intake/Output Summary (Last 24 hours) at 11/12/2021 0815  Last data filed at 11/12/2021 0600  Gross per 24 hour   Intake 2095.31 ml   Output 950 ml   Net 1145.31 ml     I/O last 3 completed shifts: In: 2095.3 [I.V.:688.7; NG/GT:1179; IV Piggyback:227.6]  Out: 950 [Urine:950]  No intake/output data recorded. Admission weight: 190 lb (86.2 kg)  Wt Readings from Last 3 Encounters:   10/27/21 170 lb 9.6 oz (77.4 kg)   07/19/21 187 lb (84.8 kg)   03/11/21 187 lb (84.8 kg)     Body mass index is 31.2 kg/m². Physical examination  VITALS:     Vitals:    11/12/21 0422 11/12/21 0500 11/12/21 0600 11/12/21 0700   BP:  (!) 106/42 (!) 100/45 (!) 108/50   Pulse: 77 73 71 73   Resp: 21 21 21 23   Temp:   99.5 °F (37.5 °C)    TempSrc:   Bladder    SpO2: 90% (!) 89% 90% 92%   Weight:       Height:         Limited examination secondary to COVID-19 positivity in isolation status  Currently in prone position  General Appearance: Ill-appearing, intubated  Mouth/Throat: ET tube in place  Extremities: Bilateral 2+ edema, stable      Lab Data  CBC:   Recent Labs     11/10/21  0451 11/10/21  1450 11/11/21  0229 11/11/21  0407 11/12/21  0430   WBC 18.4*  --   --  14.3* 8.3   HGB 7.7*   < > 10.0* 9.9* 9.1*   HCT 23.7*   < > 31.0* 31.6* 28.5*   PLT 64*  --   --  32* 18*    < > = values in this interval not displayed.      BMP:  Recent Labs     11/10/21  0451 11/11/21  0407 11/12/21  0430    144 144   K 4.0 3.3* 3.6    108 108   CO2 23 25 24   BUN 72* 69* 64*   CREATININE 1.6* 1.6* 1.4*   GLUCOSE 162* 146* 222*   CALCIUM 8.6 8.8 9.4   MG  --   --  1.7     Hepatic:   Recent Labs     11/10/21  0451 11/12/21 0430   LABALBU 2.6* 3.0*   AST 37  --    ALT 22  --    BILITOT 0.9  --    ALKPHOS 93  --          Meds:  Infusion:    sodium chloride Stopped (11/11/21 1900)    sodium chloride Stopped (11/11/21 1900)    sodium chloride Stopped (11/11/21 1900)    vasopressin (Septic Shock) infusion 0.03 Units/min (11/12/21 0815)    norepinephrine (LEVOPHED) infusion Stopped (11/12/21 0320)    midazolam 5 mg/hr (11/11/21 2330)    propofol 10 mcg/kg/min (11/12/21 0300)    sodium chloride 25 mL/hr at 11/11/21 2000    dextrose       Meds:    bumetanide  0.5 mg IntraVENous Q12H    albumin human  12.5 g IntraVENous Q12H    chlorhexidine  15 mL Mouth/Throat BID    fluconazole  200 mg IntraVENous Q24H    piperacillin-tazobactam  3,375 mg IntraVENous Q12H    docusate  100 mg Per NG tube BID    insulin lispro  0-18 Units SubCUTAneous V0M    folic acid  1 mg Oral Daily    pantoprazole  40 mg IntraVENous BID    senna  2 tablet Oral Nightly    [Held by provider] heparin (porcine)  5,000 Units SubCUTAneous 3 times per day    insulin glargine  25 Units SubCUTAneous Nightly    sodium chloride  500 mL IntraVENous Once    tiotropium-olodaterol  2 puff Inhalation Daily    [Held by provider] aspirin  81 mg Per NG tube Daily    lidocaine 1 % injection  5 mL IntraDERmal Once    sodium chloride flush  5-40 mL IntraVENous 2 times per day    [Held by provider] amLODIPine  5 mg Oral Daily    levothyroxine  75 mcg Oral Daily    metoprolol succinate  50 mg Oral Daily    atorvastatin  10 mg Oral Daily     Meds prn: potassium chloride **OR** potassium alternative oral replacement **OR** potassium chloride, potassium chloride, sodium chloride, sodium chloride, sodium chloride, sodium chloride flush, sodium chloride, LORazepam, albuterol sulfate HFA, glucose, dextrose, glucagon (rDNA), dextrose, ondansetron **OR** ondansetron, polyethylene glycol, [DISCONTINUED] acetaminophen **OR** acetaminophen       Impression and Plan:  1. LINDSEY secondary to ischemic ATN in setting of COVID-19 induced diffuse inflammatory syndrome and septic shock. LINDSEY further exacerbated by acute vasomotor constriction from acute hypercarbia  Remains nonoliguric but overall positive fluid balance  Over 900 mL of urine output in last 24 hours  We will continue with low-dose Bumex due to peripheral edema x3 more doses and reevaluate  We will continue with albumin x3 more doses and reevaluate    2. Acute hypoxic hypercarbic respiratory failure with severe respiratory acidemia. Improved. Vent management per pulmonary. 3.  Hypokalemia. Due to diuretics. Will replace with potassium 20 mEq x 2 doses  4. Hypernatremia. Resolved. Will monitor, reduce free water--see orders  5. Azotemia: Multifactorial including catabolic state, recent steroids and LINDSEY  6. Acute hypoxic respiratory failure secondary to COVID-19 pneumonia  7. ARDS  8. Septic shock with marked leukocytosis  9. IDDM  10.   Thrombocytopenia      John Cortez MD  [facility]

## 2021-11-13 NOTE — DISCHARGE SUMMARY
Patient was extubated and . PMH:  Per HPI  SHX: Former smoker. Denies alcohol or substance use. FHX: Liver cancer, lung cancer, diabetes, stroke, hypertension. Time of death: 80    Cause of death: Acute hypoxic respiratory failure secondary to COVID-19    Date of death: 2021    Discharge time 47 minutes           Electronically signed by Blair Canas.  CESAR Guardado - CNP  CRITICAL CARE SPECIALIST

## 2021-11-13 NOTE — PROGRESS NOTES
Pt was terminally extubated @ 1600 per orders. Family final decision was to withdraw care. Confirmed time of death @ 33 64 74.

## 2021-11-13 NOTE — DEATH NOTES
[address] [address]  Notice of Patient Passing      Patient Name- [facility] Number- [de-identified]   Attending Physician- Pam Buckley MD    Admitted on-10/21/2021 11:02 AM     On 2021 at 33 64 74 patient was found in 3A04 with:   Absence of vital signs. Absence of neurological response. Confirmed time of death at 33 64 74. Physician or On-call Physician notified of time of death- yes    Family present at time of death- yes, Grandson   Spiritual care present at time of death- no    Physician was notified and orders were obtained to release the body. Post-Mortem documentation completed; form printed, signed, and given to admitting.     Ariana Mcguire RN RN Nursing Supervisor/ Manager  21   4:46 PM    ________________________________________________________________________      Name of  Home: [address] [place], [place] [address]  Phone Number: [phone]    Who Will Sign Death Certificate:     [x] Dr. Pam Buckley

## 2021-11-13 NOTE — SIGNIFICANT EVENT
Family at bedside to withdraw care. Family is comfortable with decisions. Nursing provided update. Extubation order will be placed. Patient will remain on current sedation for palliative sedation and comfort while passing. Electronically signed by Gail Olivo.  Joyce Villagomez CNP on 11/13/2021 at 3:56 PM

## 2021-11-13 NOTE — PROGRESS NOTES
1 son up to see pt, updated and questions answered     12 grandson and wife up to see pt, questions answered    1550 spiritual care up and pray said    1600 pt extubated to room air IV drips remain on, grandson and wife at bedside after extubation    1615 pt asystole, no blood pressure or heart rate, grandson at bedside, called house supervisor and Dr. Liya Duque    [phone] pt transported to the [facility] per cart and transport personal

## 2021-11-13 NOTE — FLOWSHEET NOTE
[address] [address] PROGRESS NOTE      Patient: Chuy Quiroz  Room #: 3A-04/004-A            YOB: 1940  Age: 79 y.o. Gender: female            Admit Date & Time: 10/21/2021 11:02 AM    Assessment:   responded to message from nurse that pt was to be extubated today asking for support for pt's family. Pt's grandson and his wife eventually arrived. Interventions:   provided words of support and prayed with pt's family.  notified nurse that family was now ready for extubation to begin. Outcomes:  Pt's family expressed gratitude for encounter, but stated they wanted time alone with pt.  let family know that spiritual care remained available if support was needed. Plan:  1. Provide spiritual care and support if needed. 2.  Follow up with grief services. Electronically signed by Henry Hendricks on 11/13/2021 at [address] [place] [address]  [phone]       11/13/21 1530   Encounter Summary   Services provided to: Family   Referral/Consult From: Nurse Michael Cm; Family members   Continue Visiting Yes  (11/13)   Complexity of Encounter Moderate   Length of Encounter 30 minutes   Grief and Life Adjustment   Type End of life   Assessment Approachable; Tearful;  Anticipatory grief   Intervention Active listening; Explored feelings, thoughts, concerns; Prayer; Sustaining presence/ Ministry of presence   Outcome Connection/belonging; Comfort; Engaged in conversation; Expressed feelings/needs/concerns

## 2023-03-10 NOTE — PROGRESS NOTES
Called and updated holland Mcdonald on patient condition. This RN lets All Mcdonald know of new bipap settings and that the next step will be intubation. Sherrie Penaloza is agreeable to plan. stated